# Patient Record
Sex: FEMALE | Race: WHITE | NOT HISPANIC OR LATINO
[De-identification: names, ages, dates, MRNs, and addresses within clinical notes are randomized per-mention and may not be internally consistent; named-entity substitution may affect disease eponyms.]

---

## 2020-11-16 PROBLEM — Z00.00 ENCOUNTER FOR PREVENTIVE HEALTH EXAMINATION: Status: ACTIVE | Noted: 2020-11-16

## 2020-11-17 ENCOUNTER — APPOINTMENT (OUTPATIENT)
Dept: OBGYN | Facility: CLINIC | Age: 25
End: 2020-11-17
Payer: COMMERCIAL

## 2020-11-17 ENCOUNTER — NON-APPOINTMENT (OUTPATIENT)
Age: 25
End: 2020-11-17

## 2020-11-17 VITALS
WEIGHT: 182 LBS | BODY MASS INDEX: 26.96 KG/M2 | HEIGHT: 69 IN | SYSTOLIC BLOOD PRESSURE: 130 MMHG | DIASTOLIC BLOOD PRESSURE: 84 MMHG

## 2020-11-17 PROCEDURE — 76830 TRANSVAGINAL US NON-OB: CPT

## 2020-11-17 PROCEDURE — 99203 OFFICE O/P NEW LOW 30 MIN: CPT | Mod: 25

## 2020-11-17 NOTE — PROCEDURE
[Amenorrhea] : Amenorrhea [Transvaginal Ultrasound] : transvaginal ultrasound [Anteverted] : anteverted [No Fibroid(s)] : no fibroid(s) [L: ___ cm] : L: [unfilled] cm [W: ___cm] : W: [unfilled] cm [FreeTextEntry7] : 3.31 [FreeTextEntry8] : 1.98 [FreeTextEntry6] : + fhr crl 0.77  [FreeTextEntry4] : [facility] 7/8/21

## 2020-11-19 LAB
C TRACH RRNA SPEC QL NAA+PROBE: NOT DETECTED
N GONORRHOEA RRNA SPEC QL NAA+PROBE: NOT DETECTED
SOURCE AMPLIFICATION: NORMAL

## 2020-12-09 LAB
ABO + RH PNL BLD: NORMAL
BASOPHILS # BLD AUTO: 0.04 K/UL
BASOPHILS NFR BLD AUTO: 0.5 %
BLD GP AB SCN SERPL QL: NORMAL
EOSINOPHIL # BLD AUTO: 0.11 K/UL
EOSINOPHIL NFR BLD AUTO: 1.3 %
HBV SURFACE AG SERPL QL IA: NONREACTIVE
HCT VFR BLD CALC: 46 %
HGB BLD-MCNC: 14.9 G/DL
HIV1+2 AB SPEC QL IA.RAPID: NONREACTIVE
IMM GRANULOCYTES NFR BLD AUTO: 0.4 %
LYMPHOCYTES # BLD AUTO: 2.22 K/UL
LYMPHOCYTES NFR BLD AUTO: 26.7 %
MAN DIFF?: NORMAL
MCHC RBC-ENTMCNC: 27 PG
MCHC RBC-ENTMCNC: 32.4 G/DL
MCV RBC AUTO: 83.3 FL
MEV IGG FLD QL IA: >300 AU/ML
MEV IGG+IGM SER-IMP: POSITIVE
MONOCYTES # BLD AUTO: 0.74 K/UL
MONOCYTES NFR BLD AUTO: 8.9 %
NEUTROPHILS # BLD AUTO: 5.18 K/UL
NEUTROPHILS NFR BLD AUTO: 62.2 %
PLATELET # BLD AUTO: 220 K/UL
RBC # BLD: 5.52 M/UL
RBC # FLD: 13.3 %
RUBV IGG FLD-ACNC: 9.1 INDEX
RUBV IGG SER-IMP: POSITIVE
SARS-COV-2 IGG SERPL IA-ACNC: 0.08 INDEX
SARS-COV-2 IGG SERPL QL IA: NEGATIVE
T PALLIDUM AB SER QL IA: NEGATIVE
WBC # FLD AUTO: 8.32 K/UL

## 2020-12-15 ENCOUNTER — NON-APPOINTMENT (OUTPATIENT)
Age: 25
End: 2020-12-15

## 2020-12-15 ENCOUNTER — APPOINTMENT (OUTPATIENT)
Dept: OBGYN | Facility: CLINIC | Age: 25
End: 2020-12-15
Payer: COMMERCIAL

## 2020-12-15 VITALS — HEIGHT: 69 IN | WEIGHT: 183 LBS | BODY MASS INDEX: 27.11 KG/M2

## 2020-12-15 VITALS — DIASTOLIC BLOOD PRESSURE: 99 MMHG | SYSTOLIC BLOOD PRESSURE: 138 MMHG

## 2020-12-15 LAB
BILIRUB UR QL STRIP: NORMAL
CLARITY UR: CLEAR
COLLECTION METHOD: NORMAL
GLUCOSE UR-MCNC: NORMAL
HCG UR QL: 0.2 EU/DL
HGB UR QL STRIP.AUTO: NORMAL
KETONES UR-MCNC: NORMAL
LEUKOCYTE ESTERASE UR QL STRIP: NORMAL
NITRITE UR QL STRIP: NORMAL
PH UR STRIP: 6
PROT UR STRIP-MCNC: NORMAL
SP GR UR STRIP: 1.02

## 2020-12-15 PROCEDURE — 0501F PRENATAL FLOW SHEET: CPT

## 2020-12-28 ENCOUNTER — APPOINTMENT (OUTPATIENT)
Dept: MATERNAL FETAL MEDICINE | Facility: CLINIC | Age: 25
End: 2020-12-28
Payer: COMMERCIAL

## 2020-12-28 PROCEDURE — 76801 OB US < 14 WKS SINGLE FETUS: CPT

## 2020-12-28 PROCEDURE — 99215 OFFICE O/P EST HI 40 MIN: CPT | Mod: 25

## 2020-12-28 PROCEDURE — 76813 OB US NUCHAL MEAS 1 GEST: CPT

## 2020-12-28 PROCEDURE — 99072 ADDL SUPL MATRL&STAF TM PHE: CPT

## 2021-01-11 ENCOUNTER — NON-APPOINTMENT (OUTPATIENT)
Age: 26
End: 2021-01-11

## 2021-01-11 ENCOUNTER — APPOINTMENT (OUTPATIENT)
Dept: OBGYN | Facility: CLINIC | Age: 26
End: 2021-01-11
Payer: COMMERCIAL

## 2021-01-11 ENCOUNTER — TRANSCRIPTION ENCOUNTER (OUTPATIENT)
Age: 26
End: 2021-01-11

## 2021-01-11 VITALS — BODY MASS INDEX: 26.88 KG/M2 | SYSTOLIC BLOOD PRESSURE: 131 MMHG | DIASTOLIC BLOOD PRESSURE: 90 MMHG | WEIGHT: 182 LBS

## 2021-01-11 PROCEDURE — 0502F SUBSEQUENT PRENATAL CARE: CPT

## 2021-01-12 LAB
BILIRUB UR QL STRIP: NORMAL
GLUCOSE UR-MCNC: NORMAL
HCG UR QL: 1 EU/DL
HGB UR QL STRIP.AUTO: NORMAL
KETONES UR-MCNC: NORMAL
LEUKOCYTE ESTERASE UR QL STRIP: NORMAL
NITRITE UR QL STRIP: NORMAL
PH UR STRIP: 7
PROT UR STRIP-MCNC: NORMAL
SP GR UR STRIP: 1.02

## 2021-01-21 ENCOUNTER — NON-APPOINTMENT (OUTPATIENT)
Age: 26
End: 2021-01-21

## 2021-02-09 ENCOUNTER — NON-APPOINTMENT (OUTPATIENT)
Age: 26
End: 2021-02-09

## 2021-02-16 ENCOUNTER — APPOINTMENT (OUTPATIENT)
Dept: MATERNAL FETAL MEDICINE | Facility: CLINIC | Age: 26
End: 2021-02-16

## 2021-02-16 ENCOUNTER — NON-APPOINTMENT (OUTPATIENT)
Age: 26
End: 2021-02-16

## 2021-02-16 ENCOUNTER — APPOINTMENT (OUTPATIENT)
Dept: MATERNAL FETAL MEDICINE | Facility: CLINIC | Age: 26
End: 2021-02-16
Payer: COMMERCIAL

## 2021-02-16 ENCOUNTER — APPOINTMENT (OUTPATIENT)
Dept: OBGYN | Facility: CLINIC | Age: 26
End: 2021-02-16
Payer: COMMERCIAL

## 2021-02-16 VITALS — BODY MASS INDEX: 27.62 KG/M2 | DIASTOLIC BLOOD PRESSURE: 92 MMHG | WEIGHT: 187 LBS | SYSTOLIC BLOOD PRESSURE: 134 MMHG

## 2021-02-16 LAB
BILIRUB UR QL STRIP: NORMAL
GLUCOSE UR-MCNC: NORMAL
HCG UR QL: 1 EU/DL
HGB UR QL STRIP.AUTO: NORMAL
KETONES UR-MCNC: NORMAL
LEUKOCYTE ESTERASE UR QL STRIP: NORMAL
NITRITE UR QL STRIP: NORMAL
PH UR STRIP: 7.5
PROT UR STRIP-MCNC: NORMAL
SP GR UR STRIP: 1.02

## 2021-02-16 PROCEDURE — 99072 ADDL SUPL MATRL&STAF TM PHE: CPT

## 2021-02-16 PROCEDURE — 76817 TRANSVAGINAL US OBSTETRIC: CPT | Mod: 59

## 2021-02-16 PROCEDURE — 76811 OB US DETAILED SNGL FETUS: CPT

## 2021-02-16 PROCEDURE — 0502F SUBSEQUENT PRENATAL CARE: CPT

## 2021-03-15 ENCOUNTER — APPOINTMENT (OUTPATIENT)
Dept: MATERNAL FETAL MEDICINE | Facility: CLINIC | Age: 26
End: 2021-03-15
Payer: COMMERCIAL

## 2021-03-15 ENCOUNTER — APPOINTMENT (OUTPATIENT)
Dept: OBGYN | Facility: CLINIC | Age: 26
End: 2021-03-15
Payer: COMMERCIAL

## 2021-03-15 ENCOUNTER — NON-APPOINTMENT (OUTPATIENT)
Age: 26
End: 2021-03-15

## 2021-03-15 VITALS — BODY MASS INDEX: 28.35 KG/M2 | WEIGHT: 192 LBS | DIASTOLIC BLOOD PRESSURE: 82 MMHG | SYSTOLIC BLOOD PRESSURE: 129 MMHG

## 2021-03-15 DIAGNOSIS — Z3A.10 10 WEEKS GESTATION OF PREGNANCY: ICD-10-CM

## 2021-03-15 DIAGNOSIS — Z3A.19 19 WEEKS GESTATION OF PREGNANCY: ICD-10-CM

## 2021-03-15 PROCEDURE — 76816 OB US FOLLOW-UP PER FETUS: CPT

## 2021-03-15 PROCEDURE — 0502F SUBSEQUENT PRENATAL CARE: CPT

## 2021-03-15 PROCEDURE — 99072 ADDL SUPL MATRL&STAF TM PHE: CPT

## 2021-03-23 LAB
BASOPHILS # BLD AUTO: 0.05 K/UL
BASOPHILS NFR BLD AUTO: 0.5 %
EOSINOPHIL # BLD AUTO: 0.08 K/UL
EOSINOPHIL NFR BLD AUTO: 0.9 %
HCT VFR BLD CALC: 38.9 %
HGB BLD-MCNC: 12.5 G/DL
IMM GRANULOCYTES NFR BLD AUTO: 1.1 %
LYMPHOCYTES # BLD AUTO: 1.71 K/UL
LYMPHOCYTES NFR BLD AUTO: 18.6 %
MAN DIFF?: NORMAL
MCHC RBC-ENTMCNC: 27.6 PG
MCHC RBC-ENTMCNC: 32.1 G/DL
MCV RBC AUTO: 85.9 FL
MONOCYTES # BLD AUTO: 0.59 K/UL
MONOCYTES NFR BLD AUTO: 6.4 %
NEUTROPHILS # BLD AUTO: 6.67 K/UL
NEUTROPHILS NFR BLD AUTO: 72.5 %
PLATELET # BLD AUTO: 174 K/UL
RBC # BLD: 4.53 M/UL
RBC # FLD: 13.9 %
WBC # FLD AUTO: 9.2 K/UL

## 2021-03-24 LAB — GLUCOSE 1H P 50 G GLC PO SERPL-MCNC: 146 MG/DL

## 2021-03-30 ENCOUNTER — APPOINTMENT (OUTPATIENT)
Dept: MATERNAL FETAL MEDICINE | Facility: CLINIC | Age: 26
End: 2021-03-30
Payer: COMMERCIAL

## 2021-03-30 ENCOUNTER — NON-APPOINTMENT (OUTPATIENT)
Age: 26
End: 2021-03-30

## 2021-03-30 LAB
GLUCOSE 1H P 100 G GLC PO SERPL-MCNC: 192 MG/DL
GLUCOSE 2H P CHAL SERPL-MCNC: 167 MG/DL
GLUCOSE 3H P CHAL SERPL-MCNC: 95 MG/DL
GLUCOSE BS SERPL-MCNC: 80 MG/DL

## 2021-03-30 PROCEDURE — 99072 ADDL SUPL MATRL&STAF TM PHE: CPT

## 2021-03-30 PROCEDURE — 76816 OB US FOLLOW-UP PER FETUS: CPT

## 2021-04-05 ENCOUNTER — APPOINTMENT (OUTPATIENT)
Dept: MATERNAL FETAL MEDICINE | Facility: CLINIC | Age: 26
End: 2021-04-05
Payer: COMMERCIAL

## 2021-04-05 PROCEDURE — 99072 ADDL SUPL MATRL&STAF TM PHE: CPT

## 2021-04-05 PROCEDURE — 76815 OB US LIMITED FETUS(S): CPT

## 2021-04-05 PROCEDURE — 76819 FETAL BIOPHYS PROFIL W/O NST: CPT

## 2021-04-05 PROCEDURE — 99212 OFFICE O/P EST SF 10 MIN: CPT | Mod: 25

## 2021-04-07 ENCOUNTER — NON-APPOINTMENT (OUTPATIENT)
Age: 26
End: 2021-04-07

## 2021-04-13 ENCOUNTER — APPOINTMENT (OUTPATIENT)
Dept: OBGYN | Facility: CLINIC | Age: 26
End: 2021-04-13
Payer: COMMERCIAL

## 2021-04-13 ENCOUNTER — NON-APPOINTMENT (OUTPATIENT)
Age: 26
End: 2021-04-13

## 2021-04-13 VITALS — BODY MASS INDEX: 28.21 KG/M2 | DIASTOLIC BLOOD PRESSURE: 88 MMHG | WEIGHT: 191 LBS | SYSTOLIC BLOOD PRESSURE: 130 MMHG

## 2021-04-13 LAB
BILIRUB UR QL STRIP: NORMAL
GLUCOSE UR-MCNC: NORMAL
HCG UR QL: 0.2 EU/DL
HGB UR QL STRIP.AUTO: NORMAL
KETONES UR-MCNC: NORMAL
LEUKOCYTE ESTERASE UR QL STRIP: NORMAL
NITRITE UR QL STRIP: NORMAL
PH UR STRIP: 7
PROT UR STRIP-MCNC: NORMAL
SP GR UR STRIP: 1.02

## 2021-04-13 PROCEDURE — 0502F SUBSEQUENT PRENATAL CARE: CPT

## 2021-04-28 ENCOUNTER — NON-APPOINTMENT (OUTPATIENT)
Age: 26
End: 2021-04-28

## 2021-04-28 ENCOUNTER — APPOINTMENT (OUTPATIENT)
Dept: OBGYN | Facility: CLINIC | Age: 26
End: 2021-04-28
Payer: COMMERCIAL

## 2021-04-28 VITALS — WEIGHT: 189 LBS | DIASTOLIC BLOOD PRESSURE: 85 MMHG | BODY MASS INDEX: 27.91 KG/M2 | SYSTOLIC BLOOD PRESSURE: 136 MMHG

## 2021-04-28 DIAGNOSIS — Z86.32 PERSONAL HISTORY OF GESTATIONAL DIABETES: ICD-10-CM

## 2021-04-28 DIAGNOSIS — Z32.00 ENCOUNTER FOR PREGNANCY TEST, RESULT UNKNOWN: ICD-10-CM

## 2021-04-28 DIAGNOSIS — Z11.3 ENCOUNTER FOR SCREENING FOR INFECTIONS WITH A PREDOMINANTLY SEXUAL MODE OF TRANSMISSION: ICD-10-CM

## 2021-04-28 PROCEDURE — 0502F SUBSEQUENT PRENATAL CARE: CPT

## 2021-04-29 LAB
BILIRUB UR QL STRIP: NORMAL
GLUCOSE UR-MCNC: NORMAL
HCG UR QL: 0.2 EU/DL
HGB UR QL STRIP.AUTO: NORMAL
KETONES UR-MCNC: NORMAL
LEUKOCYTE ESTERASE UR QL STRIP: NORMAL
NITRITE UR QL STRIP: NORMAL
PH UR STRIP: 7
PROT UR STRIP-MCNC: NORMAL
SP GR UR STRIP: 1

## 2021-05-03 ENCOUNTER — APPOINTMENT (OUTPATIENT)
Dept: MATERNAL FETAL MEDICINE | Facility: CLINIC | Age: 26
End: 2021-05-03

## 2021-05-03 ENCOUNTER — APPOINTMENT (OUTPATIENT)
Dept: ANTEPARTUM | Facility: CLINIC | Age: 26
End: 2021-05-03

## 2021-05-10 ENCOUNTER — APPOINTMENT (OUTPATIENT)
Dept: OBGYN | Facility: CLINIC | Age: 26
End: 2021-05-10
Payer: COMMERCIAL

## 2021-05-10 ENCOUNTER — NON-APPOINTMENT (OUTPATIENT)
Age: 26
End: 2021-05-10

## 2021-05-10 VITALS — DIASTOLIC BLOOD PRESSURE: 82 MMHG | WEIGHT: 191 LBS | SYSTOLIC BLOOD PRESSURE: 120 MMHG | BODY MASS INDEX: 28.21 KG/M2

## 2021-05-10 PROCEDURE — 0502F SUBSEQUENT PRENATAL CARE: CPT

## 2021-05-19 LAB
BASOPHILS # BLD AUTO: 0.05 K/UL
BASOPHILS NFR BLD AUTO: 0.5 %
EOSINOPHIL # BLD AUTO: 0.09 K/UL
EOSINOPHIL NFR BLD AUTO: 0.9 %
HCT VFR BLD CALC: 38 %
HGB BLD-MCNC: 12.3 G/DL
IMM GRANULOCYTES NFR BLD AUTO: 0.9 %
LYMPHOCYTES # BLD AUTO: 2.35 K/UL
LYMPHOCYTES NFR BLD AUTO: 23.5 %
MAN DIFF?: NORMAL
MCHC RBC-ENTMCNC: 27.7 PG
MCHC RBC-ENTMCNC: 32.4 G/DL
MCV RBC AUTO: 85.6 FL
MONOCYTES # BLD AUTO: 1.08 K/UL
MONOCYTES NFR BLD AUTO: 10.8 %
NEUTROPHILS # BLD AUTO: 6.36 K/UL
NEUTROPHILS NFR BLD AUTO: 63.4 %
PLATELET # BLD AUTO: 157 K/UL
RBC # BLD: 4.44 M/UL
RBC # FLD: 13.5 %
WBC # FLD AUTO: 10.02 K/UL

## 2021-05-24 ENCOUNTER — APPOINTMENT (OUTPATIENT)
Dept: OBGYN | Facility: CLINIC | Age: 26
End: 2021-05-24
Payer: COMMERCIAL

## 2021-05-24 ENCOUNTER — NON-APPOINTMENT (OUTPATIENT)
Age: 26
End: 2021-05-24

## 2021-05-24 VITALS — DIASTOLIC BLOOD PRESSURE: 82 MMHG | SYSTOLIC BLOOD PRESSURE: 118 MMHG | WEIGHT: 190 LBS | BODY MASS INDEX: 28.06 KG/M2

## 2021-05-24 PROCEDURE — 0502F SUBSEQUENT PRENATAL CARE: CPT

## 2021-05-25 LAB
ALBUMIN SERPL ELPH-MCNC: 4 G/DL
ALP BLD-CCNC: 97 U/L
ALT SERPL-CCNC: 22 U/L
ANION GAP SERPL CALC-SCNC: 10 MMOL/L
AST SERPL-CCNC: 15 U/L
BILIRUB SERPL-MCNC: 0.4 MG/DL
BILIRUB UR QL STRIP: NORMAL
BUN SERPL-MCNC: 5 MG/DL
CALCIUM SERPL-MCNC: 9.2 MG/DL
CHLORIDE SERPL-SCNC: 105 MMOL/L
CO2 SERPL-SCNC: 25 MMOL/L
CREAT SERPL-MCNC: 0.5 MG/DL
CREAT SPEC-SCNC: 23 MG/DL
CREAT/PROT UR: <0.2 RATIO
GLUCOSE SERPL-MCNC: 70 MG/DL
GLUCOSE UR-MCNC: NORMAL
HCG UR QL: 0.2 EU/DL
HGB UR QL STRIP.AUTO: NORMAL
HIV1+2 AB SPEC QL IA.RAPID: NONREACTIVE
KETONES UR-MCNC: NORMAL
LEUKOCYTE ESTERASE UR QL STRIP: NORMAL
NITRITE UR QL STRIP: NORMAL
PH UR STRIP: 7
POTASSIUM SERPL-SCNC: 3.9 MMOL/L
PROT SERPL-MCNC: 6.5 G/DL
PROT UR STRIP-MCNC: NORMAL
PROT UR-MCNC: <5 MG/DLG/24H
SODIUM SERPL-SCNC: 140 MMOL/L
SP GR UR STRIP: 1.01
URATE SERPL-MCNC: 3.2 MG/DL

## 2021-05-26 ENCOUNTER — NON-APPOINTMENT (OUTPATIENT)
Age: 26
End: 2021-05-26

## 2021-06-07 ENCOUNTER — APPOINTMENT (OUTPATIENT)
Dept: OBGYN | Facility: CLINIC | Age: 26
End: 2021-06-07
Payer: COMMERCIAL

## 2021-06-07 ENCOUNTER — NON-APPOINTMENT (OUTPATIENT)
Age: 26
End: 2021-06-07

## 2021-06-07 VITALS — SYSTOLIC BLOOD PRESSURE: 116 MMHG | WEIGHT: 195 LBS | DIASTOLIC BLOOD PRESSURE: 82 MMHG | BODY MASS INDEX: 28.8 KG/M2

## 2021-06-07 PROCEDURE — 0502F SUBSEQUENT PRENATAL CARE: CPT

## 2021-06-14 ENCOUNTER — NON-APPOINTMENT (OUTPATIENT)
Age: 26
End: 2021-06-14

## 2021-06-14 ENCOUNTER — APPOINTMENT (OUTPATIENT)
Dept: OBGYN | Facility: CLINIC | Age: 26
End: 2021-06-14
Payer: COMMERCIAL

## 2021-06-14 VITALS — WEIGHT: 192 LBS | BODY MASS INDEX: 28.35 KG/M2 | SYSTOLIC BLOOD PRESSURE: 115 MMHG | DIASTOLIC BLOOD PRESSURE: 85 MMHG

## 2021-06-14 PROCEDURE — 0502F SUBSEQUENT PRENATAL CARE: CPT

## 2021-06-17 ENCOUNTER — APPOINTMENT (OUTPATIENT)
Dept: ANTEPARTUM | Facility: CLINIC | Age: 26
End: 2021-06-17
Payer: COMMERCIAL

## 2021-06-17 ENCOUNTER — ASOB RESULT (OUTPATIENT)
Age: 26
End: 2021-06-17

## 2021-06-17 VITALS
HEART RATE: 76 BPM | HEIGHT: 69 IN | SYSTOLIC BLOOD PRESSURE: 114 MMHG | BODY MASS INDEX: 28.88 KG/M2 | WEIGHT: 195 LBS | DIASTOLIC BLOOD PRESSURE: 76 MMHG | TEMPERATURE: 98 F

## 2021-06-17 PROCEDURE — 76816 OB US FOLLOW-UP PER FETUS: CPT | Mod: 26

## 2021-06-17 PROCEDURE — 76819 FETAL BIOPHYS PROFIL W/O NST: CPT | Mod: 26

## 2021-06-17 PROCEDURE — 99072 ADDL SUPL MATRL&STAF TM PHE: CPT

## 2021-06-21 ENCOUNTER — APPOINTMENT (OUTPATIENT)
Dept: OBGYN | Facility: CLINIC | Age: 26
End: 2021-06-21
Payer: COMMERCIAL

## 2021-06-21 ENCOUNTER — NON-APPOINTMENT (OUTPATIENT)
Age: 26
End: 2021-06-21

## 2021-06-21 ENCOUNTER — OUTPATIENT (OUTPATIENT)
Dept: OUTPATIENT SERVICES | Facility: HOSPITAL | Age: 26
LOS: 1 days | Discharge: HOME | End: 2021-06-21

## 2021-06-21 VITALS — SYSTOLIC BLOOD PRESSURE: 96 MMHG | DIASTOLIC BLOOD PRESSURE: 55 MMHG | HEART RATE: 83 BPM

## 2021-06-21 VITALS
DIASTOLIC BLOOD PRESSURE: 92 MMHG | TEMPERATURE: 98 F | SYSTOLIC BLOOD PRESSURE: 156 MMHG | HEART RATE: 82 BPM | RESPIRATION RATE: 20 BRPM

## 2021-06-21 VITALS — BODY MASS INDEX: 28.21 KG/M2 | DIASTOLIC BLOOD PRESSURE: 94 MMHG | WEIGHT: 191 LBS | SYSTOLIC BLOOD PRESSURE: 128 MMHG

## 2021-06-21 DIAGNOSIS — K40.20 BILATERAL INGUINAL HERNIA, WITHOUT OBSTRUCTION OR GANGRENE, NOT SPECIFIED AS RECURRENT: Chronic | ICD-10-CM

## 2021-06-21 LAB
ALBUMIN SERPL ELPH-MCNC: 4.1 G/DL — SIGNIFICANT CHANGE UP (ref 3.5–5.2)
ALP SERPL-CCNC: 165 U/L — HIGH (ref 30–115)
ALT FLD-CCNC: 21 U/L — SIGNIFICANT CHANGE UP (ref 0–41)
ANION GAP SERPL CALC-SCNC: 11 MMOL/L — SIGNIFICANT CHANGE UP (ref 7–14)
APPEARANCE UR: ABNORMAL
AST SERPL-CCNC: 15 U/L — SIGNIFICANT CHANGE UP (ref 0–41)
BACTERIA # UR AUTO: ABNORMAL
BILIRUB SERPL-MCNC: 0.5 MG/DL — SIGNIFICANT CHANGE UP (ref 0.2–1.2)
BILIRUB UR QL STRIP: NORMAL
BILIRUB UR-MCNC: NEGATIVE — SIGNIFICANT CHANGE UP
BUN SERPL-MCNC: 6 MG/DL — LOW (ref 10–20)
CALCIUM SERPL-MCNC: 10 MG/DL — SIGNIFICANT CHANGE UP (ref 8.5–10.1)
CHLORIDE SERPL-SCNC: 101 MMOL/L — SIGNIFICANT CHANGE UP (ref 98–110)
CO2 SERPL-SCNC: 24 MMOL/L — SIGNIFICANT CHANGE UP (ref 17–32)
COLOR SPEC: SIGNIFICANT CHANGE UP
CREAT ?TM UR-MCNC: 18 MG/DL — SIGNIFICANT CHANGE UP
CREAT SERPL-MCNC: 0.6 MG/DL — LOW (ref 0.7–1.5)
DIFF PNL FLD: NEGATIVE — SIGNIFICANT CHANGE UP
EPI CELLS # UR: 3 /HPF — SIGNIFICANT CHANGE UP (ref 0–5)
GLUCOSE BLDC GLUCOMTR-MCNC: 81 MG/DL — SIGNIFICANT CHANGE UP (ref 70–99)
GLUCOSE SERPL-MCNC: 78 MG/DL — SIGNIFICANT CHANGE UP (ref 70–99)
GLUCOSE UR QL: NEGATIVE — SIGNIFICANT CHANGE UP
GLUCOSE UR-MCNC: NORMAL
GP B STREP DNA SPEC QL NAA+PROBE: NORMAL
GP B STREP DNA SPEC QL NAA+PROBE: NOT DETECTED
HCG UR QL: 1 EU/DL
HCT VFR BLD CALC: 40 % — SIGNIFICANT CHANGE UP (ref 37–47)
HGB BLD-MCNC: 13.2 G/DL — SIGNIFICANT CHANGE UP (ref 12–16)
HGB UR QL STRIP.AUTO: NORMAL
HYALINE CASTS # UR AUTO: 1 /LPF — SIGNIFICANT CHANGE UP (ref 0–7)
KETONES UR-MCNC: NEGATIVE — SIGNIFICANT CHANGE UP
KETONES UR-MCNC: NORMAL
LDH SERPL L TO P-CCNC: 154 — SIGNIFICANT CHANGE UP (ref 50–242)
LEUKOCYTE ESTERASE UR QL STRIP: NORMAL
LEUKOCYTE ESTERASE UR-ACNC: NEGATIVE — SIGNIFICANT CHANGE UP
MCHC RBC-ENTMCNC: 27.2 PG — SIGNIFICANT CHANGE UP (ref 27–31)
MCHC RBC-ENTMCNC: 33 G/DL — SIGNIFICANT CHANGE UP (ref 32–37)
MCV RBC AUTO: 82.3 FL — SIGNIFICANT CHANGE UP (ref 81–99)
NITRITE UR QL STRIP: NORMAL
NITRITE UR-MCNC: NEGATIVE — SIGNIFICANT CHANGE UP
NRBC # BLD: 0 /100 WBCS — SIGNIFICANT CHANGE UP (ref 0–0)
PH UR STRIP: 7.5
PH UR: 7 — SIGNIFICANT CHANGE UP (ref 5–8)
PLATELET # BLD AUTO: 151 K/UL — SIGNIFICANT CHANGE UP (ref 130–400)
POTASSIUM SERPL-MCNC: 3.9 MMOL/L — SIGNIFICANT CHANGE UP (ref 3.5–5)
POTASSIUM SERPL-SCNC: 3.9 MMOL/L — SIGNIFICANT CHANGE UP (ref 3.5–5)
PROT ?TM UR-MCNC: <5 MG/DLG/24H — SIGNIFICANT CHANGE UP
PROT SERPL-MCNC: 7.2 G/DL — SIGNIFICANT CHANGE UP (ref 6–8)
PROT UR STRIP-MCNC: NORMAL
PROT UR-MCNC: NEGATIVE — SIGNIFICANT CHANGE UP
PROT/CREAT UR-RTO: <0.3 RATIO — HIGH (ref 0–0.2)
RBC # BLD: 4.86 M/UL — SIGNIFICANT CHANGE UP (ref 4.2–5.4)
RBC # FLD: 13.4 % — SIGNIFICANT CHANGE UP (ref 11.5–14.5)
RBC CASTS # UR COMP ASSIST: 2 /HPF — SIGNIFICANT CHANGE UP (ref 0–4)
SODIUM SERPL-SCNC: 136 MMOL/L — SIGNIFICANT CHANGE UP (ref 135–146)
SOURCE GBS: NORMAL
SP GR SPEC: 1 — LOW (ref 1.01–1.03)
SP GR UR STRIP: 1.02
URATE SERPL-MCNC: 4.1 MG/DL — SIGNIFICANT CHANGE UP (ref 2.5–7)
UROBILINOGEN FLD QL: SIGNIFICANT CHANGE UP
WBC # BLD: 9.54 K/UL — SIGNIFICANT CHANGE UP (ref 4.8–10.8)
WBC # FLD AUTO: 9.54 K/UL — SIGNIFICANT CHANGE UP (ref 4.8–10.8)
WBC UR QL: 13 /HPF — HIGH (ref 0–5)

## 2021-06-21 PROCEDURE — 0502F SUBSEQUENT PRENATAL CARE: CPT

## 2021-06-21 RX ORDER — SODIUM CHLORIDE 9 MG/ML
300 INJECTION, SOLUTION INTRAVENOUS
Refills: 0 | Status: DISCONTINUED | OUTPATIENT
Start: 2021-06-21 | End: 2021-07-06

## 2021-06-21 NOTE — OB PROVIDER TRIAGE NOTE - HISTORY OF PRESENT ILLNESS
27 y/o  at 37w4d, KOSTA 21, dated by LMP c/w first trimester sonogram, was sent over from PMD's office for blood pressure monitoring. Blood pressure in the office was 128/94 on repeat it was 130/108. Patient has chronic hypertension. Denies headaches, vision changes, SOB, chest pain, RUQ/epigastrin pain or abnormal swelling. Pregnancy has also been complicated with GMA1, fasting FS: 70-80's, postprandial <120's, and polyhydramnios, MVP in office today was 10.5cm. Denies other complications, LOF, vaginal bleeding, contractions or dysuria. Reports good fetal movement. GBS negative

## 2021-06-21 NOTE — OB PROVIDER TRIAGE NOTE - NSHPLABSRESULTS_GEN_ALL_CORE
Type and Screen: O neg  Antibody screen: neg   Rubella: immune ??  RPR: neg  HbSAg: neg  HIV: NR    Second Trimester:    GTT 80/192(H)/167(H)/95    rhogam: 4/21    Third Trimester:  HIV: NR  GBS: neg    10.2>12.3/38<157, 140/3.9/105/25/5/0.5<70, AST/ALT 15/22, Uprcr: <0.2, uric acid: 3.2    Sonograms:  6/17: 37w0d, 3301g (76%), MVP 7.05cm, ant placenta, vertex  12/24: 12w4d, NT 1.0mm  2/16: 19w5d, transverse, ant placenta, no previa, possible left foot clubbing otherwise normal fetal anatomical survey  3/15: 23w4d, 618g (47%), transverse, ant placenta, no previa, left foot   3/30: 25w5d, 896g (55%), cephalic, ant placenta, MVP 5cm  4/5: 26w4d, MVP 4.2cm, cephalic, ant placenta, bpp 8/8 Type and Screen: O neg  Antibody screen: neg   Rubella: immune ??  RPR: neg  HbSAg: neg  HIV: NR    Second Trimester:    GTT 80/192(H)/167(H)/95    rhogam: 4/21    Third Trimester:  HIV: NR  GBS: neg    10.2>12.3/38<157, 140/3.9/105/25/5/0.5<70, AST/ALT 15/22, Uprcr: <0.2, uric acid: 3.2    Sonograms:  6/17: 37w0d, 3301g (76%), MVP 7.05cm, ant placenta, vertex  12/24: 12w4d, NT 1.0mm  2/16: 19w5d, transverse, ant placenta, no previa, possible left foot clubbing otherwise normal fetal anatomical survey  3/15: 23w4d, 618g (47%), transverse, ant placenta, no previa, left foot   3/30: 25w5d, 896g (55%), cephalic, ant placenta, MVP 5cm  4/5: 26w4d, MVP 4.2cm, cephalic, ant placenta, bpp 8/8                          13.2   9.54  )-----------( 151      ( 21 Jun 2021 14:29 )             40.0   06-21    136  |  101  |  6<L>  ----------------------------<  78  3.9   |  24  |  0.6<L>    Ca    10.0      21 Jun 2021 14:29    TPro  7.2  /  Alb  4.1  /  TBili  0.5  /  DBili  x   /  AST  15  /  ALT  21  /  AlkPhos  165<H>  06-21    Uric Acid, Serum (06.21.21 @ 14:29)    Uric Acid, Serum: 4.1 mg/dL    Lactate Dehydrogenase, Serum (06.21.21 @ 14:29)    Lactate Dehydrogenase, Serum: 154    Urinalysis (06.21.21 @ 14:29)    pH Urine: 7.0    Glucose Qualitative, Urine: Negative    Blood, Urine: Negative    Color: Light Yellow    Urine Appearance: Slightly Turbid    Bilirubin: Negative    Ketone - Urine: Negative    Specific Gravity: 1.004    Protein, Urine: Negative    Urobilinogen: <2 mg/dL    Nitrite: Negative    Leukocyte Esterase Concentration: Negative

## 2021-06-21 NOTE — OB PROVIDER TRIAGE NOTE - NSHPPHYSICALEXAM_GEN_ALL_CORE
Vital Signs Last 24 Hrs  T(C): 36.7 (21 Jun 2021 13:49), Max: 36.7 (21 Jun 2021 13:49)  T(F): 98 (21 Jun 2021 13:49), Max: 98 (21 Jun 2021 13:49)  HR: 75 (21 Jun 2021 14:30) (75 - 82)  BP: 126/87 (21 Jun 2021 14:30) (126/87 - 156/92)  RR: 20 (21 Jun 2021 13:49) (20 - 20)    GA: AAOx3 in NAD  CV: normal s1s2  Pulm: CTAB   neuro: 2+ DTR bilaterally  abd: gravid, no palpable contractions, nontender, no RUQ/epigastric tenderness  EFM: 140/mod variability/accels +  toco: irregular  SVE; deferred  BSS: vertex, ant placenta, MVP 8.5cm, BPP 8/8

## 2021-06-21 NOTE — OB PROVIDER TRIAGE NOTE - NSOBPROVIDERNOTE_OBGYN_ALL_OB_FT
27 y/o  at 37w4d, GBS negative, with chronic hypertension, r/u preeclampsia, BPP 10/10, with reassuring maternal and fetal status  - for blood pressure monitoring, [id number]  - f/u PEL  - IV insert  - cont efm/toco  - reevaluate    Dr. Aquino and Dr Perez aware 25 y/o  at 37w4d, GBS negative, with chronic hypertension, r/u preeclampsia, BPP 10/10, with reassuring maternal and fetal status  - for blood pressure monitoring, [id number]  - f/u PEL  - IV insert  - cont efm/toco  - reevaluate    Dr. Aquino and Dr Perez aware    Addendum: Patient seen at bedside, most of her blood pressures have been in the normal range since being in L&D. Plan is for induction of labor on Wednesday at 1900 for multiple comorbidities. Patient is aware and agrees. Plan discussed with MFM, Dr. Ball and agrees with plan above 27 y/o  at 37w4d, GBS negative, GDMA1,  chronic hypertension, r/o preeclampsia, BPP 10/10, with reassuring maternal and fetal status  - for blood pressure monitoring, [id number]  - f/u PEL  - IV insert  - cont efm/toco  - reevaluate    Dr. Aquino and Dr Avila aware    Addendum: Patient seen at bedside, most of her blood pressures have been in the normal range since being in L&D. Plan is for induction of labor on Wednesday at 1900 for multiple comorbidities. Patient is aware and agrees. Plan discussed with M, Dr. Ball and agrees with plan above.    Dr. Aquino and Dr. Avila aware

## 2021-06-22 LAB — SARS-COV-2 RNA SPEC QL NAA+PROBE: SIGNIFICANT CHANGE UP

## 2021-06-23 ENCOUNTER — INPATIENT (INPATIENT)
Facility: HOSPITAL | Age: 26
LOS: 1 days | Discharge: HOME | End: 2021-06-25
Attending: OBSTETRICS & GYNECOLOGY | Admitting: OBSTETRICS & GYNECOLOGY
Payer: COMMERCIAL

## 2021-06-23 VITALS — TEMPERATURE: 98 F

## 2021-06-23 DIAGNOSIS — K40.20 BILATERAL INGUINAL HERNIA, WITHOUT OBSTRUCTION OR GANGRENE, NOT SPECIFIED AS RECURRENT: Chronic | ICD-10-CM

## 2021-06-23 LAB
ALBUMIN SERPL ELPH-MCNC: 4.1 G/DL — SIGNIFICANT CHANGE UP (ref 3.5–5.2)
ALP SERPL-CCNC: 157 U/L — HIGH (ref 30–115)
ALT FLD-CCNC: 17 U/L — SIGNIFICANT CHANGE UP (ref 0–41)
AMPHET UR-MCNC: NEGATIVE — SIGNIFICANT CHANGE UP
ANION GAP SERPL CALC-SCNC: 11 MMOL/L — SIGNIFICANT CHANGE UP (ref 7–14)
APPEARANCE UR: CLEAR — SIGNIFICANT CHANGE UP
AST SERPL-CCNC: 14 U/L — SIGNIFICANT CHANGE UP (ref 0–41)
BACTERIA # UR AUTO: ABNORMAL
BARBITURATES UR SCN-MCNC: NEGATIVE — SIGNIFICANT CHANGE UP
BASOPHILS # BLD AUTO: 0.05 K/UL — SIGNIFICANT CHANGE UP (ref 0–0.2)
BASOPHILS NFR BLD AUTO: 0.5 % — SIGNIFICANT CHANGE UP (ref 0–1)
BENZODIAZ UR-MCNC: NEGATIVE — SIGNIFICANT CHANGE UP
BILIRUB SERPL-MCNC: 0.3 MG/DL — SIGNIFICANT CHANGE UP (ref 0.2–1.2)
BILIRUB UR-MCNC: NEGATIVE — SIGNIFICANT CHANGE UP
BLD GP AB SCN SERPL QL: SIGNIFICANT CHANGE UP
BUN SERPL-MCNC: 8 MG/DL — LOW (ref 10–20)
BUPRENORPHINE SCREEN, URINE RESULT: NEGATIVE — SIGNIFICANT CHANGE UP
CALCIUM SERPL-MCNC: 9.7 MG/DL — SIGNIFICANT CHANGE UP (ref 8.5–10.1)
CHLORIDE SERPL-SCNC: 105 MMOL/L — SIGNIFICANT CHANGE UP (ref 98–110)
CO2 SERPL-SCNC: 21 MMOL/L — SIGNIFICANT CHANGE UP (ref 17–32)
COCAINE METAB.OTHER UR-MCNC: NEGATIVE — SIGNIFICANT CHANGE UP
COLOR SPEC: SIGNIFICANT CHANGE UP
CREAT ?TM UR-MCNC: 25 MG/DL — SIGNIFICANT CHANGE UP
CREAT SERPL-MCNC: 0.6 MG/DL — LOW (ref 0.7–1.5)
DIFF PNL FLD: NEGATIVE — SIGNIFICANT CHANGE UP
EOSINOPHIL # BLD AUTO: 0.05 K/UL — SIGNIFICANT CHANGE UP (ref 0–0.7)
EOSINOPHIL NFR BLD AUTO: 0.5 % — SIGNIFICANT CHANGE UP (ref 0–8)
EPI CELLS # UR: 3 /HPF — SIGNIFICANT CHANGE UP (ref 0–5)
FENTANYL UR QL: NEGATIVE — SIGNIFICANT CHANGE UP
GLUCOSE BLDC GLUCOMTR-MCNC: 77 MG/DL — SIGNIFICANT CHANGE UP (ref 70–99)
GLUCOSE SERPL-MCNC: 78 MG/DL — SIGNIFICANT CHANGE UP (ref 70–99)
GLUCOSE UR QL: NEGATIVE — SIGNIFICANT CHANGE UP
HCT VFR BLD CALC: 38.4 % — SIGNIFICANT CHANGE UP (ref 37–47)
HGB BLD-MCNC: 12.8 G/DL — SIGNIFICANT CHANGE UP (ref 12–16)
HYALINE CASTS # UR AUTO: 3 /LPF — SIGNIFICANT CHANGE UP (ref 0–7)
IMM GRANULOCYTES NFR BLD AUTO: 0.7 % — HIGH (ref 0.1–0.3)
KETONES UR-MCNC: NEGATIVE — SIGNIFICANT CHANGE UP
L&D DRUG SCREEN, URINE: SIGNIFICANT CHANGE UP
LDH SERPL L TO P-CCNC: 168 — SIGNIFICANT CHANGE UP (ref 50–242)
LEUKOCYTE ESTERASE UR-ACNC: ABNORMAL
LYMPHOCYTES # BLD AUTO: 2.15 K/UL — SIGNIFICANT CHANGE UP (ref 1.2–3.4)
LYMPHOCYTES # BLD AUTO: 22.5 % — SIGNIFICANT CHANGE UP (ref 20.5–51.1)
MCHC RBC-ENTMCNC: 27.5 PG — SIGNIFICANT CHANGE UP (ref 27–31)
MCHC RBC-ENTMCNC: 33.3 G/DL — SIGNIFICANT CHANGE UP (ref 32–37)
MCV RBC AUTO: 82.6 FL — SIGNIFICANT CHANGE UP (ref 81–99)
METHADONE UR-MCNC: NEGATIVE — SIGNIFICANT CHANGE UP
MONOCYTES # BLD AUTO: 0.99 K/UL — HIGH (ref 0.1–0.6)
MONOCYTES NFR BLD AUTO: 10.3 % — HIGH (ref 1.7–9.3)
NEUTROPHILS # BLD AUTO: 6.26 K/UL — SIGNIFICANT CHANGE UP (ref 1.4–6.5)
NEUTROPHILS NFR BLD AUTO: 65.5 % — SIGNIFICANT CHANGE UP (ref 42.2–75.2)
NITRITE UR-MCNC: NEGATIVE — SIGNIFICANT CHANGE UP
NRBC # BLD: 0 /100 WBCS — SIGNIFICANT CHANGE UP (ref 0–0)
OPIATES UR-MCNC: NEGATIVE — SIGNIFICANT CHANGE UP
OXYCODONE UR-MCNC: NEGATIVE — SIGNIFICANT CHANGE UP
PCP UR-MCNC: NEGATIVE — SIGNIFICANT CHANGE UP
PH UR: 6.5 — SIGNIFICANT CHANGE UP (ref 5–8)
PLATELET # BLD AUTO: 156 K/UL — SIGNIFICANT CHANGE UP (ref 130–400)
POTASSIUM SERPL-MCNC: 3.9 MMOL/L — SIGNIFICANT CHANGE UP (ref 3.5–5)
POTASSIUM SERPL-SCNC: 3.9 MMOL/L — SIGNIFICANT CHANGE UP (ref 3.5–5)
PRENATAL SYPHILIS TEST: SIGNIFICANT CHANGE UP
PROPOXYPHENE QUALITATIVE URINE RESULT: NEGATIVE — SIGNIFICANT CHANGE UP
PROT ?TM UR-MCNC: 5 MG/DLG/24H — SIGNIFICANT CHANGE UP
PROT SERPL-MCNC: 6.8 G/DL — SIGNIFICANT CHANGE UP (ref 6–8)
PROT UR-MCNC: NEGATIVE — SIGNIFICANT CHANGE UP
PROT/CREAT UR-RTO: 0.2 RATIO — SIGNIFICANT CHANGE UP (ref 0–0.2)
RBC # BLD: 4.65 M/UL — SIGNIFICANT CHANGE UP (ref 4.2–5.4)
RBC # FLD: 13.6 % — SIGNIFICANT CHANGE UP (ref 11.5–14.5)
RBC CASTS # UR COMP ASSIST: 1 /HPF — SIGNIFICANT CHANGE UP (ref 0–4)
SODIUM SERPL-SCNC: 137 MMOL/L — SIGNIFICANT CHANGE UP (ref 135–146)
SP GR SPEC: 1.01 — LOW (ref 1.01–1.03)
URATE SERPL-MCNC: 3.7 MG/DL — SIGNIFICANT CHANGE UP (ref 2.5–7)
UROBILINOGEN FLD QL: SIGNIFICANT CHANGE UP
WBC # BLD: 9.57 K/UL — SIGNIFICANT CHANGE UP (ref 4.8–10.8)
WBC # FLD AUTO: 9.57 K/UL — SIGNIFICANT CHANGE UP (ref 4.8–10.8)
WBC UR QL: 6 /HPF — HIGH (ref 0–5)

## 2021-06-23 RX ORDER — OXYTOCIN 10 UNIT/ML
333.33 VIAL (ML) INJECTION
Qty: 20 | Refills: 0 | Status: DISCONTINUED | OUTPATIENT
Start: 2021-06-23 | End: 2021-06-24

## 2021-06-23 RX ORDER — SODIUM CHLORIDE 9 MG/ML
1000 INJECTION, SOLUTION INTRAVENOUS
Refills: 0 | Status: DISCONTINUED | OUTPATIENT
Start: 2021-06-23 | End: 2021-06-24

## 2021-06-23 RX ORDER — OXYTOCIN 10 UNIT/ML
2 VIAL (ML) INJECTION
Qty: 30 | Refills: 0 | Status: DISCONTINUED | OUTPATIENT
Start: 2021-06-23 | End: 2021-06-24

## 2021-06-23 RX ADMIN — Medication 2 MILLIUNIT(S)/MIN: at 23:51

## 2021-06-23 NOTE — OB PROVIDER H&P - NSHPSOCIALHISTORY_GEN_ALL_CORE
Partially impaired: cannot see medication labels or newsprint, but can see obstacles in path, and the surrounding layout; can count fingers at arm's length/Wears RX Eyeglasses for Distance and Reading Denies tobacco use, alcohol consumption, or recreational/illicit drug use.

## 2021-06-23 NOTE — OB PROVIDER H&P - NSHPPHYSICALEXAM_GEN_ALL_CORE
Vital Signs Last 24 Hrs  T(C): 36.7 (23 Jun 2021 18:59), Max: 36.7 (23 Jun 2021 18:59)  T(F): 98 (23 Jun 2021 18:59), Max: 98 (23 Jun 2021 18:59)  HR: 83 (23 Jun 2021 19:01) (83 - 83)  BP: 130/88 (23 Jun 2021 19:01) (130/88 - 130/88)    Gen: AOx3, NAD  CV:  normal s1, s2, regular rate, rhythm  Pulm: CTAB  Abd: soft, gravid, nontender, no palpable contractions  Ext: no swelling  Neuro: 2+ DTR bilaterally (patellar)  Bedside sono: cephalic     EFM: 135/mod bridger/+accels   [place]: irregular  SVE: 1-2/50/-2/vtx/intact

## 2021-06-23 NOTE — OB PROVIDER H&P - NSHPLABSRESULTS_GEN_ALL_CORE
GTT 80/192/167/95    Sonograms:  11/17/20: + FH, EDC 7/8/21, SIUP  12/28/21: EGA 12w4d, CL 33mm, SIUP, NT 1.0mm  2/16/21: EGA 19w5d, transverse, 3vc, ant placenta, CL 36mm, EFW 336g, left club foot   3/15/21: EGA 23w4d, transverse, 3vc, anterior placenta, EFW 618g (47%), left club foot,   3/30/21: EGA 25w5d, cephalic, 3vc, ant placenta, MVP 5.0cm, EFW 896g (55%), left club foot, BPP 8/8   4/5/21: EGA 26w4d, cephalic, 3vc, ant placenta, MVP 4.2cm, BPP 8/8  6/17/21: EGA 37w0d, vertex, anterior placenta, MVP 7.05cm, BPP 8/8, EFW 3301g (76%)

## 2021-06-23 NOTE — OB RN PATIENT PROFILE - AS SC BRADEN ACTIVITY
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 Melton and Dr. Haynes (4) walks frequently

## 2021-06-23 NOTE — OB PROVIDER H&P - NS_OBGYNHISTORY_OBGYN_ALL_OB_FT
OB Hx:      Gyn Hx:  H/o PCOS, previously treated with OCPs.  Denies h/o fibroids, STIs, or abnormal pap smears.

## 2021-06-23 NOTE — OB PROVIDER H&P - HISTORY OF PRESENT ILLNESS
Rhogam 4/21/21  Fetal echo wnl  Possible club foot of fetus  25yo  @37w6d, dated by LMP, KOSTA 21, here for scheduled induction of labor for h/o chronic HTN (not on meds) and GDMA1. Denies contractions, leakage of fluid, vaginal bleeding. Reports good fetal movement. FS have been well controlled, FS <90, 2hr PP <120. Not on medications for HTN. S/p Rhogam 21. Fetal echo wnl. Fetal left club foot. Denies headache, vision change, SOB, nausea, vomiting, RUQ/epigastric pain, or new onset/worsening swelling. Preeclamptic baseline labs wnl. Denies other complications during pregnancy. GBS negative.

## 2021-06-23 NOTE — OB PROVIDER H&P - ASSESSMENT
27yo  @37w6d, h/o GDMA1, cHTN, for IOL   - admit to L&D  - clear liquid diet  - IVF hydration  - continuous ECM/toco  - monitor vitals  - pain management prn  - f/u admission labs   - f/u PELs, Uprcr  - FS q4 in latent labor, q2 in active labor      and and senior resident aware

## 2021-06-24 PROBLEM — E28.2 POLYCYSTIC OVARIAN SYNDROME: Chronic | Status: ACTIVE | Noted: 2021-06-21

## 2021-06-24 LAB
COVID-19 SPIKE DOMAIN AB INTERP: NEGATIVE — SIGNIFICANT CHANGE UP
COVID-19 SPIKE DOMAIN ANTIBODY RESULT: 0.4 U/ML — SIGNIFICANT CHANGE UP
GLUCOSE BLDC GLUCOMTR-MCNC: 83 MG/DL — SIGNIFICANT CHANGE UP (ref 70–99)
GLUCOSE BLDC GLUCOMTR-MCNC: 87 MG/DL — SIGNIFICANT CHANGE UP (ref 70–99)
SARS-COV-2 IGG+IGM SERPL QL IA: 0.4 U/ML — SIGNIFICANT CHANGE UP
SARS-COV-2 IGG+IGM SERPL QL IA: NEGATIVE — SIGNIFICANT CHANGE UP

## 2021-06-24 PROCEDURE — 59400 OBSTETRICAL CARE: CPT

## 2021-06-24 RX ORDER — HYDROCORTISONE 1 %
1 OINTMENT (GRAM) TOPICAL EVERY 6 HOURS
Refills: 0 | Status: DISCONTINUED | OUTPATIENT
Start: 2021-06-24 | End: 2021-06-25

## 2021-06-24 RX ORDER — DIBUCAINE 1 %
1 OINTMENT (GRAM) RECTAL EVERY 6 HOURS
Refills: 0 | Status: DISCONTINUED | OUTPATIENT
Start: 2021-06-24 | End: 2021-06-25

## 2021-06-24 RX ORDER — PRAMOXINE HYDROCHLORIDE 150 MG/15G
1 AEROSOL, FOAM RECTAL EVERY 4 HOURS
Refills: 0 | Status: DISCONTINUED | OUTPATIENT
Start: 2021-06-24 | End: 2021-06-25

## 2021-06-24 RX ORDER — DIPHENHYDRAMINE HCL 50 MG
25 CAPSULE ORAL ONCE
Refills: 0 | Status: COMPLETED | OUTPATIENT
Start: 2021-06-24 | End: 2021-06-24

## 2021-06-24 RX ORDER — DIPHENHYDRAMINE HCL 50 MG
25 CAPSULE ORAL EVERY 6 HOURS
Refills: 0 | Status: DISCONTINUED | OUTPATIENT
Start: 2021-06-24 | End: 2021-06-25

## 2021-06-24 RX ORDER — IBUPROFEN 200 MG
600 TABLET ORAL EVERY 6 HOURS
Refills: 0 | Status: COMPLETED | OUTPATIENT
Start: 2021-06-24 | End: 2022-05-23

## 2021-06-24 RX ORDER — OXYTOCIN 10 UNIT/ML
333.33 VIAL (ML) INJECTION
Qty: 20 | Refills: 0 | Status: DISCONTINUED | OUTPATIENT
Start: 2021-06-24 | End: 2021-06-25

## 2021-06-24 RX ORDER — ACETAMINOPHEN 500 MG
975 TABLET ORAL
Refills: 0 | Status: DISCONTINUED | OUTPATIENT
Start: 2021-06-24 | End: 2021-06-25

## 2021-06-24 RX ORDER — OXYCODONE HYDROCHLORIDE 5 MG/1
5 TABLET ORAL ONCE
Refills: 0 | Status: DISCONTINUED | OUTPATIENT
Start: 2021-06-24 | End: 2021-06-25

## 2021-06-24 RX ORDER — TETANUS TOXOID, REDUCED DIPHTHERIA TOXOID AND ACELLULAR PERTUSSIS VACCINE, ADSORBED 5; 2.5; 8; 8; 2.5 [IU]/.5ML; [IU]/.5ML; UG/.5ML; UG/.5ML; UG/.5ML
0.5 SUSPENSION INTRAMUSCULAR ONCE
Refills: 0 | Status: DISCONTINUED | OUTPATIENT
Start: 2021-06-24 | End: 2021-06-25

## 2021-06-24 RX ORDER — MAGNESIUM HYDROXIDE 400 MG/1
30 TABLET, CHEWABLE ORAL
Refills: 0 | Status: DISCONTINUED | OUTPATIENT
Start: 2021-06-24 | End: 2021-06-25

## 2021-06-24 RX ORDER — SODIUM CHLORIDE 9 MG/ML
3 INJECTION INTRAMUSCULAR; INTRAVENOUS; SUBCUTANEOUS EVERY 8 HOURS
Refills: 0 | Status: DISCONTINUED | OUTPATIENT
Start: 2021-06-24 | End: 2021-06-25

## 2021-06-24 RX ORDER — KETOROLAC TROMETHAMINE 30 MG/ML
30 SYRINGE (ML) INJECTION ONCE
Refills: 0 | Status: DISCONTINUED | OUTPATIENT
Start: 2021-06-24 | End: 2021-06-24

## 2021-06-24 RX ORDER — LANOLIN
1 OINTMENT (GRAM) TOPICAL EVERY 6 HOURS
Refills: 0 | Status: DISCONTINUED | OUTPATIENT
Start: 2021-06-24 | End: 2021-06-25

## 2021-06-24 RX ORDER — IBUPROFEN 200 MG
600 TABLET ORAL EVERY 6 HOURS
Refills: 0 | Status: DISCONTINUED | OUTPATIENT
Start: 2021-06-24 | End: 2021-06-25

## 2021-06-24 RX ORDER — BENZOCAINE 10 %
1 GEL (GRAM) MUCOUS MEMBRANE EVERY 6 HOURS
Refills: 0 | Status: DISCONTINUED | OUTPATIENT
Start: 2021-06-24 | End: 2021-06-25

## 2021-06-24 RX ORDER — AER TRAVELER 0.5 G/1
1 SOLUTION RECTAL; TOPICAL EVERY 4 HOURS
Refills: 0 | Status: DISCONTINUED | OUTPATIENT
Start: 2021-06-24 | End: 2021-06-25

## 2021-06-24 RX ORDER — OXYCODONE HYDROCHLORIDE 5 MG/1
5 TABLET ORAL
Refills: 0 | Status: DISCONTINUED | OUTPATIENT
Start: 2021-06-24 | End: 2021-06-25

## 2021-06-24 RX ORDER — SIMETHICONE 80 MG/1
80 TABLET, CHEWABLE ORAL EVERY 4 HOURS
Refills: 0 | Status: DISCONTINUED | OUTPATIENT
Start: 2021-06-24 | End: 2021-06-25

## 2021-06-24 RX ORDER — CITRIC ACID/SODIUM CITRATE 300-500 MG
30 SOLUTION, ORAL ORAL ONCE
Refills: 0 | Status: COMPLETED | OUTPATIENT
Start: 2021-06-24 | End: 2021-06-24

## 2021-06-24 RX ADMIN — Medication 975 MILLIGRAM(S): at 16:30

## 2021-06-24 RX ADMIN — Medication 975 MILLIGRAM(S): at 22:00

## 2021-06-24 RX ADMIN — SODIUM CHLORIDE 3 MILLILITER(S): 9 INJECTION INTRAMUSCULAR; INTRAVENOUS; SUBCUTANEOUS at 23:48

## 2021-06-24 RX ADMIN — Medication 975 MILLIGRAM(S): at 16:36

## 2021-06-24 RX ADMIN — SODIUM CHLORIDE 3 MILLILITER(S): 9 INJECTION INTRAMUSCULAR; INTRAVENOUS; SUBCUTANEOUS at 15:57

## 2021-06-24 RX ADMIN — Medication 15 MILLILITER(S): at 01:39

## 2021-06-24 RX ADMIN — Medication 30 MILLIGRAM(S): at 13:59

## 2021-06-24 RX ADMIN — Medication 1 TABLET(S): at 16:32

## 2021-06-24 RX ADMIN — Medication 975 MILLIGRAM(S): at 21:32

## 2021-06-24 RX ADMIN — Medication 1000 MILLIUNIT(S)/MIN: at 13:27

## 2021-06-24 NOTE — PROGRESS NOTE ADULT - SUBJECTIVE AND OBJECTIVE BOX
PGY1 Note    Patient seen at bedside. No complaints at the moment.    T(F): 97.8 ( @ 20:05), Max: 98 ( @ 18:59)  HR: 70 ( @ 02:56)  BP: 114/72 ( @ 02:56) (108/71 - 132/87)  RR: 18 ( @ 20:05)  EFM: 130BPM/mod bridger/accels pos  TOCO: q2-3m  SVE: 1.5/50/-2 vtx by sono, intact    Medications:  citric acid/sodium citrate Solution: 15 ( @ 01:39)  oxytocin Infusion.: 2 ( @ 23:35)      Labs:                        12.8   9.57  )-----------( 156      ( 2021 20:05 )             38.4         137  |  105  |  8<L>  ----------------------------<  78  3.9   |  21  |  0.6<L>    Ca    9.7      2021 20:05    TPro  6.8  /  Alb  4.1  /  TBili  0.3  /  DBili  x   /  AST  14  /  ALT  17  /  AlkPhos  157<H>      Prenatal Syphilis Test: Nonreact ( @ 20:05)  Uric Acid, Serum: 3.7 mg/dL ( @ 20:05)  Antibody Screen: NEG (21 @ 20:05)    Urinalysis Basic - ( 2021 20:00 )    Color: Light Yellow / Appearance: Clear / S.007 / pH: x  Gluc: x / Ketone: Negative  / Bili: Negative / Urobili: <2 mg/dL   Blood: x / Protein: Negative / Nitrite: Negative   Leuk Esterase: Small / RBC: 1 /HPF / WBC 6 /HPF   Sq Epi: x / Non Sq Epi: 3 /HPF / Bacteria: Few

## 2021-06-24 NOTE — PROGRESS NOTE ADULT - ASSESSMENT
A/P:   26y  at 69l0pGW, GBS neg, IOL for CHTN, GDMA1, s/p epidural, on pitocin, in labor progressing well.  -Continue current management   -Pain management prn  -Continuous EFM/toco  -F/u pending labs  -Reevaluate     Dr. Woodward aware, Dr. Avila to be made aware  
26y  at 38w0d, GBS neg, IOL for cHTN, GDMA1, in labor.    - Cont EFM/[place]  - IV Hydration  - Pain Management prn  - Monitor vitals  - Clear Liquids    Dr. Gallagher and ASHLEY Thakkar aware

## 2021-06-24 NOTE — PROGRESS NOTE ADULT - SUBJECTIVE AND OBJECTIVE BOX
PGY2 Note    S: Patient seen and examined at bedside. Doing well, complaining of painful contractions.    Vital Signs Last 24 Hrs  T(C): 36.8 (2021 00:03), Max: 36.8 (2021 00:03)  T(F): 98.24 (2021 00:03), Max: 98.24 (2021 00:03)  HR: 78 (2021 09:55) (57 - 95)  BP: 124/84 (2021 09:55) (103/56 - 146/83)  RR: 18 (2021 02:23) (18 - 18)  SpO2: 98% (2021 09:54) (95% - 100%)    EFM: 150/mod/+accels  TOCO: q4m  SVE: 9.5/100/0 per Dr. Gallagher @0922    Labs:                        12.8   9.57  )-----------( 156      ( 2021 20:05 )             38.4           137  |  105  |  8<L>  ----------------------------<  78  3.9   |  21  |  0.6<L>    Ca    9.7      2021 20:05    TPro  6.8  /  Alb  4.1  /  TBili  0.3  /  DBili  x   /  AST  14  /  ALT  17  /  AlkPhos  157<H>  -23    ABO RH Interpretation: O NEG (21 @ 20:05)    Urinalysis Basic - ( 2021 20:00 )    Color: Light Yellow / Appearance: Clear / S.007 / pH: x  Gluc: x / Ketone: Negative  / Bili: Negative / Urobili: <2 mg/dL   Blood: x / Protein: Negative / Nitrite: Negative   Leuk Esterase: Small / RBC: 1 /HPF / WBC 6 /HPF   Sq Epi: x / Non Sq Epi: 3 /HPF / Bacteria: Few    Prenatal Syphilis Test: Nonreact (21 @ 20:05)    UDS: neg  Covid: neg    Meds:  Epi: @0230  Pitocin: startd @1577, d/c @9189

## 2021-06-24 NOTE — OB PROVIDER DELIVERY SUMMARY - NSPROVIDERDELIVERYNOTE_OBGYN_ALL_OB_FT
Pt fully dilated and pushing. Head delivered OA and restituted to NISHANT. Anterior shoulder delivered followed by posterior shoulder and rest of the body without difficulty. Baby suctioned, cord clamped and cut x 2 and handed to mother. Cord segment and gases obtained, placenta delivered intact with membranes without difficulty. Vaginal vault, perineum and cervix inspected and 2nd degree laceration noted and repaired in usual fashion. Live male infant delivered, apgars 9/9

## 2021-06-24 NOTE — PROCEDURE NOTE - ADDITIONAL PROCEDURE DETAILS
03.15am Patient stable and comfortable.  VSS 03.15am Patient stable and comfortable.  VSS    REDOSE @ 0730  Pain: 8/10  V/E 5cm  Bupi 0.25% 8cc + fentanyl 50mcg  Given in increments after  -ve aspiration  VSS analgesia at T10    REDOSE @ 0815  Pain: 10/10 R side L1-2  V/E not done  Medication: Lidocaine 2% 5cc + fentanyl 50mcg  Given in increments after  -ve aspiration  VSS analgesia at T10  V/E after redose 9cm   wnl 03.15am Patient stable and comfortable.  VSS    REDOSE @ 0730  Pain: 8/10  V/E 5cm  Bupi 0.25% 8cc + fentanyl 50mcg  Given in increments after  -ve aspiration  VSS analgesia at T10    REDOSE @ 0815  Pain: 10/10 R side L1-2  V/E not done  Medication: Lidocaine 2% 5cc + fentanyl 50mcg  Given in increments after  -ve aspiration  VSS analgesia at T10   V/E after redose 9cm  FH wnl    TOP OFF @ 10:30 am   C/O pain and pressure, more to right side  Lidocaine 2% 7cc via epidural, VSS stable 03.15am Patient stable and comfortable.  VSS    REDOSE @ 0730  Pain: 8/10  V/E 5cm  Bupi 0.25% 8cc + fentanyl 50mcg  Given in increments after  -ve aspiration  VSS analgesia at T10    REDOSE @ 0815  Pain: 10/10 R side L1-2  V/E not done  Medication: Lidocaine 2% 5cc + fentanyl 50mcg  Given in increments after  -ve aspiration  VSS analgesia at T10   V/E after redose 9cm  FH wnl    TOP OFF @ 10:30 am   C/O pain and pressure, more to right side  Lidocaine 2% 7cc via epidural, VSS stable      Vital signs stable  No anesthesia complications from labor epidural  Patient discharged to OB post partum care as per policy

## 2021-06-24 NOTE — PROCEDURAL SAFETY CHECKLIST WITH OR WITHOUT SEDATION - NSPREPROCEDSEDAT_GEN_ALL_CORE
47-year-old male presented to the emergency department with a right upper apical abscess for the last few days. The area was thoroughly examined and a dental block was performed. An 18-gauge needle was used to incise and drain the area. Pus was expressed. Patient will be sent home with Augmentin, Peridex, Magic mouthwash and ibuprofen. Patient will be given small dose of pain medication as well. Patient was told he cannot drive on this medication. Patient will be given the name of 7 different dental clinics for which he needs to follow-up in the morning for further management. Patient currently displaying septic signs of infection such as fever, chills, hypotension at this time. Patient currently denying headaches, blurry vision, chest pain, shortness of breath, fever, chills, nausea, vomiting, severe abdominal pain, change in bowel or bladder movements or any numbness or weakness bilaterally in his extremities. The patient is stable for discharge . Patient was explicitly instructed on specific signs and symptoms on which to return to the emergency room for. Patient was instructed to return to the ER for any new or worsening symptoms. Additional discharge instructions were given verbally. All questions were answered. Patient is comfortable and agreeable with discharge plan. Patient in no acute distress and non-toxic in appearance. the emergency provider has spoken with the patient and discussed todays results, in addition to providing specific details for the plan of care and counseling regarding the diagnosis and prognosis. Questions are answered at this time and they are agreeable with the plan. Assessment      1. Dental abscess    2. Dentalgia    3.  Dental infection      Plan   Discharge to home  Patient condition is stable    New Medications     New Prescriptions    AMOXICILLIN-CLAVULANATE (AUGMENTIN) 875-125 MG PER TABLET    Take 1 tablet by mouth 2 times daily (with meals) for 10 days
without sedation

## 2021-06-24 NOTE — OB RN DELIVERY SUMMARY - NSSELHIDDEN_OBGYN_ALL_OB_FT
[NS_DeliveryAttending1_OBGYN_ALL_OB_FT:[id number]],[NS_DeliveryRN_OBGYN_ALL_OB_FT:[id number]]

## 2021-06-24 NOTE — OB PROVIDER DELIVERY SUMMARY - NSSELHIDDEN_OBGYN_ALL_OB_FT
[NS_DeliveryAttending1_OBGYN_ALL_OB_FT:[id number]],[NS_DeliveryRN_OBGYN_ALL_OB_FT:[id number]=]

## 2021-06-25 ENCOUNTER — TRANSCRIPTION ENCOUNTER (OUTPATIENT)
Age: 26
End: 2021-06-25

## 2021-06-25 VITALS
RESPIRATION RATE: 19 BRPM | DIASTOLIC BLOOD PRESSURE: 76 MMHG | HEART RATE: 77 BPM | SYSTOLIC BLOOD PRESSURE: 142 MMHG | TEMPERATURE: 97 F

## 2021-06-25 LAB
BASOPHILS # BLD AUTO: 0.05 K/UL — SIGNIFICANT CHANGE UP (ref 0–0.2)
BASOPHILS NFR BLD AUTO: 0.3 % — SIGNIFICANT CHANGE UP (ref 0–1)
COVID-19 SPIKE DOMAIN AB INTERP: NEGATIVE — SIGNIFICANT CHANGE UP
COVID-19 SPIKE DOMAIN ANTIBODY RESULT: 0.4 U/ML — SIGNIFICANT CHANGE UP
EOSINOPHIL # BLD AUTO: 0.08 K/UL — SIGNIFICANT CHANGE UP (ref 0–0.7)
EOSINOPHIL NFR BLD AUTO: 0.6 % — SIGNIFICANT CHANGE UP (ref 0–8)
FETAL SCREEN: SIGNIFICANT CHANGE UP
GLUCOSE BLDC GLUCOMTR-MCNC: 78 MG/DL — SIGNIFICANT CHANGE UP (ref 70–99)
HCT VFR BLD CALC: 33.9 % — LOW (ref 37–47)
HGB BLD-MCNC: 10.8 G/DL — LOW (ref 12–16)
IMM GRANULOCYTES NFR BLD AUTO: 0.7 % — HIGH (ref 0.1–0.3)
LYMPHOCYTES # BLD AUTO: 21.4 % — SIGNIFICANT CHANGE UP (ref 20.5–51.1)
LYMPHOCYTES # BLD AUTO: 3.07 K/UL — SIGNIFICANT CHANGE UP (ref 1.2–3.4)
MCHC RBC-ENTMCNC: 26.4 PG — LOW (ref 27–31)
MCHC RBC-ENTMCNC: 31.9 G/DL — LOW (ref 32–37)
MCV RBC AUTO: 82.9 FL — SIGNIFICANT CHANGE UP (ref 81–99)
MONOCYTES # BLD AUTO: 1.66 K/UL — HIGH (ref 0.1–0.6)
MONOCYTES NFR BLD AUTO: 11.6 % — HIGH (ref 1.7–9.3)
NEUTROPHILS # BLD AUTO: 9.36 K/UL — HIGH (ref 1.4–6.5)
NEUTROPHILS NFR BLD AUTO: 65.4 % — SIGNIFICANT CHANGE UP (ref 42.2–75.2)
NRBC # BLD: 0 /100 WBCS — SIGNIFICANT CHANGE UP (ref 0–0)
PLATELET # BLD AUTO: 166 K/UL — SIGNIFICANT CHANGE UP (ref 130–400)
RBC # BLD: 4.09 M/UL — LOW (ref 4.2–5.4)
RBC # FLD: 13.5 % — SIGNIFICANT CHANGE UP (ref 11.5–14.5)
SARS-COV-2 IGG+IGM SERPL QL IA: 0.4 U/ML — SIGNIFICANT CHANGE UP
SARS-COV-2 IGG+IGM SERPL QL IA: NEGATIVE — SIGNIFICANT CHANGE UP
WBC # BLD: 14.32 K/UL — HIGH (ref 4.8–10.8)
WBC # FLD AUTO: 14.32 K/UL — HIGH (ref 4.8–10.8)

## 2021-06-25 RX ORDER — IBUPROFEN 200 MG
1 TABLET ORAL
Qty: 0 | Refills: 0 | DISCHARGE
Start: 2021-06-25

## 2021-06-25 RX ORDER — ACETAMINOPHEN 500 MG
3 TABLET ORAL
Qty: 0 | Refills: 0 | DISCHARGE
Start: 2021-06-25

## 2021-06-25 RX ADMIN — Medication 600 MILLIGRAM(S): at 01:00

## 2021-06-25 RX ADMIN — Medication 600 MILLIGRAM(S): at 00:02

## 2021-06-25 RX ADMIN — Medication 600 MILLIGRAM(S): at 06:39

## 2021-06-25 RX ADMIN — SODIUM CHLORIDE 3 MILLILITER(S): 9 INJECTION INTRAMUSCULAR; INTRAVENOUS; SUBCUTANEOUS at 13:10

## 2021-06-25 RX ADMIN — SODIUM CHLORIDE 3 MILLILITER(S): 9 INJECTION INTRAMUSCULAR; INTRAVENOUS; SUBCUTANEOUS at 06:38

## 2021-06-25 RX ADMIN — Medication 600 MILLIGRAM(S): at 07:39

## 2021-06-25 RX ADMIN — Medication 1 TABLET(S): at 12:07

## 2021-06-25 RX ADMIN — MAGNESIUM HYDROXIDE 30 MILLILITER(S): 400 TABLET, CHEWABLE ORAL at 10:56

## 2021-06-25 NOTE — DISCHARGE NOTE OB - CARE PROVIDER_API CALL
Gerald Hitchcock)  Obstetrics and Gynecology  [facility] [place] [address]  Phone: [phone]  Fax: [phone]  Follow Up Time:

## 2021-06-25 NOTE — DISCHARGE NOTE OB - MEDICATION SUMMARY - MEDICATIONS TO TAKE
I will START or STAY ON the medications listed below when I get home from the hospital:    acetaminophen 325 mg oral tablet  -- 3 tab(s) by mouth   -- Indication: For postpartum    ibuprofen 600 mg oral tablet  -- 1 tab(s) by mouth every 6 hours  -- Indication: For postpartum

## 2021-06-25 NOTE — PROGRESS NOTE ADULT - SUBJECTIVE AND OBJECTIVE BOX
Subjective:   Patient doing well. No complaints. Minimal lochia. Pain controlled.    Objective:   Vital Signs Last 24 Hrs  T(C): 35.8 (2021 07:42), Max: 36.9 (2021 13:39)  T(F): 96.4 (2021 07:42), Max: 98.5 (2021 13:39)  HR: 73 (2021 07:42) (59 - 98)  BP: 111/63 (2021 07:42) (98/63 - 146/83)  BP(mean): --  RR: 18 (2021 07:42) (16 - 18)  SpO2: 98% (2021 04:47) (83% - 100%)  Gen: NAD  Abd: Soft, Nontender, Nondistended, Fundus firm below the umbilicus  Ext: no tendern, mild edema    Labs:                        10.8   14.32 )-----------( 166      ( 2021 06:38 )             33.9       Medications:  acetaminophen   Tablet .. 975 milliGRAM(s) Oral <User Schedule>  benzocaine 20%/menthol 0.5% Spray 1 Spray(s) Topical every 6 hours PRN  dibucaine 1% Ointment 1 Application(s) Topical every 6 hours PRN  diphenhydrAMINE 25 milliGRAM(s) Oral every 6 hours PRN  diphtheria/tetanus/pertussis (acellular) Vaccine (ADAcel) 0.5 milliLiter(s) IntraMuscular once  hydrocortisone 1% Cream 1 Application(s) Topical every 6 hours PRN  ibuprofen  Tablet. 600 milliGRAM(s) Oral every 6 hours  lanolin Ointment 1 Application(s) Topical every 6 hours PRN  magnesium hydroxide Suspension 30 milliLiter(s) Oral two times a day PRN  oxyCODONE    IR 5 milliGRAM(s) Oral every 3 hours PRN  oxyCODONE    IR 5 milliGRAM(s) Oral once PRN  oxytocin Infusion 333.333 milliUNIT(s)/Min IV Continuous <Continuous>  pramoxine 1%/zinc 5% Cream 1 Application(s) Topical every 4 hours PRN  prenatal multivitamin 1 Tablet(s) Oral daily  simethicone 80 milliGRAM(s) Chew every 4 hours PRN  sodium chloride 0.9% lock flush 3 milliLiter(s) IV Push every 8 hours  witch hazel Pads 1 Application(s) Topical every 4 hours PRN      Diet: regular  Passed flatus, passed bowel movement  Breast and Bottle feeding    Assessment:   26y s/p , d/c home on PPD 1  Plan:  Routine postpartum care  Encouraged ambulation and PO hydration  Tolerating regular diet  d/c home

## 2021-06-25 NOTE — DISCHARGE NOTE OB - PLAN OF CARE
healthy mom and baby No heavy lifting. Nothing inside the vagina for 6 weeks: no tampons, douching, sexual intercourse, tub baths or pools. If you have a fever over 100.4F, severe abdominal pain or heavy vaginal bleeding please call your doctor or go to the emergency room.    Follow up in 6 weeks for postpartum check.

## 2021-06-25 NOTE — DISCHARGE NOTE OB - CARE PLAN
Goal:	healthy mom and baby  Assessment and plan of treatment:	No heavy lifting. Nothing inside the vagina for 6 weeks: no tampons, douching, sexual intercourse, tub baths or pools. If you have a fever over 100.4F, severe abdominal pain or heavy vaginal bleeding please call your doctor or go to the emergency room.    Follow up in 6 weeks for postpartum check.   Principal Discharge DX:	Vaginal delivery  Goal:	healthy mom and baby  Assessment and plan of treatment:	No heavy lifting. Nothing inside the vagina for 6 weeks: no tampons, douching, sexual intercourse, tub baths or pools. If you have a fever over 100.4F, severe abdominal pain or heavy vaginal bleeding please call your doctor or go to the emergency room.    Follow up in 6 weeks for postpartum check.

## 2021-06-25 NOTE — DISCHARGE NOTE OB - PATIENT PORTAL LINK FT
You can access the FollowMyHealth Patient Portal offered by [facility] by registering at the following website: [email or web address]. By joining [employer] [facility]’s FollowMyHealth portal, you will also be able to view your health information using other applications (apps) compatible with our system.

## 2021-07-01 DIAGNOSIS — O35.9XX0 MATERNAL CARE FOR (SUSPECTED) FETAL ABNORMALITY AND DAMAGE, UNSPECIFIED, NOT APPLICABLE OR UNSPECIFIED: ICD-10-CM

## 2021-07-01 DIAGNOSIS — O26.893 OTHER SPECIFIED PREGNANCY RELATED CONDITIONS, THIRD TRIMESTER: ICD-10-CM

## 2021-07-01 DIAGNOSIS — Z34.83 ENCOUNTER FOR SUPERVISION OF OTHER NORMAL PREGNANCY, THIRD TRIMESTER: ICD-10-CM

## 2021-07-01 DIAGNOSIS — Z3A.37 37 WEEKS GESTATION OF PREGNANCY: ICD-10-CM

## 2021-07-01 DIAGNOSIS — Z67.41 TYPE O BLOOD, RH NEGATIVE: ICD-10-CM

## 2021-07-01 DIAGNOSIS — E28.2 POLYCYSTIC OVARIAN SYNDROME: ICD-10-CM

## 2021-07-31 NOTE — OB PROVIDER TRIAGE NOTE - NS_CHIEFCOMPLAINTOTHER_OBGYN_ALL_OB_FT
elevated blood pressures
No pertinent past medical history
<<----- Click to add NO pertinent Past Medical History
Alert and oriented to person, place and time

## 2021-08-10 PROBLEM — I10 ESSENTIAL (PRIMARY) HYPERTENSION: Chronic | Status: ACTIVE | Noted: 2021-06-23

## 2021-08-18 ENCOUNTER — APPOINTMENT (OUTPATIENT)
Dept: OBGYN | Facility: CLINIC | Age: 26
End: 2021-08-18
Payer: COMMERCIAL

## 2021-08-18 VITALS
SYSTOLIC BLOOD PRESSURE: 145 MMHG | WEIGHT: 176 LBS | HEIGHT: 69 IN | RESPIRATION RATE: 20 BRPM | BODY MASS INDEX: 26.07 KG/M2 | HEART RATE: 66 BPM | DIASTOLIC BLOOD PRESSURE: 89 MMHG

## 2021-08-18 PROCEDURE — 0503F POSTPARTUM CARE VISIT: CPT

## 2021-08-18 NOTE — HISTORY OF PRESENT ILLNESS
[Postpartum Follow Up] : postpartum follow up [Complications:___] : no complications [Last Pap Date: ___] : Last Pap Date: [unfilled] [Delivery Date: ___] : on [unfilled] [] : delivered by vaginal delivery [Male] : Delivery History: baby boy [Breastfeeding] : not currently nursing [Resumed Menses] : has not resumed her menses [Resumed [place]] : has not resumed intercourse [Intended Contraception] : Intended Contraception: [Oral Contraceptives] : oral contraceptives [Back to Normal] : is back to normal in size [Normal] : the vagina was normal [Healing Well] : is healing well [Not Done] : Examination of breasts not done [Doing Well] : is doing well [No Sign of Infection] : is showing no signs of infection [None] : None [de-identified] : Rx given OCP"s

## 2022-01-07 NOTE — OB PROVIDER DELIVERY SUMMARY - NS_ROMTYPE_OBGYN_ALL_OB
Mom called because they were in for a covid swab and were told results would come i today and mom hasn't recieved a phone call yet if you can please give mom a call back when they do come in, thank you.     AROM

## 2022-09-19 NOTE — OB PROVIDER DELIVERY SUMMARY - NSPPHNORISK_OBGYN_ALL_OB
After evaluating the patient it has been determined they are at risk for postpartum hemorrhage.
4 = No assist / stand by assistance

## 2022-11-21 ENCOUNTER — APPOINTMENT (OUTPATIENT)
Dept: OBGYN | Facility: CLINIC | Age: 27
End: 2022-11-21

## 2022-11-21 VITALS
SYSTOLIC BLOOD PRESSURE: 140 MMHG | BODY MASS INDEX: 28.29 KG/M2 | DIASTOLIC BLOOD PRESSURE: 100 MMHG | HEIGHT: 69 IN | WEIGHT: 191 LBS

## 2022-11-21 DIAGNOSIS — Z3A.27 27 WEEKS GESTATION OF PREGNANCY: ICD-10-CM

## 2022-11-21 DIAGNOSIS — Z34.02 ENCOUNTER FOR SUPERVISION OF NORMAL FIRST PREGNANCY, SECOND TRIMESTER: ICD-10-CM

## 2022-11-21 DIAGNOSIS — R11.10 VOMITING, UNSPECIFIED: ICD-10-CM

## 2022-11-21 DIAGNOSIS — Z87.42 PERSONAL HISTORY OF OTHER DISEASES OF THE FEMALE GENITAL TRACT: ICD-10-CM

## 2022-11-21 DIAGNOSIS — Z34.00 ENCOUNTER FOR SUPERVISION OF NORMAL FIRST PREGNANCY, UNSPECIFIED TRIMESTER: ICD-10-CM

## 2022-11-21 DIAGNOSIS — Z86.32 PERSONAL HISTORY OF GESTATIONAL DIABETES: ICD-10-CM

## 2022-11-21 DIAGNOSIS — O09.90 SUPERVISION OF HIGH RISK PREGNANCY, UNSPECIFIED, UNSPECIFIED TRIMESTER: ICD-10-CM

## 2022-11-21 DIAGNOSIS — O35.9XX0 MATERNAL CARE FOR (SUSPECTED) FETAL ABNORMALITY AND DAMAGE, UNSPECIFIED, NOT APPLICABLE OR UNSPECIFIED: ICD-10-CM

## 2022-11-21 PROCEDURE — 76830 TRANSVAGINAL US NON-OB: CPT

## 2022-11-21 PROCEDURE — 99395 PREV VISIT EST AGE 18-39: CPT | Mod: 25

## 2022-11-21 NOTE — HISTORY OF PRESENT ILLNESS
[FreeTextEntry1] : 26 yo , LMP 10/8/2022 presents for new pregnancy and annual exam. Reports bleeding started yesterday, brown to red, now heavy.\par  x 1, GDM, chronic HTN Yes

## 2022-11-21 NOTE — DISCUSSION/SUMMARY
[FreeTextEntry1] : 26 yo for annual exam, likely chemical pregnancy\par -f/u labs\par - unable to perform pap today due to heavy bleeding, advised to return for pap smear\par -rh negative, will need rhogam is hcg is positive.

## 2022-11-21 NOTE — PROCEDURE
[Threatened Miscarriage] : threatened miscarriage [Transvaginal Ultrasound] : transvaginal ultrasound [Retroverted] : retroverted [No Fibroid(s)] : no fibroid(s) [L: ___ cm] : L: [unfilled] cm [W: ___cm] : W: [unfilled] cm [H: ___ cm] : H: [unfilled] cm [FreeTextEntry5] : ES: 0.86, no GS [FreeTextEntry7] : 4.58 x 1.71 [FreeTextEntry8] : 2.58 x 1.86 [FreeTextEntry6] : cervix: 2.11 [FreeTextEntry4] : normal TVUS, no evidence of intrauterine or adnexal preganncy.

## 2022-11-21 NOTE — PHYSICAL EXAM
[Appropriately responsive] : appropriately responsive [Soft] : soft [Non-tender] : non-tender [Non-distended] : non-distended [No Lesions] : no lesions [No Mass] : no mass [Labia Majora] : normal [Labia Minora] : normal [Moderate] : There was moderate vaginal bleeding [Normal] : normal [Uterine Adnexae] : normal

## 2022-11-23 DIAGNOSIS — O20.9 HEMORRHAGE IN EARLY PREGNANCY, UNSPECIFIED: ICD-10-CM

## 2022-11-23 LAB
ABO + RH PNL BLD: NORMAL
BASOPHILS # BLD AUTO: 0.06 K/UL
BASOPHILS NFR BLD AUTO: 0.9 %
BLD GP AB SCN SERPL QL: NORMAL
C TRACH RRNA SPEC QL NAA+PROBE: NOT DETECTED
EOSINOPHIL # BLD AUTO: 0.14 K/UL
EOSINOPHIL NFR BLD AUTO: 2.1 %
HCG SERPL-MCNC: 16.8 MIU/ML
HCT VFR BLD CALC: 48.1 %
HGB BLD-MCNC: 15.6 G/DL
IMM GRANULOCYTES NFR BLD AUTO: 0.4 %
LYMPHOCYTES # BLD AUTO: 2.18 K/UL
LYMPHOCYTES NFR BLD AUTO: 32.6 %
MAN DIFF?: NORMAL
MCHC RBC-ENTMCNC: 27.1 PG
MCHC RBC-ENTMCNC: 32.4 G/DL
MCV RBC AUTO: 83.5 FL
MONOCYTES # BLD AUTO: 0.65 K/UL
MONOCYTES NFR BLD AUTO: 9.7 %
N GONORRHOEA RRNA SPEC QL NAA+PROBE: NOT DETECTED
NEUTROPHILS # BLD AUTO: 3.63 K/UL
NEUTROPHILS NFR BLD AUTO: 54.3 %
PLATELET # BLD AUTO: 258 K/UL
PROGEST SERPL-MCNC: 0.3 NG/ML
RBC # BLD: 5.76 M/UL
RBC # FLD: 13.2 %
SOURCE AMPLIFICATION: NORMAL
WBC # FLD AUTO: 6.69 K/UL

## 2022-11-26 LAB — HCG SERPL-MCNC: 4.4 MIU/ML

## 2023-02-27 ENCOUNTER — APPOINTMENT (OUTPATIENT)
Dept: OBGYN | Facility: CLINIC | Age: 28
End: 2023-02-27
Payer: COMMERCIAL

## 2023-02-27 VITALS
SYSTOLIC BLOOD PRESSURE: 122 MMHG | WEIGHT: 190 LBS | HEIGHT: 69 IN | BODY MASS INDEX: 28.14 KG/M2 | DIASTOLIC BLOOD PRESSURE: 80 MMHG

## 2023-02-27 DIAGNOSIS — O20.9 HEMORRHAGE IN EARLY PREGNANCY, UNSPECIFIED: ICD-10-CM

## 2023-02-27 DIAGNOSIS — Z87.42 PERSONAL HISTORY OF OTHER DISEASES OF THE FEMALE GENITAL TRACT: ICD-10-CM

## 2023-02-27 LAB
ALBUMIN SERPL ELPH-MCNC: 4.5 G/DL
ALP BLD-CCNC: 67 U/L
ALT SERPL-CCNC: 18 U/L
ANION GAP SERPL CALC-SCNC: 11 MMOL/L
AST SERPL-CCNC: 18 U/L
BASOPHILS # BLD AUTO: 0.05 K/UL
BASOPHILS NFR BLD AUTO: 0.9 %
BILIRUB SERPL-MCNC: 0.5 MG/DL
BUN SERPL-MCNC: 10 MG/DL
CALCIUM SERPL-MCNC: 9.8 MG/DL
CHLORIDE SERPL-SCNC: 103 MMOL/L
CO2 SERPL-SCNC: 28 MMOL/L
CREAT SERPL-MCNC: 0.8 MG/DL
EGFR: 102 ML/MIN/1.73M2
EOSINOPHIL # BLD AUTO: 0.15 K/UL
EOSINOPHIL NFR BLD AUTO: 2.6 %
ESTIMATED AVERAGE GLUCOSE: 100 MG/DL
GLUCOSE SERPL-MCNC: 90 MG/DL
HBA1C MFR BLD HPLC: 5.1 %
HCG SERPL-MCNC: <1 MIU/ML
HCT VFR BLD CALC: 44.9 %
HGB BLD-MCNC: 14.6 G/DL
IMM GRANULOCYTES NFR BLD AUTO: 0.5 %
LYMPHOCYTES # BLD AUTO: 1.92 K/UL
LYMPHOCYTES NFR BLD AUTO: 32.7 %
MAN DIFF?: NORMAL
MCHC RBC-ENTMCNC: 27.3 PG
MCHC RBC-ENTMCNC: 32.5 G/DL
MCV RBC AUTO: 83.9 FL
MONOCYTES # BLD AUTO: 0.53 K/UL
MONOCYTES NFR BLD AUTO: 9 %
NEUTROPHILS # BLD AUTO: 3.2 K/UL
NEUTROPHILS NFR BLD AUTO: 54.3 %
PLATELET # BLD AUTO: 233 K/UL
POTASSIUM SERPL-SCNC: 4.1 MMOL/L
PROT SERPL-MCNC: 7.5 G/DL
RBC # BLD: 5.35 M/UL
RBC # FLD: 13.6 %
SODIUM SERPL-SCNC: 142 MMOL/L
TSH SERPL-ACNC: 1.7 UIU/ML
WBC # FLD AUTO: 5.88 K/UL

## 2023-02-27 PROCEDURE — 36415 COLL VENOUS BLD VENIPUNCTURE: CPT

## 2023-02-27 PROCEDURE — 99395 PREV VISIT EST AGE 18-39: CPT | Mod: 25

## 2023-02-27 PROCEDURE — 76830 TRANSVAGINAL US NON-OB: CPT

## 2023-02-27 NOTE — HISTORY OF PRESENT ILLNESS
[FreeTextEntry1] : 28 yo  presents for WWE and secondary infertility. LMP 2023, upreg negative in office today. She has been trying for pregnancy x 7 months. Reports taking OPK's but not getting positive results. H/o PCOS. \par  obhx:  x 1, GDM, chronic hypertension, SAB x1 2022. \par gynhx: h/o PCOS

## 2023-02-27 NOTE — DISCUSSION/SUMMARY
[FreeTextEntry1] : 28 yo for clomid\par - rx given provera and clomid, risks of multifetal gestation discussed w/ patient. Advised how to use and side effects\par -f/u labs and pap smear\par - f/u day 21 progesterone

## 2023-02-27 NOTE — PROCEDURE
[Amenorrhea] : Amenorrhea [Transvaginal Ultrasound] : transvaginal ultrasound [Retroverted] : retroverted [No Fibroid(s)] : no fibroid(s) [L: ___ cm] : L: [unfilled] cm [W: ___cm] : W: [unfilled] cm [H: ___ cm] : H: [unfilled] cm [FreeTextEntry5] : ES: 0.86cm [FreeTextEntry7] : 3.75 x 1.97 w/ multiple follicles, and CL cyst [FreeTextEntry8] : 2.44 x 2.04, mulytiple follicles [FreeTextEntry6] : cervix: 3.27 cm [FreeTextEntry4] : normal TVUS, PCOS appearing ovaries

## 2023-02-28 ENCOUNTER — NON-APPOINTMENT (OUTPATIENT)
Age: 28
End: 2023-02-28

## 2023-03-14 LAB
CYTOLOGY CVX/VAG DOC THIN PREP: NORMAL
ESTRADIOL SERPL-MCNC: 68 PG/ML
FSH SERPL-MCNC: 3.1 IU/L
PROLACTIN SERPL-MCNC: 15 NG/ML
TESTOST FREE SERPL-MCNC: 1.5 PG/ML
TESTOST SERPL-MCNC: 20.9 NG/DL

## 2023-03-21 ENCOUNTER — NON-APPOINTMENT (OUTPATIENT)
Age: 28
End: 2023-03-21

## 2023-03-25 LAB — PROGEST SERPL-MCNC: 9.5 NG/ML

## 2023-04-17 ENCOUNTER — APPOINTMENT (OUTPATIENT)
Dept: OBGYN | Facility: CLINIC | Age: 28
End: 2023-04-17
Payer: COMMERCIAL

## 2023-04-17 VITALS
HEIGHT: 69 IN | DIASTOLIC BLOOD PRESSURE: 110 MMHG | SYSTOLIC BLOOD PRESSURE: 149 MMHG | BODY MASS INDEX: 29.62 KG/M2 | WEIGHT: 200 LBS

## 2023-04-17 LAB
BILIRUB UR QL STRIP: NORMAL
CLARITY UR: CLEAR
COLLECTION METHOD: NORMAL
GLUCOSE UR-MCNC: NORMAL
HCG UR QL: 0.2 EU/DL
HGB UR QL STRIP.AUTO: NORMAL
KETONES UR-MCNC: NORMAL
LEUKOCYTE ESTERASE UR QL STRIP: NORMAL
NITRITE UR QL STRIP: NORMAL
PH UR STRIP: 7
PROT UR STRIP-MCNC: NORMAL
SP GR UR STRIP: 1

## 2023-04-17 PROCEDURE — 81002 URINALYSIS NONAUTO W/O SCOPE: CPT

## 2023-04-17 PROCEDURE — 99215 OFFICE O/P EST HI 40 MIN: CPT | Mod: 25

## 2023-04-17 PROCEDURE — 76817 TRANSVAGINAL US OBSTETRIC: CPT

## 2023-04-17 NOTE — PROCEDURE
[R/O Multiple Pregnancy] : rule out multiple pregnancy [Transvaginal Ultrasound] : transvaginal ultrasound [Anteverted] : anteverted [No Fibroid(s)] : no fibroid(s) [L: ___ cm] : L: [unfilled] cm [W: ___cm] : W: [unfilled] cm [H: ___ cm] : H: [unfilled] cm [FreeTextEntry5] : ES: di-di twin IUP, Baby A 6wk5d GA,. + FH, Baby B: 6wk4d GA, + FH [FreeTextEntry7] : 2.53 x 2.17 cm [FreeTextEntry8] : 2.28 x 1.79 cm [FreeTextEntry6] : cervix: 3.91 cm [FreeTextEntry4] : rosibel-di twin IUP, KOSTA 12/6/2023

## 2023-04-17 NOTE — COUNSELING
[Nutrition/ Exercise/ Weight Management] : nutrition, exercise, weight management [Body Image] : body image [Contraception/ Emergency Contraception/ Safe Sexual Practices] : contraception, emergency contraception, safe sexual practices [Preconception Care/ Fertility options] : preconception care, fertility options [Pregnancy Options] : pregnancy options [Lab Results] : lab results [Medication Management] : medication management

## 2023-04-17 NOTE — HISTORY OF PRESENT ILLNESS
[FreeTextEntry1] : 29 yo  for new pregnancy, LMP 3/1/2023, conceived on clomid. \par + nausea. Denies any VB.

## 2023-04-17 NOTE — DISCUSSION/SUMMARY
[FreeTextEntry1] : 27 yo , h/o GDMA2, chronic HTN, di-di twin IUP, KOSTA 2023\par -f/u 2 weeks\par - will drawn PNL next visit\par - referral given for NT and NIPT

## 2023-05-01 ENCOUNTER — APPOINTMENT (OUTPATIENT)
Dept: OBGYN | Facility: CLINIC | Age: 28
End: 2023-05-01
Payer: COMMERCIAL

## 2023-05-01 VITALS
BODY MASS INDEX: 29.92 KG/M2 | SYSTOLIC BLOOD PRESSURE: 132 MMHG | WEIGHT: 202 LBS | DIASTOLIC BLOOD PRESSURE: 100 MMHG | HEIGHT: 69 IN

## 2023-05-01 DIAGNOSIS — O30.049 TWIN PREGNANCY, DICHORIONIC/DIAMNIOTIC, UNSPECIFIED TRIMESTER: ICD-10-CM

## 2023-05-01 PROCEDURE — 0502F SUBSEQUENT PRENATAL CARE: CPT

## 2023-05-01 PROCEDURE — 76817 TRANSVAGINAL US OBSTETRIC: CPT

## 2023-05-03 LAB
ABO + RH PNL BLD: NORMAL
ALBUMIN SERPL ELPH-MCNC: 4.5 G/DL
ALP BLD-CCNC: 61 U/L
ALT SERPL-CCNC: 14 U/L
ANION GAP SERPL CALC-SCNC: 11 MMOL/L
AST SERPL-CCNC: 13 U/L
BASOPHILS # BLD AUTO: 0.04 K/UL
BASOPHILS NFR BLD AUTO: 0.5 %
BILIRUB SERPL-MCNC: 0.4 MG/DL
BLD GP AB SCN SERPL QL: NORMAL
BUN SERPL-MCNC: 6 MG/DL
CALCIUM SERPL-MCNC: 9.9 MG/DL
CHLORIDE SERPL-SCNC: 98 MMOL/L
CO2 SERPL-SCNC: 25 MMOL/L
CREAT SERPL-MCNC: 0.6 MG/DL
EGFR: 124 ML/MIN/1.73M2
EOSINOPHIL # BLD AUTO: 0.04 K/UL
EOSINOPHIL NFR BLD AUTO: 0.5 %
GLUCOSE 1H P 50 G GLC PO SERPL-MCNC: 149 MG/DL
GLUCOSE SERPL-MCNC: 142 MG/DL
HBV SURFACE AG SERPL QL IA: NONREACTIVE
HCT VFR BLD CALC: 44.9 %
HGB BLD-MCNC: 14.4 G/DL
HIV1+2 AB SPEC QL IA.RAPID: NONREACTIVE
IMM GRANULOCYTES NFR BLD AUTO: 0.4 %
LEAD BLD-MCNC: <1 UG/DL
LYMPHOCYTES # BLD AUTO: 1.61 K/UL
LYMPHOCYTES NFR BLD AUTO: 20.3 %
MAN DIFF?: NORMAL
MCHC RBC-ENTMCNC: 27.5 PG
MCHC RBC-ENTMCNC: 32.1 G/DL
MCV RBC AUTO: 85.7 FL
MEV IGG FLD QL IA: >300 AU/ML
MEV IGG+IGM SER-IMP: POSITIVE
MONOCYTES # BLD AUTO: 0.51 K/UL
MONOCYTES NFR BLD AUTO: 6.4 %
NEUTROPHILS # BLD AUTO: 5.69 K/UL
NEUTROPHILS NFR BLD AUTO: 71.9 %
PLATELET # BLD AUTO: 234 K/UL
POTASSIUM SERPL-SCNC: 3.8 MMOL/L
PROT SERPL-MCNC: 7.4 G/DL
RBC # BLD: 5.24 M/UL
RBC # FLD: 13.8 %
RUBV IGG FLD-ACNC: 9.4 INDEX
RUBV IGG SER-IMP: POSITIVE
SODIUM SERPL-SCNC: 134 MMOL/L
T PALLIDUM AB SER QL IA: NEGATIVE
VZV AB TITR SER: POSITIVE
VZV IGG SER IF-ACNC: 1688 INDEX
WBC # FLD AUTO: 7.92 K/UL

## 2023-05-03 RX ORDER — DESOGESTREL AND ETHINYL ESTRADIOL 0.15-0.03
0.15-3 KIT ORAL DAILY
Qty: 1 | Refills: 12 | Status: COMPLETED | COMMUNITY
Start: 2021-08-18 | End: 2023-05-03

## 2023-05-08 ENCOUNTER — NON-APPOINTMENT (OUTPATIENT)
Age: 28
End: 2023-05-08

## 2023-05-08 ENCOUNTER — APPOINTMENT (OUTPATIENT)
Dept: OBGYN | Facility: CLINIC | Age: 28
End: 2023-05-08
Payer: COMMERCIAL

## 2023-05-08 VITALS
SYSTOLIC BLOOD PRESSURE: 143 MMHG | WEIGHT: 198 LBS | HEIGHT: 69 IN | DIASTOLIC BLOOD PRESSURE: 106 MMHG | BODY MASS INDEX: 29.33 KG/M2

## 2023-05-08 DIAGNOSIS — Z3A.08 8 WEEKS GESTATION OF PREGNANCY: ICD-10-CM

## 2023-05-08 LAB
BILIRUB UR QL STRIP: NORMAL
GLUCOSE UR-MCNC: NORMAL
HCG UR QL: 0.2 EU/DL
HGB UR QL STRIP.AUTO: NORMAL
KETONES UR-MCNC: NORMAL
LEUKOCYTE ESTERASE UR QL STRIP: NORMAL
NITRITE UR QL STRIP: NORMAL
PH UR STRIP: 7.5
PROT UR STRIP-MCNC: NORMAL
SP GR UR STRIP: 1.01

## 2023-05-08 PROCEDURE — 0502F SUBSEQUENT PRENATAL CARE: CPT

## 2023-05-15 ENCOUNTER — APPOINTMENT (OUTPATIENT)
Dept: OBGYN | Facility: CLINIC | Age: 28
End: 2023-05-15
Payer: COMMERCIAL

## 2023-05-15 VITALS
HEIGHT: 69 IN | DIASTOLIC BLOOD PRESSURE: 103 MMHG | BODY MASS INDEX: 29.33 KG/M2 | SYSTOLIC BLOOD PRESSURE: 143 MMHG | WEIGHT: 198 LBS

## 2023-05-15 PROCEDURE — 36415 COLL VENOUS BLD VENIPUNCTURE: CPT

## 2023-05-15 PROCEDURE — 0502F SUBSEQUENT PRENATAL CARE: CPT

## 2023-05-17 LAB
BACTERIA UR CULT: NORMAL
BILIRUB UR QL STRIP: NORMAL
GLUCOSE UR-MCNC: NORMAL
HCG UR QL: 0.2 EU/DL
HGB UR QL STRIP.AUTO: NORMAL
KETONES UR-MCNC: NORMAL
LEUKOCYTE ESTERASE UR QL STRIP: NORMAL
NITRITE UR QL STRIP: NORMAL
PH UR STRIP: 7
PROT UR STRIP-MCNC: NORMAL
SP GR UR STRIP: 1.01

## 2023-05-22 ENCOUNTER — APPOINTMENT (OUTPATIENT)
Dept: OBGYN | Facility: CLINIC | Age: 28
End: 2023-05-22
Payer: COMMERCIAL

## 2023-05-22 VITALS
DIASTOLIC BLOOD PRESSURE: 100 MMHG | HEIGHT: 69 IN | SYSTOLIC BLOOD PRESSURE: 143 MMHG | BODY MASS INDEX: 28.73 KG/M2 | WEIGHT: 194 LBS

## 2023-05-22 LAB
BILIRUB UR QL STRIP: NORMAL
CHROMOSOME13 INTERPRETATION: NORMAL
CHROMOSOME13 TEST RESULT: NORMAL
CHROMOSOME18 INTERPRETATION: NORMAL
CHROMOSOME18 TEST RESULT: NORMAL
CHROMOSOME21 INTERPRETATION: NORMAL
CHROMOSOME21 TEST RESULT: NORMAL
FETAL FRACTION: NORMAL
GLUCOSE UR-MCNC: NORMAL
HCG UR QL: 0.2 EU/DL
HGB UR QL STRIP.AUTO: NORMAL
KETONES UR-MCNC: NORMAL
LEUKOCYTE ESTERASE UR QL STRIP: NORMAL
NITRITE UR QL STRIP: NORMAL
PERFORMANCE AND LIMITATIONS: NORMAL
PH UR STRIP: 6.5
PROT UR STRIP-MCNC: NORMAL
SEX CHROMOSOME INTERPRETATION: NORMAL
SEX CHROMOSOME TEST RESULT: NORMAL
SP GR UR STRIP: 1.01
VERIFI PRENATAL TEST: NOT DETECTED

## 2023-05-22 PROCEDURE — 0502F SUBSEQUENT PRENATAL CARE: CPT

## 2023-05-30 ENCOUNTER — APPOINTMENT (OUTPATIENT)
Dept: ANTEPARTUM | Facility: CLINIC | Age: 28
End: 2023-05-30
Payer: COMMERCIAL

## 2023-05-30 ENCOUNTER — ASOB RESULT (OUTPATIENT)
Age: 28
End: 2023-05-30

## 2023-05-30 VITALS
WEIGHT: 194 LBS | SYSTOLIC BLOOD PRESSURE: 140 MMHG | HEART RATE: 71 BPM | BODY MASS INDEX: 28.73 KG/M2 | HEIGHT: 69 IN | DIASTOLIC BLOOD PRESSURE: 85 MMHG

## 2023-05-30 PROCEDURE — 76813 OB US NUCHAL MEAS 1 GEST: CPT

## 2023-06-05 ENCOUNTER — APPOINTMENT (OUTPATIENT)
Dept: OBGYN | Facility: CLINIC | Age: 28
End: 2023-06-05
Payer: COMMERCIAL

## 2023-06-05 VITALS
SYSTOLIC BLOOD PRESSURE: 145 MMHG | BODY MASS INDEX: 28.44 KG/M2 | DIASTOLIC BLOOD PRESSURE: 106 MMHG | HEIGHT: 69 IN | WEIGHT: 192 LBS

## 2023-06-05 PROCEDURE — 0502F SUBSEQUENT PRENATAL CARE: CPT

## 2023-06-06 LAB
BILIRUB UR QL STRIP: NORMAL
GLUCOSE UR-MCNC: NORMAL
HCG UR QL: 0.2 EU/DL
HGB UR QL STRIP.AUTO: NORMAL
KETONES UR-MCNC: NORMAL
LEUKOCYTE ESTERASE UR QL STRIP: NORMAL
NITRITE UR QL STRIP: NORMAL
PH UR STRIP: 6.5
PROT UR STRIP-MCNC: NORMAL
SP GR UR STRIP: 1

## 2023-06-15 ENCOUNTER — NON-APPOINTMENT (OUTPATIENT)
Age: 28
End: 2023-06-15

## 2023-06-19 ENCOUNTER — APPOINTMENT (OUTPATIENT)
Dept: OBGYN | Facility: CLINIC | Age: 28
End: 2023-06-19
Payer: COMMERCIAL

## 2023-06-19 VITALS
WEIGHT: 193 LBS | DIASTOLIC BLOOD PRESSURE: 102 MMHG | SYSTOLIC BLOOD PRESSURE: 136 MMHG | HEIGHT: 69 IN | BODY MASS INDEX: 28.58 KG/M2

## 2023-06-19 PROCEDURE — 0502F SUBSEQUENT PRENATAL CARE: CPT

## 2023-06-20 LAB
BILIRUB UR QL STRIP: NORMAL
GLUCOSE UR-MCNC: NORMAL
HCG UR QL: 0.2 EU/DL
HGB UR QL STRIP.AUTO: NORMAL
KETONES UR-MCNC: NORMAL
LEUKOCYTE ESTERASE UR QL STRIP: NORMAL
NITRITE UR QL STRIP: NORMAL
PH UR STRIP: 7
PROT UR STRIP-MCNC: NORMAL
SP GR UR STRIP: 1.01

## 2023-06-23 LAB
AFP MOM: 1.8
AFP VALUE: 51 NG/ML
ALPHA FETOPROTEIN SERUM COMMENT: NORMAL
ALPHA FETOPROTEIN SERUM INTERPRETATION: NORMAL
ALPHA FETOPROTEIN SERUM RESULTS: NORMAL
ALPHA FETOPROTEIN SERUM TEST RESULTS: NORMAL
GESTATIONAL AGE BASED ON: NORMAL
GESTATIONAL AGE ON COLLECTION DATE: 15.7 WEEKS
INSULIN DEP DIABETES: NO
MATERNAL AGE AT EDD AFP: 30.7 YR
MULTIPLE GESTATION: NO
OSBR RISK 1 IN: 1278
RACE: NORMAL
WEIGHT AFP: 193 LBS

## 2023-07-03 ENCOUNTER — APPOINTMENT (OUTPATIENT)
Dept: OBGYN | Facility: CLINIC | Age: 28
End: 2023-07-03
Payer: COMMERCIAL

## 2023-07-03 ENCOUNTER — NON-APPOINTMENT (OUTPATIENT)
Age: 28
End: 2023-07-03

## 2023-07-03 VITALS
BODY MASS INDEX: 28.29 KG/M2 | SYSTOLIC BLOOD PRESSURE: 127 MMHG | HEIGHT: 69 IN | WEIGHT: 191 LBS | DIASTOLIC BLOOD PRESSURE: 90 MMHG

## 2023-07-03 PROCEDURE — 0502F SUBSEQUENT PRENATAL CARE: CPT

## 2023-07-04 LAB
BILIRUB UR QL STRIP: NORMAL
CLARITY UR: CLEAR
COLLECTION METHOD: NORMAL
GLUCOSE UR-MCNC: NORMAL
HCG UR QL: 0.2 EU/DL
HGB UR QL STRIP.AUTO: NORMAL
KETONES UR-MCNC: NORMAL
LEUKOCYTE ESTERASE UR QL STRIP: NORMAL
NITRITE UR QL STRIP: NORMAL
PH UR STRIP: 7
PROT UR STRIP-MCNC: NORMAL
SP GR UR STRIP: 1.01

## 2023-07-18 ENCOUNTER — APPOINTMENT (OUTPATIENT)
Dept: ANTEPARTUM | Facility: CLINIC | Age: 28
End: 2023-07-18
Payer: COMMERCIAL

## 2023-07-18 ENCOUNTER — ASOB RESULT (OUTPATIENT)
Age: 28
End: 2023-07-18

## 2023-07-18 VITALS
SYSTOLIC BLOOD PRESSURE: 124 MMHG | BODY MASS INDEX: 28.73 KG/M2 | HEIGHT: 69 IN | HEART RATE: 71 BPM | WEIGHT: 194 LBS | DIASTOLIC BLOOD PRESSURE: 81 MMHG

## 2023-07-18 PROCEDURE — 76805 OB US >/= 14 WKS SNGL FETUS: CPT

## 2023-07-31 ENCOUNTER — APPOINTMENT (OUTPATIENT)
Dept: OBGYN | Facility: CLINIC | Age: 28
End: 2023-07-31
Payer: COMMERCIAL

## 2023-07-31 VITALS
HEIGHT: 69 IN | SYSTOLIC BLOOD PRESSURE: 142 MMHG | WEIGHT: 192 LBS | DIASTOLIC BLOOD PRESSURE: 98 MMHG | BODY MASS INDEX: 28.44 KG/M2

## 2023-07-31 PROCEDURE — 0502F SUBSEQUENT PRENATAL CARE: CPT

## 2023-08-29 ENCOUNTER — APPOINTMENT (OUTPATIENT)
Dept: OBGYN | Facility: CLINIC | Age: 28
End: 2023-08-29
Payer: COMMERCIAL

## 2023-08-29 VITALS
HEIGHT: 69 IN | WEIGHT: 195 LBS | BODY MASS INDEX: 28.88 KG/M2 | DIASTOLIC BLOOD PRESSURE: 106 MMHG | SYSTOLIC BLOOD PRESSURE: 147 MMHG

## 2023-08-29 LAB
BILIRUB UR QL STRIP: NORMAL
GLUCOSE UR-MCNC: NORMAL
HCG UR QL: 0.2 EU/DL
HGB UR QL STRIP.AUTO: NORMAL
KETONES UR-MCNC: NORMAL
LEUKOCYTE ESTERASE UR QL STRIP: NORMAL
NITRITE UR QL STRIP: NORMAL
PH UR STRIP: 6.5
PROT UR STRIP-MCNC: NORMAL
SP GR UR STRIP: 1.01

## 2023-08-29 PROCEDURE — 81002 URINALYSIS NONAUTO W/O SCOPE: CPT

## 2023-08-29 PROCEDURE — 0502F SUBSEQUENT PRENATAL CARE: CPT

## 2023-08-30 LAB
BASOPHILS # BLD AUTO: 0.06 K/UL
BASOPHILS NFR BLD AUTO: 0.6 %
EOSINOPHIL # BLD AUTO: 0.08 K/UL
EOSINOPHIL NFR BLD AUTO: 0.9 %
GLUCOSE 1H P 50 G GLC PO SERPL-MCNC: 95 MG/DL
HCT VFR BLD CALC: 42.8 %
HGB BLD-MCNC: 13.3 G/DL
IMM GRANULOCYTES NFR BLD AUTO: 0.5 %
LYMPHOCYTES # BLD AUTO: 2.02 K/UL
LYMPHOCYTES NFR BLD AUTO: 21.9 %
MAN DIFF?: NORMAL
MCHC RBC-ENTMCNC: 27.5 PG
MCHC RBC-ENTMCNC: 31.1 G/DL
MCV RBC AUTO: 88.4 FL
MONOCYTES # BLD AUTO: 0.8 K/UL
MONOCYTES NFR BLD AUTO: 8.7 %
NEUTROPHILS # BLD AUTO: 6.23 K/UL
NEUTROPHILS NFR BLD AUTO: 67.4 %
PLATELET # BLD AUTO: 172 K/UL
RBC # BLD: 4.84 M/UL
RBC # FLD: 14.7 %
WBC # FLD AUTO: 9.24 K/UL

## 2023-09-18 ENCOUNTER — APPOINTMENT (OUTPATIENT)
Dept: OBGYN | Facility: CLINIC | Age: 28
End: 2023-09-18
Payer: COMMERCIAL

## 2023-09-18 VITALS
BODY MASS INDEX: 29.47 KG/M2 | DIASTOLIC BLOOD PRESSURE: 94 MMHG | HEIGHT: 69 IN | SYSTOLIC BLOOD PRESSURE: 156 MMHG | WEIGHT: 199 LBS

## 2023-09-18 DIAGNOSIS — K64.4 RESIDUAL HEMORRHOIDAL SKIN TAGS: ICD-10-CM

## 2023-09-18 PROCEDURE — 0502F SUBSEQUENT PRENATAL CARE: CPT

## 2023-09-28 ENCOUNTER — NON-APPOINTMENT (OUTPATIENT)
Age: 28
End: 2023-09-28

## 2023-10-02 ENCOUNTER — APPOINTMENT (OUTPATIENT)
Dept: OBGYN | Facility: CLINIC | Age: 28
End: 2023-10-02
Payer: COMMERCIAL

## 2023-10-02 VITALS
WEIGHT: 202 LBS | SYSTOLIC BLOOD PRESSURE: 138 MMHG | HEIGHT: 69 IN | DIASTOLIC BLOOD PRESSURE: 98 MMHG | BODY MASS INDEX: 29.92 KG/M2

## 2023-10-02 PROCEDURE — 0502F SUBSEQUENT PRENATAL CARE: CPT

## 2023-10-02 PROCEDURE — 96372 THER/PROPH/DIAG INJ SC/IM: CPT

## 2023-10-12 ENCOUNTER — NON-APPOINTMENT (OUTPATIENT)
Age: 28
End: 2023-10-12

## 2023-10-16 ENCOUNTER — APPOINTMENT (OUTPATIENT)
Dept: OBGYN | Facility: CLINIC | Age: 28
End: 2023-10-16
Payer: COMMERCIAL

## 2023-10-16 ENCOUNTER — NON-APPOINTMENT (OUTPATIENT)
Age: 28
End: 2023-10-16

## 2023-10-16 ENCOUNTER — LABORATORY RESULT (OUTPATIENT)
Age: 28
End: 2023-10-16

## 2023-10-16 ENCOUNTER — ASOB RESULT (OUTPATIENT)
Age: 28
End: 2023-10-16

## 2023-10-16 ENCOUNTER — APPOINTMENT (OUTPATIENT)
Dept: ANTEPARTUM | Facility: CLINIC | Age: 28
End: 2023-10-16
Payer: COMMERCIAL

## 2023-10-16 VITALS
HEIGHT: 69 IN | BODY MASS INDEX: 30.21 KG/M2 | DIASTOLIC BLOOD PRESSURE: 106 MMHG | SYSTOLIC BLOOD PRESSURE: 131 MMHG | WEIGHT: 204 LBS

## 2023-10-16 VITALS
DIASTOLIC BLOOD PRESSURE: 100 MMHG | WEIGHT: 204 LBS | SYSTOLIC BLOOD PRESSURE: 148 MMHG | HEART RATE: 84 BPM | HEIGHT: 69 IN | BODY MASS INDEX: 30.21 KG/M2

## 2023-10-16 PROCEDURE — 0502F SUBSEQUENT PRENATAL CARE: CPT

## 2023-10-16 PROCEDURE — 76818 FETAL BIOPHYS PROFILE W/NST: CPT | Mod: 59

## 2023-10-16 PROCEDURE — 76816 OB US FOLLOW-UP PER FETUS: CPT

## 2023-10-16 PROCEDURE — 76820 UMBILICAL ARTERY ECHO: CPT | Mod: 59

## 2023-10-17 LAB — HIV1+2 AB SPEC QL IA.RAPID: NONREACTIVE

## 2023-10-24 ENCOUNTER — APPOINTMENT (OUTPATIENT)
Dept: ANTEPARTUM | Facility: CLINIC | Age: 28
End: 2023-10-24
Payer: COMMERCIAL

## 2023-10-24 ENCOUNTER — ASOB RESULT (OUTPATIENT)
Age: 28
End: 2023-10-24

## 2023-10-24 ENCOUNTER — APPOINTMENT (OUTPATIENT)
Dept: MATERNAL FETAL MEDICINE | Facility: CLINIC | Age: 28
End: 2023-10-24
Payer: COMMERCIAL

## 2023-10-24 VITALS — SYSTOLIC BLOOD PRESSURE: 120 MMHG | DIASTOLIC BLOOD PRESSURE: 80 MMHG

## 2023-10-24 VITALS — HEIGHT: 69 IN | DIASTOLIC BLOOD PRESSURE: 100 MMHG | SYSTOLIC BLOOD PRESSURE: 140 MMHG | HEART RATE: 84 BPM

## 2023-10-24 DIAGNOSIS — I10 ESSENTIAL (PRIMARY) HYPERTENSION: ICD-10-CM

## 2023-10-24 DIAGNOSIS — Z87.59 PERSONAL HISTORY OF OTHER COMPLICATIONS OF PREGNANCY, CHILDBIRTH AND THE PUERPERIUM: ICD-10-CM

## 2023-10-24 DIAGNOSIS — Z3A.25 25 WEEKS GESTATION OF PREGNANCY: ICD-10-CM

## 2023-10-24 LAB
ALBUMIN SERPL ELPH-MCNC: 3.9 G/DL
ALP BLD-CCNC: 73 U/L
ALT SERPL-CCNC: 14 U/L
ANION GAP SERPL CALC-SCNC: 13 MMOL/L
AST SERPL-CCNC: 15 U/L
BASOPHILS # BLD AUTO: 0.05 K/UL
BASOPHILS NFR BLD AUTO: 0.5 %
BILIRUB SERPL-MCNC: 0.4 MG/DL
BUN SERPL-MCNC: 8 MG/DL
CALCIUM SERPL-MCNC: 9.6 MG/DL
CHLORIDE SERPL-SCNC: 103 MMOL/L
CO2 SERPL-SCNC: 20 MMOL/L
CREAT SERPL-MCNC: 0.6 MG/DL
EGFR: 124 ML/MIN/1.73M2
EOSINOPHIL # BLD AUTO: 0.06 K/UL
EOSINOPHIL NFR BLD AUTO: 0.6 %
GLUCOSE SERPL-MCNC: 80 MG/DL
HCT VFR BLD CALC: 42.1 %
HGB BLD-MCNC: 13.6 G/DL
IMM GRANULOCYTES NFR BLD AUTO: 0.5 %
LYMPHOCYTES # BLD AUTO: 2.2 K/UL
LYMPHOCYTES NFR BLD AUTO: 23.5 %
MAN DIFF?: NORMAL
MCHC RBC-ENTMCNC: 28.3 PG
MCHC RBC-ENTMCNC: 32.3 G/DL
MCV RBC AUTO: 87.5 FL
MONOCYTES # BLD AUTO: 0.96 K/UL
MONOCYTES NFR BLD AUTO: 10.3 %
NEUTROPHILS # BLD AUTO: 6.04 K/UL
NEUTROPHILS NFR BLD AUTO: 64.6 %
PLATELET # BLD AUTO: 185 K/UL
POTASSIUM SERPL-SCNC: 4.3 MMOL/L
PROT SERPL-MCNC: 6.6 G/DL
RBC # BLD: 4.81 M/UL
RBC # FLD: 14.5 %
SODIUM SERPL-SCNC: 136 MMOL/L
WBC # FLD AUTO: 9.36 K/UL

## 2023-10-24 PROCEDURE — 76820 UMBILICAL ARTERY ECHO: CPT

## 2023-10-24 PROCEDURE — 99213 OFFICE O/P EST LOW 20 MIN: CPT | Mod: 25

## 2023-10-24 PROCEDURE — ZZZZZ: CPT

## 2023-10-24 PROCEDURE — 76818 FETAL BIOPHYS PROFILE W/NST: CPT

## 2023-10-30 ENCOUNTER — APPOINTMENT (OUTPATIENT)
Dept: ANTEPARTUM | Facility: CLINIC | Age: 28
End: 2023-10-30
Payer: COMMERCIAL

## 2023-10-30 ENCOUNTER — ASOB RESULT (OUTPATIENT)
Age: 28
End: 2023-10-30

## 2023-10-30 ENCOUNTER — APPOINTMENT (OUTPATIENT)
Dept: MATERNAL FETAL MEDICINE | Facility: CLINIC | Age: 28
End: 2023-10-30
Payer: COMMERCIAL

## 2023-10-30 ENCOUNTER — APPOINTMENT (OUTPATIENT)
Dept: OBGYN | Facility: CLINIC | Age: 28
End: 2023-10-30
Payer: COMMERCIAL

## 2023-10-30 VITALS
WEIGHT: 211 LBS | HEIGHT: 69 IN | BODY MASS INDEX: 31.25 KG/M2 | SYSTOLIC BLOOD PRESSURE: 151 MMHG | DIASTOLIC BLOOD PRESSURE: 100 MMHG

## 2023-10-30 VITALS
BODY MASS INDEX: 31.7 KG/M2 | DIASTOLIC BLOOD PRESSURE: 90 MMHG | HEART RATE: 80 BPM | SYSTOLIC BLOOD PRESSURE: 130 MMHG | WEIGHT: 214 LBS | HEIGHT: 69 IN

## 2023-10-30 LAB
BILIRUB UR QL STRIP: NORMAL
BILIRUBIN URINE: NEGATIVE
BLOOD URINE: NORMAL
GLUCOSE QUALITATIVE U: NEGATIVE
GLUCOSE UR-MCNC: NORMAL
HCG UR QL: 0.2 EU/DL
HGB UR QL STRIP.AUTO: NORMAL
KETONES UR-MCNC: NORMAL
KETONES URINE: NEGATIVE
LEUKOCYTE ESTERASE URINE: NORMAL
NITRITE UR QL STRIP: NORMAL
NITRITE URINE: NEGATIVE
PH UR STRIP: 6.5
PH URINE: 6.5
PROT UR STRIP-MCNC: NORMAL
PROTEIN URINE: NEGATIVE
SP GR UR STRIP: 1.01
SPECIFIC GRAVITY URINE: 1.01
UROBILINOGEN URINE: 0.2

## 2023-10-30 PROCEDURE — ZZZZZ: CPT

## 2023-10-30 PROCEDURE — 0502F SUBSEQUENT PRENATAL CARE: CPT

## 2023-10-30 PROCEDURE — 76818 FETAL BIOPHYS PROFILE W/NST: CPT

## 2023-10-30 PROCEDURE — 99443: CPT | Mod: 25

## 2023-10-30 PROCEDURE — 76820 UMBILICAL ARTERY ECHO: CPT

## 2023-10-31 LAB
ALBUMIN SERPL ELPH-MCNC: 3.8 G/DL
ALP BLD-CCNC: 78 U/L
ALT SERPL-CCNC: 15 U/L
ANION GAP SERPL CALC-SCNC: 12 MMOL/L
AST SERPL-CCNC: 24 U/L
BASOPHILS # BLD AUTO: 0.06 K/UL
BASOPHILS NFR BLD AUTO: 0.6 %
BILIRUB SERPL-MCNC: 0.3 MG/DL
BUN SERPL-MCNC: 10 MG/DL
CALCIUM SERPL-MCNC: 9.9 MG/DL
CHLORIDE SERPL-SCNC: 100 MMOL/L
CO2 SERPL-SCNC: 21 MMOL/L
CREAT SERPL-MCNC: 0.7 MG/DL
EGFR: 119 ML/MIN/1.73M2
EOSINOPHIL # BLD AUTO: 0.1 K/UL
EOSINOPHIL NFR BLD AUTO: 0.9 %
GLUCOSE SERPL-MCNC: 61 MG/DL
HCT VFR BLD CALC: 41.5 %
HGB BLD-MCNC: 13.6 G/DL
IMM GRANULOCYTES NFR BLD AUTO: 0.7 %
LYMPHOCYTES # BLD AUTO: 2.52 K/UL
LYMPHOCYTES NFR BLD AUTO: 23.9 %
MAN DIFF?: NORMAL
MCHC RBC-ENTMCNC: 28.9 PG
MCHC RBC-ENTMCNC: 32.8 G/DL
MCV RBC AUTO: 88.1 FL
MONOCYTES # BLD AUTO: 1.21 K/UL
MONOCYTES NFR BLD AUTO: 11.5 %
NEUTROPHILS # BLD AUTO: 6.57 K/UL
NEUTROPHILS NFR BLD AUTO: 62.4 %
PLATELET # BLD AUTO: 169 K/UL
POTASSIUM SERPL-SCNC: 4.7 MMOL/L
PROT SERPL-MCNC: 6.5 G/DL
RBC # BLD: 4.71 M/UL
RBC # FLD: 14.5 %
SODIUM SERPL-SCNC: 133 MMOL/L
WBC # FLD AUTO: 10.53 K/UL

## 2023-11-06 ENCOUNTER — APPOINTMENT (OUTPATIENT)
Dept: ANTEPARTUM | Facility: CLINIC | Age: 28
End: 2023-11-06
Payer: COMMERCIAL

## 2023-11-06 ENCOUNTER — ASOB RESULT (OUTPATIENT)
Age: 28
End: 2023-11-06

## 2023-11-06 ENCOUNTER — APPOINTMENT (OUTPATIENT)
Dept: MATERNAL FETAL MEDICINE | Facility: CLINIC | Age: 28
End: 2023-11-06
Payer: COMMERCIAL

## 2023-11-06 VITALS — DIASTOLIC BLOOD PRESSURE: 90 MMHG | SYSTOLIC BLOOD PRESSURE: 148 MMHG

## 2023-11-06 VITALS — HEART RATE: 84 BPM | HEIGHT: 69 IN | BODY MASS INDEX: 31.55 KG/M2 | WEIGHT: 213 LBS

## 2023-11-06 VITALS — DIASTOLIC BLOOD PRESSURE: 102 MMHG | SYSTOLIC BLOOD PRESSURE: 152 MMHG

## 2023-11-06 LAB
BILIRUBIN URINE: NORMAL
BLOOD URINE: NORMAL
GLUCOSE QUALITATIVE U: NORMAL
KETONES URINE: NORMAL
LEUKOCYTE ESTERASE URINE: NORMAL
NITRITE URINE: NORMAL
PH URINE: 6.5
PROTEIN URINE: NORMAL
SPECIFIC GRAVITY URINE: 1.01
UROBILINOGEN URINE: 0.2

## 2023-11-06 PROCEDURE — 76816 OB US FOLLOW-UP PER FETUS: CPT

## 2023-11-06 PROCEDURE — 76818 FETAL BIOPHYS PROFILE W/NST: CPT | Mod: 59

## 2023-11-06 PROCEDURE — ZZZZZ: CPT

## 2023-11-06 PROCEDURE — 76820 UMBILICAL ARTERY ECHO: CPT | Mod: 59

## 2023-11-06 PROCEDURE — 99214 OFFICE O/P EST MOD 30 MIN: CPT | Mod: 25

## 2023-11-13 ENCOUNTER — INPATIENT (INPATIENT)
Facility: HOSPITAL | Age: 28
LOS: 2 days | Discharge: HOME | End: 2023-11-16
Attending: OBSTETRICS & GYNECOLOGY | Admitting: OBSTETRICS & GYNECOLOGY
Payer: COMMERCIAL

## 2023-11-13 ENCOUNTER — NON-APPOINTMENT (OUTPATIENT)
Age: 28
End: 2023-11-13

## 2023-11-13 ENCOUNTER — ASOB RESULT (OUTPATIENT)
Age: 28
End: 2023-11-13

## 2023-11-13 ENCOUNTER — APPOINTMENT (OUTPATIENT)
Dept: MATERNAL FETAL MEDICINE | Facility: CLINIC | Age: 28
End: 2023-11-13
Payer: COMMERCIAL

## 2023-11-13 ENCOUNTER — APPOINTMENT (OUTPATIENT)
Dept: ANTEPARTUM | Facility: CLINIC | Age: 28
End: 2023-11-13
Payer: COMMERCIAL

## 2023-11-13 ENCOUNTER — APPOINTMENT (OUTPATIENT)
Dept: OBGYN | Facility: CLINIC | Age: 28
End: 2023-11-13
Payer: COMMERCIAL

## 2023-11-13 VITALS
SYSTOLIC BLOOD PRESSURE: 160 MMHG | WEIGHT: 214 LBS | HEIGHT: 69 IN | BODY MASS INDEX: 31.7 KG/M2 | DIASTOLIC BLOOD PRESSURE: 100 MMHG

## 2023-11-13 VITALS
WEIGHT: 214 LBS | HEIGHT: 69 IN | SYSTOLIC BLOOD PRESSURE: 182 MMHG | BODY MASS INDEX: 31.7 KG/M2 | DIASTOLIC BLOOD PRESSURE: 112 MMHG

## 2023-11-13 VITALS — SYSTOLIC BLOOD PRESSURE: 180 MMHG | HEART RATE: 85 BPM | DIASTOLIC BLOOD PRESSURE: 104 MMHG

## 2023-11-13 DIAGNOSIS — O61.0 FAILED MEDICAL INDUCTION OF LABOR: ICD-10-CM

## 2023-11-13 DIAGNOSIS — Z3A.00 WEEKS OF GESTATION OF PREGNANCY NOT SPECIFIED: ICD-10-CM

## 2023-11-13 DIAGNOSIS — O26.899 OTHER SPECIFIED PREGNANCY RELATED CONDITIONS, UNSPECIFIED TRIMESTER: ICD-10-CM

## 2023-11-13 DIAGNOSIS — K40.20 BILATERAL INGUINAL HERNIA, WITHOUT OBSTRUCTION OR GANGRENE, NOT SPECIFIED AS RECURRENT: Chronic | ICD-10-CM

## 2023-11-13 LAB
ALBUMIN SERPL ELPH-MCNC: 4 G/DL — SIGNIFICANT CHANGE UP (ref 3.5–5.2)
ALBUMIN SERPL ELPH-MCNC: 4 G/DL — SIGNIFICANT CHANGE UP (ref 3.5–5.2)
ALP SERPL-CCNC: 103 U/L — SIGNIFICANT CHANGE UP (ref 30–115)
ALP SERPL-CCNC: 103 U/L — SIGNIFICANT CHANGE UP (ref 30–115)
ALT FLD-CCNC: 18 U/L — SIGNIFICANT CHANGE UP (ref 0–41)
ALT FLD-CCNC: 18 U/L — SIGNIFICANT CHANGE UP (ref 0–41)
ANION GAP SERPL CALC-SCNC: 15 MMOL/L — HIGH (ref 7–14)
ANION GAP SERPL CALC-SCNC: 15 MMOL/L — HIGH (ref 7–14)
AST SERPL-CCNC: 18 U/L — SIGNIFICANT CHANGE UP (ref 0–41)
AST SERPL-CCNC: 18 U/L — SIGNIFICANT CHANGE UP (ref 0–41)
BASOPHILS # BLD AUTO: 0.04 K/UL — SIGNIFICANT CHANGE UP (ref 0–0.2)
BASOPHILS # BLD AUTO: 0.04 K/UL — SIGNIFICANT CHANGE UP (ref 0–0.2)
BASOPHILS NFR BLD AUTO: 0.4 % — SIGNIFICANT CHANGE UP (ref 0–1)
BASOPHILS NFR BLD AUTO: 0.4 % — SIGNIFICANT CHANGE UP (ref 0–1)
BILIRUB SERPL-MCNC: 0.3 MG/DL — SIGNIFICANT CHANGE UP (ref 0.2–1.2)
BILIRUB SERPL-MCNC: 0.3 MG/DL — SIGNIFICANT CHANGE UP (ref 0.2–1.2)
BILIRUB UR QL STRIP: NORMAL
BUN SERPL-MCNC: 9 MG/DL — LOW (ref 10–20)
BUN SERPL-MCNC: 9 MG/DL — LOW (ref 10–20)
CALCIUM SERPL-MCNC: 9.8 MG/DL — SIGNIFICANT CHANGE UP (ref 8.4–10.5)
CALCIUM SERPL-MCNC: 9.8 MG/DL — SIGNIFICANT CHANGE UP (ref 8.4–10.5)
CHLORIDE SERPL-SCNC: 100 MMOL/L — SIGNIFICANT CHANGE UP (ref 98–110)
CHLORIDE SERPL-SCNC: 100 MMOL/L — SIGNIFICANT CHANGE UP (ref 98–110)
CO2 SERPL-SCNC: 22 MMOL/L — SIGNIFICANT CHANGE UP (ref 17–32)
CO2 SERPL-SCNC: 22 MMOL/L — SIGNIFICANT CHANGE UP (ref 17–32)
CREAT SERPL-MCNC: 0.7 MG/DL — SIGNIFICANT CHANGE UP (ref 0.7–1.5)
CREAT SERPL-MCNC: 0.7 MG/DL — SIGNIFICANT CHANGE UP (ref 0.7–1.5)
EGFR: 119 ML/MIN/1.73M2 — SIGNIFICANT CHANGE UP
EGFR: 119 ML/MIN/1.73M2 — SIGNIFICANT CHANGE UP
EOSINOPHIL # BLD AUTO: 0.03 K/UL — SIGNIFICANT CHANGE UP (ref 0–0.7)
EOSINOPHIL # BLD AUTO: 0.03 K/UL — SIGNIFICANT CHANGE UP (ref 0–0.7)
EOSINOPHIL NFR BLD AUTO: 0.3 % — SIGNIFICANT CHANGE UP (ref 0–8)
EOSINOPHIL NFR BLD AUTO: 0.3 % — SIGNIFICANT CHANGE UP (ref 0–8)
GLUCOSE SERPL-MCNC: 104 MG/DL — HIGH (ref 70–99)
GLUCOSE SERPL-MCNC: 104 MG/DL — HIGH (ref 70–99)
GLUCOSE UR-MCNC: NORMAL
HCG UR QL: 0.2 EU/DL
HCT VFR BLD CALC: 44.1 % — SIGNIFICANT CHANGE UP (ref 37–47)
HCT VFR BLD CALC: 44.1 % — SIGNIFICANT CHANGE UP (ref 37–47)
HGB BLD-MCNC: 14.5 G/DL — SIGNIFICANT CHANGE UP (ref 12–16)
HGB BLD-MCNC: 14.5 G/DL — SIGNIFICANT CHANGE UP (ref 12–16)
HGB UR QL STRIP.AUTO: NORMAL
IMM GRANULOCYTES NFR BLD AUTO: 0.6 % — HIGH (ref 0.1–0.3)
IMM GRANULOCYTES NFR BLD AUTO: 0.6 % — HIGH (ref 0.1–0.3)
KETONES UR-MCNC: NORMAL
LDH SERPL L TO P-CCNC: 214 — SIGNIFICANT CHANGE UP (ref 50–242)
LDH SERPL L TO P-CCNC: 214 — SIGNIFICANT CHANGE UP (ref 50–242)
LEUKOCYTE ESTERASE UR QL STRIP: NORMAL
LYMPHOCYTES # BLD AUTO: 1.89 K/UL — SIGNIFICANT CHANGE UP (ref 1.2–3.4)
LYMPHOCYTES # BLD AUTO: 1.89 K/UL — SIGNIFICANT CHANGE UP (ref 1.2–3.4)
LYMPHOCYTES # BLD AUTO: 21.2 % — SIGNIFICANT CHANGE UP (ref 20.5–51.1)
LYMPHOCYTES # BLD AUTO: 21.2 % — SIGNIFICANT CHANGE UP (ref 20.5–51.1)
MCHC RBC-ENTMCNC: 28.2 PG — SIGNIFICANT CHANGE UP (ref 27–31)
MCHC RBC-ENTMCNC: 28.2 PG — SIGNIFICANT CHANGE UP (ref 27–31)
MCHC RBC-ENTMCNC: 32.9 G/DL — SIGNIFICANT CHANGE UP (ref 32–37)
MCHC RBC-ENTMCNC: 32.9 G/DL — SIGNIFICANT CHANGE UP (ref 32–37)
MCV RBC AUTO: 85.6 FL — SIGNIFICANT CHANGE UP (ref 81–99)
MCV RBC AUTO: 85.6 FL — SIGNIFICANT CHANGE UP (ref 81–99)
MONOCYTES # BLD AUTO: 0.67 K/UL — HIGH (ref 0.1–0.6)
MONOCYTES # BLD AUTO: 0.67 K/UL — HIGH (ref 0.1–0.6)
MONOCYTES NFR BLD AUTO: 7.5 % — SIGNIFICANT CHANGE UP (ref 1.7–9.3)
MONOCYTES NFR BLD AUTO: 7.5 % — SIGNIFICANT CHANGE UP (ref 1.7–9.3)
NEUTROPHILS # BLD AUTO: 6.22 K/UL — SIGNIFICANT CHANGE UP (ref 1.4–6.5)
NEUTROPHILS # BLD AUTO: 6.22 K/UL — SIGNIFICANT CHANGE UP (ref 1.4–6.5)
NEUTROPHILS NFR BLD AUTO: 70 % — SIGNIFICANT CHANGE UP (ref 42.2–75.2)
NEUTROPHILS NFR BLD AUTO: 70 % — SIGNIFICANT CHANGE UP (ref 42.2–75.2)
NITRITE UR QL STRIP: NORMAL
NRBC # BLD: 0 /100 WBCS — SIGNIFICANT CHANGE UP (ref 0–0)
NRBC # BLD: 0 /100 WBCS — SIGNIFICANT CHANGE UP (ref 0–0)
PH UR STRIP: 7
PLATELET # BLD AUTO: 160 K/UL — SIGNIFICANT CHANGE UP (ref 130–400)
PLATELET # BLD AUTO: 160 K/UL — SIGNIFICANT CHANGE UP (ref 130–400)
PMV BLD: 11.4 FL — HIGH (ref 7.4–10.4)
PMV BLD: 11.4 FL — HIGH (ref 7.4–10.4)
POTASSIUM SERPL-MCNC: 3.7 MMOL/L — SIGNIFICANT CHANGE UP (ref 3.5–5)
POTASSIUM SERPL-MCNC: 3.7 MMOL/L — SIGNIFICANT CHANGE UP (ref 3.5–5)
POTASSIUM SERPL-SCNC: 3.7 MMOL/L — SIGNIFICANT CHANGE UP (ref 3.5–5)
POTASSIUM SERPL-SCNC: 3.7 MMOL/L — SIGNIFICANT CHANGE UP (ref 3.5–5)
PROT SERPL-MCNC: 7.2 G/DL — SIGNIFICANT CHANGE UP (ref 6–8)
PROT SERPL-MCNC: 7.2 G/DL — SIGNIFICANT CHANGE UP (ref 6–8)
PROT UR STRIP-MCNC: NORMAL
RBC # BLD: 5.15 M/UL — SIGNIFICANT CHANGE UP (ref 4.2–5.4)
RBC # BLD: 5.15 M/UL — SIGNIFICANT CHANGE UP (ref 4.2–5.4)
RBC # FLD: 14.6 % — HIGH (ref 11.5–14.5)
RBC # FLD: 14.6 % — HIGH (ref 11.5–14.5)
SODIUM SERPL-SCNC: 137 MMOL/L — SIGNIFICANT CHANGE UP (ref 135–146)
SODIUM SERPL-SCNC: 137 MMOL/L — SIGNIFICANT CHANGE UP (ref 135–146)
SP GR UR STRIP: 1.01
URATE SERPL-MCNC: 5.2 MG/DL — SIGNIFICANT CHANGE UP (ref 2.5–7)
URATE SERPL-MCNC: 5.2 MG/DL — SIGNIFICANT CHANGE UP (ref 2.5–7)
WBC # BLD: 8.9 K/UL — SIGNIFICANT CHANGE UP (ref 4.8–10.8)
WBC # BLD: 8.9 K/UL — SIGNIFICANT CHANGE UP (ref 4.8–10.8)
WBC # FLD AUTO: 8.9 K/UL — SIGNIFICANT CHANGE UP (ref 4.8–10.8)
WBC # FLD AUTO: 8.9 K/UL — SIGNIFICANT CHANGE UP (ref 4.8–10.8)

## 2023-11-13 PROCEDURE — 76820 UMBILICAL ARTERY ECHO: CPT

## 2023-11-13 PROCEDURE — 88307 TISSUE EXAM BY PATHOLOGIST: CPT

## 2023-11-13 PROCEDURE — 83735 ASSAY OF MAGNESIUM: CPT

## 2023-11-13 PROCEDURE — ZZZZZ: CPT

## 2023-11-13 PROCEDURE — 85461 HEMOGLOBIN FETAL: CPT

## 2023-11-13 PROCEDURE — 90384 RH IG FULL-DOSE IM: CPT

## 2023-11-13 PROCEDURE — 80053 COMPREHEN METABOLIC PANEL: CPT

## 2023-11-13 PROCEDURE — 85025 COMPLETE CBC W/AUTO DIFF WBC: CPT

## 2023-11-13 PROCEDURE — 86077 PHYS BLOOD BANK SERV XMATCH: CPT

## 2023-11-13 PROCEDURE — 83615 LACTATE (LD) (LDH) ENZYME: CPT

## 2023-11-13 PROCEDURE — 0502F SUBSEQUENT PRENATAL CARE: CPT

## 2023-11-13 PROCEDURE — 59050 FETAL MONITOR W/REPORT: CPT

## 2023-11-13 PROCEDURE — 36415 COLL VENOUS BLD VENIPUNCTURE: CPT

## 2023-11-13 PROCEDURE — 99214 OFFICE O/P EST MOD 30 MIN: CPT | Mod: 25

## 2023-11-13 PROCEDURE — 76818 FETAL BIOPHYS PROFILE W/NST: CPT

## 2023-11-13 PROCEDURE — 86592 SYPHILIS TEST NON-TREP QUAL: CPT

## 2023-11-13 PROCEDURE — 84550 ASSAY OF BLOOD/URIC ACID: CPT

## 2023-11-13 RX ORDER — LABETALOL HCL 100 MG
20 TABLET ORAL ONCE
Refills: 0 | Status: COMPLETED | OUTPATIENT
Start: 2023-11-13 | End: 2023-11-13

## 2023-11-13 RX ORDER — OXYTOCIN 10 UNIT/ML
333.33 VIAL (ML) INJECTION
Qty: 20 | Refills: 0 | Status: COMPLETED | OUTPATIENT
Start: 2023-11-13 | End: 2023-11-13

## 2023-11-13 RX ORDER — MAGNESIUM SULFATE 500 MG/ML
2 VIAL (ML) INJECTION
Qty: 40 | Refills: 0 | Status: DISCONTINUED | OUTPATIENT
Start: 2023-11-13 | End: 2023-11-15

## 2023-11-13 RX ORDER — LABETALOL HCL 100 MG
40 TABLET ORAL ONCE
Refills: 0 | Status: DISCONTINUED | OUTPATIENT
Start: 2023-11-13 | End: 2023-11-16

## 2023-11-13 RX ORDER — NIFEDIPINE 30 MG
10 TABLET, EXTENDED RELEASE 24 HR ORAL ONCE
Refills: 0 | Status: COMPLETED | OUTPATIENT
Start: 2023-11-13 | End: 2023-11-13

## 2023-11-13 RX ORDER — AMPICILLIN TRIHYDRATE 250 MG
2 CAPSULE ORAL ONCE
Refills: 0 | Status: COMPLETED | OUTPATIENT
Start: 2023-11-13 | End: 2023-11-13

## 2023-11-13 RX ORDER — AMPICILLIN TRIHYDRATE 250 MG
1 CAPSULE ORAL EVERY 4 HOURS
Refills: 0 | Status: DISCONTINUED | OUTPATIENT
Start: 2023-11-13 | End: 2023-11-14

## 2023-11-13 RX ORDER — SODIUM CHLORIDE 9 MG/ML
1000 INJECTION, SOLUTION INTRAVENOUS
Refills: 0 | Status: DISCONTINUED | OUTPATIENT
Start: 2023-11-13 | End: 2023-11-14

## 2023-11-13 RX ORDER — MAGNESIUM SULFATE 500 MG/ML
4 VIAL (ML) INJECTION ONCE
Refills: 0 | Status: DISCONTINUED | OUTPATIENT
Start: 2023-11-13 | End: 2023-11-15

## 2023-11-13 RX ORDER — ACETAMINOPHEN 500 MG
650 TABLET ORAL ONCE
Refills: 0 | Status: COMPLETED | OUTPATIENT
Start: 2023-11-13 | End: 2023-11-13

## 2023-11-13 RX ORDER — MAGNESIUM SULFATE 500 MG/ML
4 VIAL (ML) INJECTION ONCE
Refills: 0 | Status: COMPLETED | OUTPATIENT
Start: 2023-11-13 | End: 2023-11-13

## 2023-11-13 RX ADMIN — Medication 10 MILLIGRAM(S): at 18:15

## 2023-11-13 RX ADMIN — Medication 50 GM/HR: at 20:21

## 2023-11-13 RX ADMIN — Medication 200 GRAM(S): at 21:10

## 2023-11-13 RX ADMIN — Medication 20 MILLIGRAM(S): at 19:17

## 2023-11-13 RX ADMIN — Medication 300 GRAM(S): at 19:53

## 2023-11-13 NOTE — OB PROVIDER H&P - NSHPPHYSICALEXAM_GEN_ALL_CORE
Vital Signs Last 24 Hrs  T(C): 36.6 (13 Nov 2023 18:09), Max: 36.6 (13 Nov 2023 18:09)  T(F): 97.9 (13 Nov 2023 18:09), Max: 97.9 (13 Nov 2023 18:09)  HR: 83 (13 Nov 2023 18:42) (83 - 85)  BP: 158/94 (13 Nov 2023 18:42) (158/94 - 180/104)  RR: 18 (13 Nov 2023 18:09) (18 - 18)    General: AAOx3, NAD  Abdomen: Soft, nontender, gravid  EFM: 135/mod/+accels  toco: none  BSS:

## 2023-11-13 NOTE — OB PROVIDER TRIAGE NOTE - NSHPPHYSICALEXAM_GEN_ALL_CORE
Vital Signs Last 24 Hrs  T(C): 36.6 (13 Nov 2023 18:09), Max: 36.6 (13 Nov 2023 18:09)  T(F): 97.9 (13 Nov 2023 18:09), Max: 97.9 (13 Nov 2023 18:09)  HR: 85 (13 Nov 2023 18:12) (85 - 85)  BP: 173/90 (13 Nov 2023 18:12) (173/90 - 180/104)  RR: 18 (13 Nov 2023 18:09) (18 - 18)    General: AAOx3, NAD  Abdomen: Soft, nontender, gravid  EFM: 135/mod/+accels  toco: none  BSS:

## 2023-11-13 NOTE — OB PROVIDER TRIAGE NOTE - NS_OBGYNHISTORY_OBGYN_ALL_OB_FT
OB Hx:  1 sAB, no d&c    Gyn Hx:  H/o PCOS, previously treated with OCPs.  Denies h/o fibroids, STIs, or abnormal pap smears.

## 2023-11-13 NOTE — OB PROVIDER H&P - HISTORY OF PRESENT ILLNESS
29 y/o  at 36w5d KOSTA 23 by LMP c/w first trimester sonogram presents for blood pressure monitoring. H/o cHTN, not on any medication. Sent over from the office for bp monitoring after bp in office was 182/112. Seen by MFM later this afternoon and bp was 160/100. Has a h/o white coat htn, but notes bp has never been this elevated. Denies HA, vision changes, chest pain, SOB, RUQ pain or LE edema. Taking ASA 81mg daily. Outpatient CMP WNL. O neg (rhogam 10/2), GBS unknown.     Pregnancy c/b:   #IUGR - sono @35w5d EFW 2236 7%ile, HC 4%ile, AC 20%ile. Umbilical artery doppler flow is > upper limits of normal, >97th%ile by all parameters.     P1 - IOL at 37w for GDMA2 and cHTN.  27 y/o  at 36w5d KOSTA 23 by LMP c/w first trimester sonogram presents for blood pressure monitoring. H/o cHTN, not on any medication. Sent over from the office for bp monitoring after bp in office was 182/112. Seen by MFM later this afternoon and bp was 160/100. Has a h/o white coat htn, but notes bp has never been this elevated. Denies HA, vision changes, chest pain, SOB, RUQ pain or LE edema. Taking ASA 81mg daily. Outpatient CMP WNL. O neg (rhogam 10/2), GBS unknown.     Pregnancy c/b:   #IUGR - sono @35w5d EFW 2236 7%ile, HC 4%ile, AC 20%ile. Umbilical artery doppler flow is > upper limits of normal, >97th%ile by all parameters.     P1 - IOL at 37w for GDMA2 and cHTN.

## 2023-11-13 NOTE — OB PROVIDER H&P - NSWIC_OBGYN_ALL_OB
[facility] 1 Month Well Child Check    ASSESSMENT & PLAN  Pardeep Miranda is a 5 wk.o. male who has normal growth and normal development.    Diagnoses and all orders for this visit:    Encounter for routine child health examination w/o abnormal findings  -     Maternal Health Risk Assessment (28135) - EPDS    Gastroesophageal reflux disease with esophagitis without hemorrhage - will initiate treatment for GERD with PPI Omeprazole 1 mg/kg/day. Asked mom to call back if no significant improvement or if projectile vomiting is worsening since stopping antibiotic and starting PPI over the next 1-2 weeks. She agrees with this plan.   -     omeprazole (PRILOSEC) 2 mg/mL SusR suspension; Take 2 mL (4 mg total) by mouth daily before breakfast for 30 doses.  Dispense: 60 mL; Refill: 0    Ear infection resolved. Diaper rash significantly improved with antibiotic, just has mild erythema on buttock. Complete full course of amoxicillin.     Return to clinic at 2 months or sooner as needed    IMMUNIZATIONS  No immunizations due today.    Immunization History   Administered Date(s) Administered     Hep B, Peds or Adolescent 2020       ANTICIPATORY GUIDANCE  I have reviewed age appropriate anticipatory guidance.  Social:  Mom's Postpartum Checkup, Postpartum Fatigue/Depression, Return to Work and Role Changes  Parenting:  Sleep Habits and Respond to Cry/Colic  Nutrition:  Needs No Solid Food, Non-nutrient Sucking Needs and Mixing/Storing Formula  Play and Communication: Reading with Baby, Talk or Sing to Baby, Music and Mobiles  Health:  After Hours and Emergency Care, Upper Respiratory Infections, Taking Temperature, Fevers, Rashes, Diaper Care and Hygiene  Safety:  Safe Sleep, Car Seat  and Falls    HEALTH HISTORY  Do you have any concerns that you'd like to discuss today?:  (1) Mom reports that Pardeep has been spitting up a lot when he is feeding. He is really fussy in between feedings. Spits up when on his back. Arching  back. Seems very uncomfortable. Older brother had bad reflux and mom thinks he does too. Is taking similac advanced formula. Growth is on track. Spit is NBNB. Is projectile vomiting with about 5 feedings daily, this increased since starting amoxicillin 1 week ago.     (2) Pardeep went to [facility] on 10/31 for evaluation of fever, diaper rash, and an ear infection. Sepsis work up completed. Discharged home from ED diagnosed with rectal strep, left ear infection. Rx for Amoxicillin 30 mg/kg/day x10 days. Has had increase in reflux symptoms with starting antibiotic, otherwise tolerating well. Diaper rash improved significantly within 48 hours of starting antibiotic.       Roomed by: CAMERON TOA    Accompanied by Mother        Do you have any significant health concerns in your family history?: No  Family History   Problem Relation Age of Onset     Depression Maternal Grandmother               Obstructive Sleep Apnea Maternal Grandmother               Restless legs syndrome Maternal Grandmother               Depression Maternal Grandfather               ADD / ADHD Half Brother               Depression Half Brother               Autism spectrum disorder Half Brother               Child Abuse Half Brother      Asthma Mother               Mental illness Mother               Allergies Mother      Allergies Father      Asthma Father      Child Abuse Father      Allergies Paternal Grandmother      Asthma Paternal Grandmother      Mental illness Paternal Grandmother      Cancer Paternal Grandmother      Hypertension Paternal Grandfather      Hyperlipidemia Paternal Grandfather      Diabetes Paternal Grandfather      Allergies Maternal Uncle      Asthma Maternal Uncle      Mental illness Maternal Uncle      Allergies Paternal Aunt      Mental illness Paternal Aunt      Has a lack of transportation kept you from medical appointments?: No    Who lives in your home?:  Same as below  Social History     Social History  Narrative    Lives with mother, father, and older half-brother (Hernandez). Dad is a . Mom is a . Parents are .      Do you have any concerns about losing your housing?: No  Is your housing safe and comfortable?: Yes    Kennan  Depression Scale (EPDS) Risk Assessment: Completed      Feeding/Nutrition:  What does your child eat?: Formula: Similac advanced  2 oz every 2 hours  Do you give your child vitamins?: yes  Have you been worried that you don't have enough food?: No    Sleep:  How many times does your child wake in the night?: every 2 hours    In what position does your baby sleep:  back  Where does your baby sleep?:  bassinet    Elimination:  Do you have any concerns about your child's bowels or bladder (peeing, pooping,  constipation?):  No    TB Risk Assessment:  Has your child had any of the following?:  no known risk of TB    VISION/HEARING  Do you have any concerns about your child's hearing?  No  Do you have any concerns about your child's vision?  No    DEVELOPMENT  Do you have any concerns about your child's development?  No  Screening tool used, reviewed with parent or guardian: No screening tool used  Milestones (by observation/ exam/ report) 75-90% ile  PERSONAL/ SOCIAL/COGNITIVE:    Regards face    Calms when picked up or spoken to  LANGUAGE:    Vocalizes    Responds to sound  GROSS MOTOR:    Holds chin up when prone    Kicks / equal movements  FINE MOTOR/ ADAPTIVE:    Eyes follow caregiver    Opens fingers slightly when at rest     SCREENING RESULTS:   Hearing Screen:   Hearing Screening Results - Right Ear: Pass   Hearing Screening Results - Left Ear: Pass     CCHD Screen:   Right upper extremity -  Oxygen Saturation in Blood Preductal by Pulse Oximetry: 79 %   Lower extremity -  Oxygen Saturation in Blood Postductal by Pulse Oximetry: 79 %   CCHD Interpretation - No data recorded     Transcutaneous Bilirubin:   Transcutaneous Bili: 7  "(2020  7:00 AM)     Metabolic Screen:   No data recorded     Screening Results      metabolic       Hearing         Patient Active Problem List   Diagnosis     Term , current hospitalization     Hypoglycemia,      Need for observation and evaluation of  for sepsis       MEASUREMENTS    Length: 20.25\" (51.4 cm) (4 %, Z= -1.72, Source: WHO (Boys, 0-2 years))  Weight: 8 lb 14 oz (4.026 kg) (21 %, Z= -0.81, Source: WHO (Boys, 0-2 years))  Birth Weight Change: 28%  OFC: 36 cm (14.17\") (13 %, Z= -1.12, Source: WHO (Boys, 0-2 years))    Birth History     Birth     Length: 19.09\" (48.5 cm)     Weight: 6 lb 15.1 oz (3.15 kg)     HC 34 cm (13.39\")     Apgar     One: 2.0     Five: 6.0     Ten: 8.0     Delivery Method: Primary C/S, Low Transverse     Gestation Age: 37 2/7 wks     Briefly to NICU for exam and labs for hypoglycemia       PHYSICAL EXAM  Nursing note and vitals reviewed.  Constitutional: He appears well-developed and well-nourished.   HEENT: Head: Normocephalic. Anterior fontanelle is flat.    Right Ear: Tympanic membrane, external ear and canal normal.    Left Ear: Tympanic membrane, external ear and canal normal.    Nose: Nose normal.    Mouth/Throat: Mucous membranes are moist. Oropharynx is clear.    Eyes: Conjunctivae and lids are normal. Pupils are equal, round, and reactive to light. Red reflex is present bilaterally.  Neck: Neck supple. No tenderness is present.   Cardiovascular: Normal rate and regular rhythm. No murmur heard.  Pulses: Femoral pulses are 2+ bilaterally.   Pulmonary/Chest: Effort normal and breath sounds normal. There is normal air entry.   Abdominal: Soft. Bowel sounds are normal. There is no hepatosplenomegaly. No umbilical or inguinal hernia.    Genitourinary: Testes normal and penis normal.   Musculoskeletal: Normal range of motion. Normal tone and strength. No abnormalities are seen. Spine without abnormality. Hips are stable.   Neurological: He is " alert. He has normal reflexes.   Skin: mild erythema on buttock.       JAYANT Washington  Certified Pediatric Nurse Practitioner  [facility]  [phone]                 No

## 2023-11-13 NOTE — OB PROVIDER H&P - NSLOWPPHRISK_OBGYN_A_OB
No previous uterine incision/Joshi Pregnancy/Less than or equal to 4 previous vaginal births/No known bleeding disorder/No history of postpartum hemorrhage/No other PPH risks indicated

## 2023-11-13 NOTE — OB PROVIDER TRIAGE NOTE - HISTORY OF PRESENT ILLNESS
27 y/o  at 36w5d KOSTA 23 by LMP c/w first trimester sonogram presents for blood pressure monitoring. H/o cHTN, not on any medication. Sent over from the office for bp monitoring after bp in office was 182/112. Seen by MFM later this afternoon and bp was 160/100. Has a h/o white coat htn, but notes bp has never been this elevated. Denies HA, vision changes, chest pain, SOB, RUQ pain or LE edema. Taking ASA 81mg daily. Outpatient CMP WNL. O neg (rhogam 10/2), GBS unknown.     Pregnancy c/b:   #IUGR - sono @35w5d EFW 2236 7%ile, HC 4%ile, AC 20%ile. Umbilical artery doppler flow is > upper limits of normal, >97th%ile by all parameters.     P1 - IOL at 37w for GDMA2 and cHTN.  29 y/o  at 36w5d KOSTA 23 by LMP c/w first trimester sonogram presents for blood pressure monitoring. H/o cHTN, not on any medication. Sent over from the office for bp monitoring after bp in office was 182/112. Seen by MFM later this afternoon and bp was 160/100. Has a h/o white coat htn, but notes bp has never been this elevated. Denies HA, vision changes, chest pain, SOB, RUQ pain or LE edema. Taking ASA 81mg daily. Outpatient CMP WNL. O neg (rhogam 10/2), GBS unknown.     Pregnancy c/b:   #IUGR - sono @35w5d EFW 2236 7%ile, HC 4%ile, AC 20%ile. Umbilical artery doppler flow is > upper limits of normal, >97th%ile by all parameters.     P1 - IOL at 37w for GDMA2 and cHTN.

## 2023-11-13 NOTE — OB PROVIDER H&P - ASSESSMENT
27 y/o  at 36w5d, GBS unknown, h/o cHTN, s/p procardia 10mg IR, s/p labetalol 20mg IVP, IOL for preeclampsia.     -admit to labor and delivery  -pain management prn   -continous efm & toco  -admission labs  -IV access   -IV hydration   -diet: clear liquid diet   -start magnesium    Discussed with Dr. Gallagher and Dr. Griffiths.    29 y/o  at 36w5d, GBS unknown, h/o cHTN, s/p procardia 10mg IR, s/p labetalol 20mg IVP, IOL for preeclampsia.     -admit to labor and delivery  -pain management prn   -continous efm & toco  -admission labs  -IV access   -IV hydration   -diet: clear liquid diet   -start magnesium    Discussed with Dr. Gallagher and Dr. Griffiths.    29 y/o  at 36w5d, GBS unknown, h/o cHTN, s/p procardia 10mg IR, s/p labetalol 20mg IVP, IOL for superimposed preeclampsia.     -admit to labor and delivery  -pain management prn   -continous efm & toco  -admission labs  -IV access   -IV hydration   -diet: clear liquid diet   -start magnesium    Discussed with Dr. Gallagher and Dr. Griffiths.    27 y/o  at 36w5d, GBS unknown, h/o cHTN, s/p procardia 10mg IR, s/p labetalol 20mg IVP, IOL for superimposed preeclampsia.     -admit to labor and delivery  -pain management prn   -continous efm & toco  -admission labs  -IV access   -IV hydration   -diet: clear liquid diet   -start magnesium    Discussed with Dr. Gallagher and Dr. Griffiths.

## 2023-11-13 NOTE — OB PROVIDER TRIAGE NOTE - NSHPLABSRESULTS_GEN_ALL_CORE
GCT 95  verifi neg    5/1/23  HepBsAg NR  rubella, measles, varicella immune  RPR neg  HIV NR      O neg, anti neg s/p rhogam 10/2    10/16/23  HIV NR    Sonos  36w5d - umbilical artery dopplers 95%ile, 97%ile  35w5d - EFW 2236 7%ile, HC 4%ile, AC 20%ile, vtx, posterior placenta mvp 4.63cm  19w6d - anatomy scan WNL

## 2023-11-13 NOTE — OB PROVIDER H&P - ATTENDING COMMENTS
Pt with cHTN with superimposed pre-eclampsia for IOL. Fetus is FGR with intermittantly abnormal dopplers and normal NST/BPP today. Patient for admission for repeated severely elevated BP's for IOL and magnesium for infection prophylaxis. Patient aware and agreeable with plan.

## 2023-11-14 ENCOUNTER — RESULT REVIEW (OUTPATIENT)
Age: 28
End: 2023-11-14

## 2023-11-14 LAB
ALBUMIN SERPL ELPH-MCNC: 2.8 G/DL — LOW (ref 3.5–5.2)
ALBUMIN SERPL ELPH-MCNC: 2.8 G/DL — LOW (ref 3.5–5.2)
ALBUMIN SERPL ELPH-MCNC: 3.3 G/DL — LOW (ref 3.5–5.2)
ALBUMIN SERPL ELPH-MCNC: 3.3 G/DL — LOW (ref 3.5–5.2)
ALBUMIN SERPL ELPH-MCNC: 3.7 G/DL — SIGNIFICANT CHANGE UP (ref 3.5–5.2)
ALP SERPL-CCNC: 76 U/L — SIGNIFICANT CHANGE UP (ref 30–115)
ALP SERPL-CCNC: 76 U/L — SIGNIFICANT CHANGE UP (ref 30–115)
ALP SERPL-CCNC: 80 U/L — SIGNIFICANT CHANGE UP (ref 30–115)
ALP SERPL-CCNC: 80 U/L — SIGNIFICANT CHANGE UP (ref 30–115)
ALP SERPL-CCNC: 91 U/L — SIGNIFICANT CHANGE UP (ref 30–115)
ALT FLD-CCNC: 15 U/L — SIGNIFICANT CHANGE UP (ref 0–41)
ALT FLD-CCNC: 16 U/L — SIGNIFICANT CHANGE UP (ref 0–41)
ALT FLD-CCNC: 16 U/L — SIGNIFICANT CHANGE UP (ref 0–41)
ALT FLD-CCNC: 17 U/L — SIGNIFICANT CHANGE UP (ref 0–41)
ALT FLD-CCNC: 17 U/L — SIGNIFICANT CHANGE UP (ref 0–41)
ANION GAP SERPL CALC-SCNC: 10 MMOL/L — SIGNIFICANT CHANGE UP (ref 7–14)
ANION GAP SERPL CALC-SCNC: 10 MMOL/L — SIGNIFICANT CHANGE UP (ref 7–14)
ANION GAP SERPL CALC-SCNC: 12 MMOL/L — SIGNIFICANT CHANGE UP (ref 7–14)
ANION GAP SERPL CALC-SCNC: 12 MMOL/L — SIGNIFICANT CHANGE UP (ref 7–14)
ANION GAP SERPL CALC-SCNC: 14 MMOL/L — SIGNIFICANT CHANGE UP (ref 7–14)
ANION GAP SERPL CALC-SCNC: 14 MMOL/L — SIGNIFICANT CHANGE UP (ref 7–14)
ANION GAP SERPL CALC-SCNC: 6 MMOL/L — LOW (ref 7–14)
ANION GAP SERPL CALC-SCNC: 6 MMOL/L — LOW (ref 7–14)
AST SERPL-CCNC: 17 U/L — SIGNIFICANT CHANGE UP (ref 0–41)
AST SERPL-CCNC: 18 U/L — SIGNIFICANT CHANGE UP (ref 0–41)
BASOPHILS # BLD AUTO: 0.02 K/UL — SIGNIFICANT CHANGE UP (ref 0–0.2)
BASOPHILS # BLD AUTO: 0.02 K/UL — SIGNIFICANT CHANGE UP (ref 0–0.2)
BASOPHILS # BLD AUTO: 0.03 K/UL — SIGNIFICANT CHANGE UP (ref 0–0.2)
BASOPHILS # BLD AUTO: 0.04 K/UL — SIGNIFICANT CHANGE UP (ref 0–0.2)
BASOPHILS # BLD AUTO: 0.04 K/UL — SIGNIFICANT CHANGE UP (ref 0–0.2)
BASOPHILS NFR BLD AUTO: 0.2 % — SIGNIFICANT CHANGE UP (ref 0–1)
BASOPHILS NFR BLD AUTO: 0.3 % — SIGNIFICANT CHANGE UP (ref 0–1)
BASOPHILS NFR BLD AUTO: 0.3 % — SIGNIFICANT CHANGE UP (ref 0–1)
BASOPHILS NFR BLD AUTO: 0.4 % — SIGNIFICANT CHANGE UP (ref 0–1)
BASOPHILS NFR BLD AUTO: 0.4 % — SIGNIFICANT CHANGE UP (ref 0–1)
BILIRUB SERPL-MCNC: 0.2 MG/DL — SIGNIFICANT CHANGE UP (ref 0.2–1.2)
BILIRUB SERPL-MCNC: 0.2 MG/DL — SIGNIFICANT CHANGE UP (ref 0.2–1.2)
BILIRUB SERPL-MCNC: 0.3 MG/DL — SIGNIFICANT CHANGE UP (ref 0.2–1.2)
BILIRUB SERPL-MCNC: 0.3 MG/DL — SIGNIFICANT CHANGE UP (ref 0.2–1.2)
BILIRUB SERPL-MCNC: 0.4 MG/DL — SIGNIFICANT CHANGE UP (ref 0.2–1.2)
BILIRUB SERPL-MCNC: 0.4 MG/DL — SIGNIFICANT CHANGE UP (ref 0.2–1.2)
BILIRUB SERPL-MCNC: 0.5 MG/DL — SIGNIFICANT CHANGE UP (ref 0.2–1.2)
BILIRUB SERPL-MCNC: 0.5 MG/DL — SIGNIFICANT CHANGE UP (ref 0.2–1.2)
BUN SERPL-MCNC: 7 MG/DL — LOW (ref 10–20)
BUN SERPL-MCNC: 9 MG/DL — LOW (ref 10–20)
BUN SERPL-MCNC: 9 MG/DL — LOW (ref 10–20)
CALCIUM SERPL-MCNC: 6.5 MG/DL — LOW (ref 8.4–10.5)
CALCIUM SERPL-MCNC: 6.5 MG/DL — LOW (ref 8.4–10.5)
CALCIUM SERPL-MCNC: 6.9 MG/DL — LOW (ref 8.4–10.4)
CALCIUM SERPL-MCNC: 6.9 MG/DL — LOW (ref 8.4–10.4)
CALCIUM SERPL-MCNC: 7.6 MG/DL — LOW (ref 8.4–10.5)
CALCIUM SERPL-MCNC: 7.6 MG/DL — LOW (ref 8.4–10.5)
CALCIUM SERPL-MCNC: 8.2 MG/DL — LOW (ref 8.4–10.5)
CALCIUM SERPL-MCNC: 8.2 MG/DL — LOW (ref 8.4–10.5)
CHLORIDE SERPL-SCNC: 100 MMOL/L — SIGNIFICANT CHANGE UP (ref 98–110)
CHLORIDE SERPL-SCNC: 100 MMOL/L — SIGNIFICANT CHANGE UP (ref 98–110)
CHLORIDE SERPL-SCNC: 102 MMOL/L — SIGNIFICANT CHANGE UP (ref 98–110)
CHLORIDE SERPL-SCNC: 102 MMOL/L — SIGNIFICANT CHANGE UP (ref 98–110)
CHLORIDE SERPL-SCNC: 98 MMOL/L — SIGNIFICANT CHANGE UP (ref 98–110)
CHLORIDE SERPL-SCNC: 98 MMOL/L — SIGNIFICANT CHANGE UP (ref 98–110)
CHLORIDE SERPL-SCNC: 99 MMOL/L — SIGNIFICANT CHANGE UP (ref 98–110)
CHLORIDE SERPL-SCNC: 99 MMOL/L — SIGNIFICANT CHANGE UP (ref 98–110)
CO2 SERPL-SCNC: 20 MMOL/L — SIGNIFICANT CHANGE UP (ref 17–32)
CO2 SERPL-SCNC: 20 MMOL/L — SIGNIFICANT CHANGE UP (ref 17–32)
CO2 SERPL-SCNC: 21 MMOL/L — SIGNIFICANT CHANGE UP (ref 17–32)
CO2 SERPL-SCNC: 21 MMOL/L — SIGNIFICANT CHANGE UP (ref 17–32)
CO2 SERPL-SCNC: 22 MMOL/L — SIGNIFICANT CHANGE UP (ref 17–32)
CO2 SERPL-SCNC: 22 MMOL/L — SIGNIFICANT CHANGE UP (ref 17–32)
CO2 SERPL-SCNC: 23 MMOL/L — SIGNIFICANT CHANGE UP (ref 17–32)
CO2 SERPL-SCNC: 23 MMOL/L — SIGNIFICANT CHANGE UP (ref 17–32)
CREAT ?TM UR-MCNC: 33 MG/DL — SIGNIFICANT CHANGE UP
CREAT ?TM UR-MCNC: 33 MG/DL — SIGNIFICANT CHANGE UP
CREAT SERPL-MCNC: 0.5 MG/DL — LOW (ref 0.7–1.5)
CREAT SERPL-MCNC: 0.5 MG/DL — LOW (ref 0.7–1.5)
CREAT SERPL-MCNC: 0.6 MG/DL — LOW (ref 0.7–1.5)
EGFR: 124 ML/MIN/1.73M2 — SIGNIFICANT CHANGE UP
EGFR: 129 ML/MIN/1.73M2 — SIGNIFICANT CHANGE UP
EGFR: 129 ML/MIN/1.73M2 — SIGNIFICANT CHANGE UP
EOSINOPHIL # BLD AUTO: 0.03 K/UL — SIGNIFICANT CHANGE UP (ref 0–0.7)
EOSINOPHIL # BLD AUTO: 0.03 K/UL — SIGNIFICANT CHANGE UP (ref 0–0.7)
EOSINOPHIL # BLD AUTO: 0.06 K/UL — SIGNIFICANT CHANGE UP (ref 0–0.7)
EOSINOPHIL # BLD AUTO: 0.07 K/UL — SIGNIFICANT CHANGE UP (ref 0–0.7)
EOSINOPHIL # BLD AUTO: 0.07 K/UL — SIGNIFICANT CHANGE UP (ref 0–0.7)
EOSINOPHIL NFR BLD AUTO: 0.2 % — SIGNIFICANT CHANGE UP (ref 0–8)
EOSINOPHIL NFR BLD AUTO: 0.2 % — SIGNIFICANT CHANGE UP (ref 0–8)
EOSINOPHIL NFR BLD AUTO: 0.6 % — SIGNIFICANT CHANGE UP (ref 0–8)
EOSINOPHIL NFR BLD AUTO: 0.7 % — SIGNIFICANT CHANGE UP (ref 0–8)
EOSINOPHIL NFR BLD AUTO: 0.7 % — SIGNIFICANT CHANGE UP (ref 0–8)
GLUCOSE SERPL-MCNC: 126 MG/DL — HIGH (ref 70–99)
GLUCOSE SERPL-MCNC: 126 MG/DL — HIGH (ref 70–99)
GLUCOSE SERPL-MCNC: 135 MG/DL — HIGH (ref 70–99)
GLUCOSE SERPL-MCNC: 135 MG/DL — HIGH (ref 70–99)
GLUCOSE SERPL-MCNC: 84 MG/DL — SIGNIFICANT CHANGE UP (ref 70–99)
GLUCOSE SERPL-MCNC: 84 MG/DL — SIGNIFICANT CHANGE UP (ref 70–99)
GLUCOSE SERPL-MCNC: 85 MG/DL — SIGNIFICANT CHANGE UP (ref 70–99)
GLUCOSE SERPL-MCNC: 85 MG/DL — SIGNIFICANT CHANGE UP (ref 70–99)
HCT VFR BLD CALC: 33.3 % — LOW (ref 37–47)
HCT VFR BLD CALC: 33.3 % — LOW (ref 37–47)
HCT VFR BLD CALC: 35.8 % — LOW (ref 37–47)
HCT VFR BLD CALC: 35.8 % — LOW (ref 37–47)
HCT VFR BLD CALC: 39.8 % — SIGNIFICANT CHANGE UP (ref 37–47)
HCT VFR BLD CALC: 39.8 % — SIGNIFICANT CHANGE UP (ref 37–47)
HCT VFR BLD CALC: 41.8 % — SIGNIFICANT CHANGE UP (ref 37–47)
HCT VFR BLD CALC: 41.8 % — SIGNIFICANT CHANGE UP (ref 37–47)
HGB BLD-MCNC: 11.2 G/DL — LOW (ref 12–16)
HGB BLD-MCNC: 11.2 G/DL — LOW (ref 12–16)
HGB BLD-MCNC: 12 G/DL — SIGNIFICANT CHANGE UP (ref 12–16)
HGB BLD-MCNC: 12 G/DL — SIGNIFICANT CHANGE UP (ref 12–16)
HGB BLD-MCNC: 13.5 G/DL — SIGNIFICANT CHANGE UP (ref 12–16)
HGB BLD-MCNC: 13.5 G/DL — SIGNIFICANT CHANGE UP (ref 12–16)
HGB BLD-MCNC: 13.9 G/DL — SIGNIFICANT CHANGE UP (ref 12–16)
HGB BLD-MCNC: 13.9 G/DL — SIGNIFICANT CHANGE UP (ref 12–16)
IMM GRANULOCYTES NFR BLD AUTO: 0.4 % — HIGH (ref 0.1–0.3)
IMM GRANULOCYTES NFR BLD AUTO: 0.5 % — HIGH (ref 0.1–0.3)
LDH SERPL L TO P-CCNC: 200 — SIGNIFICANT CHANGE UP (ref 50–242)
LDH SERPL L TO P-CCNC: 200 — SIGNIFICANT CHANGE UP (ref 50–242)
LDH SERPL L TO P-CCNC: 205 — SIGNIFICANT CHANGE UP (ref 50–242)
LDH SERPL L TO P-CCNC: 205 — SIGNIFICANT CHANGE UP (ref 50–242)
LDH SERPL L TO P-CCNC: 213 — SIGNIFICANT CHANGE UP (ref 50–242)
LDH SERPL L TO P-CCNC: 213 — SIGNIFICANT CHANGE UP (ref 50–242)
LDH SERPL L TO P-CCNC: 224 — SIGNIFICANT CHANGE UP (ref 50–242)
LDH SERPL L TO P-CCNC: 224 — SIGNIFICANT CHANGE UP (ref 50–242)
LYMPHOCYTES # BLD AUTO: 1.25 K/UL — SIGNIFICANT CHANGE UP (ref 1.2–3.4)
LYMPHOCYTES # BLD AUTO: 1.25 K/UL — SIGNIFICANT CHANGE UP (ref 1.2–3.4)
LYMPHOCYTES # BLD AUTO: 1.78 K/UL — SIGNIFICANT CHANGE UP (ref 1.2–3.4)
LYMPHOCYTES # BLD AUTO: 1.78 K/UL — SIGNIFICANT CHANGE UP (ref 1.2–3.4)
LYMPHOCYTES # BLD AUTO: 1.85 K/UL — SIGNIFICANT CHANGE UP (ref 1.2–3.4)
LYMPHOCYTES # BLD AUTO: 1.85 K/UL — SIGNIFICANT CHANGE UP (ref 1.2–3.4)
LYMPHOCYTES # BLD AUTO: 18.3 % — LOW (ref 20.5–51.1)
LYMPHOCYTES # BLD AUTO: 18.3 % — LOW (ref 20.5–51.1)
LYMPHOCYTES # BLD AUTO: 19.3 % — LOW (ref 20.5–51.1)
LYMPHOCYTES # BLD AUTO: 19.3 % — LOW (ref 20.5–51.1)
LYMPHOCYTES # BLD AUTO: 2.45 K/UL — SIGNIFICANT CHANGE UP (ref 1.2–3.4)
LYMPHOCYTES # BLD AUTO: 2.45 K/UL — SIGNIFICANT CHANGE UP (ref 1.2–3.4)
LYMPHOCYTES # BLD AUTO: 24 % — SIGNIFICANT CHANGE UP (ref 20.5–51.1)
LYMPHOCYTES # BLD AUTO: 24 % — SIGNIFICANT CHANGE UP (ref 20.5–51.1)
LYMPHOCYTES # BLD AUTO: 9.6 % — LOW (ref 20.5–51.1)
LYMPHOCYTES # BLD AUTO: 9.6 % — LOW (ref 20.5–51.1)
MAGNESIUM SERPL-MCNC: 4.6 MG/DL — CRITICAL HIGH (ref 1.8–2.4)
MAGNESIUM SERPL-MCNC: 4.6 MG/DL — CRITICAL HIGH (ref 1.8–2.4)
MAGNESIUM SERPL-MCNC: 5.1 MG/DL — CRITICAL HIGH (ref 1.8–2.4)
MAGNESIUM SERPL-MCNC: 5.1 MG/DL — CRITICAL HIGH (ref 1.8–2.4)
MAGNESIUM SERPL-MCNC: 5.5 MG/DL — CRITICAL HIGH (ref 1.8–2.4)
MAGNESIUM SERPL-MCNC: 5.5 MG/DL — CRITICAL HIGH (ref 1.8–2.4)
MAGNESIUM SERPL-MCNC: 6 MG/DL — CRITICAL HIGH (ref 1.8–2.4)
MAGNESIUM SERPL-MCNC: 6 MG/DL — CRITICAL HIGH (ref 1.8–2.4)
MCHC RBC-ENTMCNC: 28.5 PG — SIGNIFICANT CHANGE UP (ref 27–31)
MCHC RBC-ENTMCNC: 28.5 PG — SIGNIFICANT CHANGE UP (ref 27–31)
MCHC RBC-ENTMCNC: 28.6 PG — SIGNIFICANT CHANGE UP (ref 27–31)
MCHC RBC-ENTMCNC: 28.6 PG — SIGNIFICANT CHANGE UP (ref 27–31)
MCHC RBC-ENTMCNC: 28.8 PG — SIGNIFICANT CHANGE UP (ref 27–31)
MCHC RBC-ENTMCNC: 28.8 PG — SIGNIFICANT CHANGE UP (ref 27–31)
MCHC RBC-ENTMCNC: 29.2 PG — SIGNIFICANT CHANGE UP (ref 27–31)
MCHC RBC-ENTMCNC: 29.2 PG — SIGNIFICANT CHANGE UP (ref 27–31)
MCHC RBC-ENTMCNC: 33.3 G/DL — SIGNIFICANT CHANGE UP (ref 32–37)
MCHC RBC-ENTMCNC: 33.3 G/DL — SIGNIFICANT CHANGE UP (ref 32–37)
MCHC RBC-ENTMCNC: 33.5 G/DL — SIGNIFICANT CHANGE UP (ref 32–37)
MCHC RBC-ENTMCNC: 33.5 G/DL — SIGNIFICANT CHANGE UP (ref 32–37)
MCHC RBC-ENTMCNC: 33.6 G/DL — SIGNIFICANT CHANGE UP (ref 32–37)
MCHC RBC-ENTMCNC: 33.6 G/DL — SIGNIFICANT CHANGE UP (ref 32–37)
MCHC RBC-ENTMCNC: 33.9 G/DL — SIGNIFICANT CHANGE UP (ref 32–37)
MCHC RBC-ENTMCNC: 33.9 G/DL — SIGNIFICANT CHANGE UP (ref 32–37)
MCV RBC AUTO: 84.1 FL — SIGNIFICANT CHANGE UP (ref 81–99)
MCV RBC AUTO: 84.1 FL — SIGNIFICANT CHANGE UP (ref 81–99)
MCV RBC AUTO: 86 FL — SIGNIFICANT CHANGE UP (ref 81–99)
MCV RBC AUTO: 86 FL — SIGNIFICANT CHANGE UP (ref 81–99)
MCV RBC AUTO: 86.1 FL — SIGNIFICANT CHANGE UP (ref 81–99)
MCV RBC AUTO: 86.1 FL — SIGNIFICANT CHANGE UP (ref 81–99)
MCV RBC AUTO: 86.7 FL — SIGNIFICANT CHANGE UP (ref 81–99)
MCV RBC AUTO: 86.7 FL — SIGNIFICANT CHANGE UP (ref 81–99)
MONOCYTES # BLD AUTO: 0.86 K/UL — HIGH (ref 0.1–0.6)
MONOCYTES # BLD AUTO: 0.86 K/UL — HIGH (ref 0.1–0.6)
MONOCYTES # BLD AUTO: 0.87 K/UL — HIGH (ref 0.1–0.6)
MONOCYTES # BLD AUTO: 0.93 K/UL — HIGH (ref 0.1–0.6)
MONOCYTES # BLD AUTO: 0.93 K/UL — HIGH (ref 0.1–0.6)
MONOCYTES NFR BLD AUTO: 6.7 % — SIGNIFICANT CHANGE UP (ref 1.7–9.3)
MONOCYTES NFR BLD AUTO: 6.7 % — SIGNIFICANT CHANGE UP (ref 1.7–9.3)
MONOCYTES NFR BLD AUTO: 8.8 % — SIGNIFICANT CHANGE UP (ref 1.7–9.3)
MONOCYTES NFR BLD AUTO: 8.8 % — SIGNIFICANT CHANGE UP (ref 1.7–9.3)
MONOCYTES NFR BLD AUTO: 9.1 % — SIGNIFICANT CHANGE UP (ref 1.7–9.3)
NEUTROPHILS # BLD AUTO: 10.75 K/UL — HIGH (ref 1.4–6.5)
NEUTROPHILS # BLD AUTO: 10.75 K/UL — HIGH (ref 1.4–6.5)
NEUTROPHILS # BLD AUTO: 6.68 K/UL — HIGH (ref 1.4–6.5)
NEUTROPHILS # BLD AUTO: 6.68 K/UL — HIGH (ref 1.4–6.5)
NEUTROPHILS # BLD AUTO: 6.73 K/UL — HIGH (ref 1.4–6.5)
NEUTROPHILS # BLD AUTO: 6.73 K/UL — HIGH (ref 1.4–6.5)
NEUTROPHILS # BLD AUTO: 6.96 K/UL — HIGH (ref 1.4–6.5)
NEUTROPHILS # BLD AUTO: 6.96 K/UL — HIGH (ref 1.4–6.5)
NEUTROPHILS NFR BLD AUTO: 65.6 % — SIGNIFICANT CHANGE UP (ref 42.2–75.2)
NEUTROPHILS NFR BLD AUTO: 65.6 % — SIGNIFICANT CHANGE UP (ref 42.2–75.2)
NEUTROPHILS NFR BLD AUTO: 70.3 % — SIGNIFICANT CHANGE UP (ref 42.2–75.2)
NEUTROPHILS NFR BLD AUTO: 70.3 % — SIGNIFICANT CHANGE UP (ref 42.2–75.2)
NEUTROPHILS NFR BLD AUTO: 71.4 % — SIGNIFICANT CHANGE UP (ref 42.2–75.2)
NEUTROPHILS NFR BLD AUTO: 71.4 % — SIGNIFICANT CHANGE UP (ref 42.2–75.2)
NEUTROPHILS NFR BLD AUTO: 82.8 % — HIGH (ref 42.2–75.2)
NEUTROPHILS NFR BLD AUTO: 82.8 % — HIGH (ref 42.2–75.2)
NRBC # BLD: 0 /100 WBCS — SIGNIFICANT CHANGE UP (ref 0–0)
PLATELET # BLD AUTO: 142 K/UL — SIGNIFICANT CHANGE UP (ref 130–400)
PLATELET # BLD AUTO: 142 K/UL — SIGNIFICANT CHANGE UP (ref 130–400)
PLATELET # BLD AUTO: 146 K/UL — SIGNIFICANT CHANGE UP (ref 130–400)
PLATELET # BLD AUTO: 146 K/UL — SIGNIFICANT CHANGE UP (ref 130–400)
PLATELET # BLD AUTO: 147 K/UL — SIGNIFICANT CHANGE UP (ref 130–400)
PLATELET # BLD AUTO: 147 K/UL — SIGNIFICANT CHANGE UP (ref 130–400)
PLATELET # BLD AUTO: 148 K/UL — SIGNIFICANT CHANGE UP (ref 130–400)
PLATELET # BLD AUTO: 148 K/UL — SIGNIFICANT CHANGE UP (ref 130–400)
PMV BLD: 11.1 FL — HIGH (ref 7.4–10.4)
PMV BLD: 11.1 FL — HIGH (ref 7.4–10.4)
PMV BLD: 11.2 FL — HIGH (ref 7.4–10.4)
PMV BLD: 11.4 FL — HIGH (ref 7.4–10.4)
PMV BLD: 11.4 FL — HIGH (ref 7.4–10.4)
POTASSIUM SERPL-MCNC: 3.5 MMOL/L — SIGNIFICANT CHANGE UP (ref 3.5–5)
POTASSIUM SERPL-MCNC: 3.5 MMOL/L — SIGNIFICANT CHANGE UP (ref 3.5–5)
POTASSIUM SERPL-MCNC: 4 MMOL/L — SIGNIFICANT CHANGE UP (ref 3.5–5)
POTASSIUM SERPL-MCNC: 4 MMOL/L — SIGNIFICANT CHANGE UP (ref 3.5–5)
POTASSIUM SERPL-MCNC: 4.1 MMOL/L — SIGNIFICANT CHANGE UP (ref 3.5–5)
POTASSIUM SERPL-MCNC: 4.1 MMOL/L — SIGNIFICANT CHANGE UP (ref 3.5–5)
POTASSIUM SERPL-MCNC: 4.2 MMOL/L — SIGNIFICANT CHANGE UP (ref 3.5–5)
POTASSIUM SERPL-MCNC: 4.2 MMOL/L — SIGNIFICANT CHANGE UP (ref 3.5–5)
POTASSIUM SERPL-SCNC: 3.5 MMOL/L — SIGNIFICANT CHANGE UP (ref 3.5–5)
POTASSIUM SERPL-SCNC: 3.5 MMOL/L — SIGNIFICANT CHANGE UP (ref 3.5–5)
POTASSIUM SERPL-SCNC: 4 MMOL/L — SIGNIFICANT CHANGE UP (ref 3.5–5)
POTASSIUM SERPL-SCNC: 4 MMOL/L — SIGNIFICANT CHANGE UP (ref 3.5–5)
POTASSIUM SERPL-SCNC: 4.1 MMOL/L — SIGNIFICANT CHANGE UP (ref 3.5–5)
POTASSIUM SERPL-SCNC: 4.1 MMOL/L — SIGNIFICANT CHANGE UP (ref 3.5–5)
POTASSIUM SERPL-SCNC: 4.2 MMOL/L — SIGNIFICANT CHANGE UP (ref 3.5–5)
POTASSIUM SERPL-SCNC: 4.2 MMOL/L — SIGNIFICANT CHANGE UP (ref 3.5–5)
PRENATAL SYPHILIS TEST: SIGNIFICANT CHANGE UP
PRENATAL SYPHILIS TEST: SIGNIFICANT CHANGE UP
PROT ?TM UR-MCNC: 10 MG/DLG/24H — SIGNIFICANT CHANGE UP
PROT ?TM UR-MCNC: 10 MG/DLG/24H — SIGNIFICANT CHANGE UP
PROT SERPL-MCNC: 5.2 G/DL — LOW (ref 6–8)
PROT SERPL-MCNC: 5.2 G/DL — LOW (ref 6–8)
PROT SERPL-MCNC: 5.7 G/DL — LOW (ref 6–8)
PROT SERPL-MCNC: 5.7 G/DL — LOW (ref 6–8)
PROT SERPL-MCNC: 6.4 G/DL — SIGNIFICANT CHANGE UP (ref 6–8)
PROT SERPL-MCNC: 6.4 G/DL — SIGNIFICANT CHANGE UP (ref 6–8)
PROT SERPL-MCNC: 6.5 G/DL — SIGNIFICANT CHANGE UP (ref 6–8)
PROT SERPL-MCNC: 6.5 G/DL — SIGNIFICANT CHANGE UP (ref 6–8)
PROT/CREAT UR-RTO: 0.3 RATIO — HIGH (ref 0–0.2)
PROT/CREAT UR-RTO: 0.3 RATIO — HIGH (ref 0–0.2)
RBC # BLD: 3.84 M/UL — LOW (ref 4.2–5.4)
RBC # BLD: 3.84 M/UL — LOW (ref 4.2–5.4)
RBC # BLD: 4.16 M/UL — LOW (ref 4.2–5.4)
RBC # BLD: 4.16 M/UL — LOW (ref 4.2–5.4)
RBC # BLD: 4.73 M/UL — SIGNIFICANT CHANGE UP (ref 4.2–5.4)
RBC # BLD: 4.73 M/UL — SIGNIFICANT CHANGE UP (ref 4.2–5.4)
RBC # BLD: 4.86 M/UL — SIGNIFICANT CHANGE UP (ref 4.2–5.4)
RBC # BLD: 4.86 M/UL — SIGNIFICANT CHANGE UP (ref 4.2–5.4)
RBC # FLD: 14.4 % — SIGNIFICANT CHANGE UP (ref 11.5–14.5)
RBC # FLD: 14.4 % — SIGNIFICANT CHANGE UP (ref 11.5–14.5)
RBC # FLD: 14.5 % — SIGNIFICANT CHANGE UP (ref 11.5–14.5)
RBC # FLD: 14.5 % — SIGNIFICANT CHANGE UP (ref 11.5–14.5)
RBC # FLD: 14.6 % — HIGH (ref 11.5–14.5)
SODIUM SERPL-SCNC: 130 MMOL/L — LOW (ref 135–146)
SODIUM SERPL-SCNC: 130 MMOL/L — LOW (ref 135–146)
SODIUM SERPL-SCNC: 131 MMOL/L — LOW (ref 135–146)
SODIUM SERPL-SCNC: 131 MMOL/L — LOW (ref 135–146)
SODIUM SERPL-SCNC: 133 MMOL/L — LOW (ref 135–146)
URATE SERPL-MCNC: 4.4 MG/DL — SIGNIFICANT CHANGE UP (ref 2.5–7)
URATE SERPL-MCNC: 4.4 MG/DL — SIGNIFICANT CHANGE UP (ref 2.5–7)
URATE SERPL-MCNC: 4.6 MG/DL — SIGNIFICANT CHANGE UP (ref 2.5–7)
URATE SERPL-MCNC: 4.6 MG/DL — SIGNIFICANT CHANGE UP (ref 2.5–7)
URATE SERPL-MCNC: 4.8 MG/DL — SIGNIFICANT CHANGE UP (ref 2.5–7)
WBC # BLD: 10.19 K/UL — SIGNIFICANT CHANGE UP (ref 4.8–10.8)
WBC # BLD: 10.19 K/UL — SIGNIFICANT CHANGE UP (ref 4.8–10.8)
WBC # BLD: 13 K/UL — HIGH (ref 4.8–10.8)
WBC # BLD: 13 K/UL — HIGH (ref 4.8–10.8)
WBC # BLD: 9.58 K/UL — SIGNIFICANT CHANGE UP (ref 4.8–10.8)
WBC # BLD: 9.58 K/UL — SIGNIFICANT CHANGE UP (ref 4.8–10.8)
WBC # BLD: 9.75 K/UL — SIGNIFICANT CHANGE UP (ref 4.8–10.8)
WBC # BLD: 9.75 K/UL — SIGNIFICANT CHANGE UP (ref 4.8–10.8)
WBC # FLD AUTO: 10.19 K/UL — SIGNIFICANT CHANGE UP (ref 4.8–10.8)
WBC # FLD AUTO: 10.19 K/UL — SIGNIFICANT CHANGE UP (ref 4.8–10.8)
WBC # FLD AUTO: 13 K/UL — HIGH (ref 4.8–10.8)
WBC # FLD AUTO: 13 K/UL — HIGH (ref 4.8–10.8)
WBC # FLD AUTO: 9.58 K/UL — SIGNIFICANT CHANGE UP (ref 4.8–10.8)
WBC # FLD AUTO: 9.58 K/UL — SIGNIFICANT CHANGE UP (ref 4.8–10.8)
WBC # FLD AUTO: 9.75 K/UL — SIGNIFICANT CHANGE UP (ref 4.8–10.8)
WBC # FLD AUTO: 9.75 K/UL — SIGNIFICANT CHANGE UP (ref 4.8–10.8)

## 2023-11-14 PROCEDURE — 59400 OBSTETRICAL CARE: CPT | Mod: U7

## 2023-11-14 PROCEDURE — 88307 TISSUE EXAM BY PATHOLOGIST: CPT | Mod: 26

## 2023-11-14 RX ORDER — NALOXONE HYDROCHLORIDE 4 MG/.1ML
0.1 SPRAY NASAL
Refills: 0 | Status: DISCONTINUED | OUTPATIENT
Start: 2023-11-14 | End: 2023-11-16

## 2023-11-14 RX ORDER — OXYCODONE HYDROCHLORIDE 5 MG/1
5 TABLET ORAL ONCE
Refills: 0 | Status: DISCONTINUED | OUTPATIENT
Start: 2023-11-14 | End: 2023-11-16

## 2023-11-14 RX ORDER — SODIUM CHLORIDE 9 MG/ML
3 INJECTION INTRAMUSCULAR; INTRAVENOUS; SUBCUTANEOUS EVERY 8 HOURS
Refills: 0 | Status: DISCONTINUED | OUTPATIENT
Start: 2023-11-14 | End: 2023-11-16

## 2023-11-14 RX ORDER — DEXAMETHASONE 0.5 MG/5ML
4 ELIXIR ORAL EVERY 6 HOURS
Refills: 0 | Status: DISCONTINUED | OUTPATIENT
Start: 2023-11-14 | End: 2023-11-16

## 2023-11-14 RX ORDER — OXYTOCIN 10 UNIT/ML
2 VIAL (ML) INJECTION
Qty: 30 | Refills: 0 | Status: DISCONTINUED | OUTPATIENT
Start: 2023-11-14 | End: 2023-11-14

## 2023-11-14 RX ORDER — DIBUCAINE 1 %
1 OINTMENT (GRAM) RECTAL EVERY 6 HOURS
Refills: 0 | Status: DISCONTINUED | OUTPATIENT
Start: 2023-11-14 | End: 2023-11-16

## 2023-11-14 RX ORDER — DIPHENHYDRAMINE HCL 50 MG
25 CAPSULE ORAL EVERY 6 HOURS
Refills: 0 | Status: DISCONTINUED | OUTPATIENT
Start: 2023-11-14 | End: 2023-11-16

## 2023-11-14 RX ORDER — KETOROLAC TROMETHAMINE 30 MG/ML
30 SYRINGE (ML) INJECTION ONCE
Refills: 0 | Status: DISCONTINUED | OUTPATIENT
Start: 2023-11-14 | End: 2023-11-16

## 2023-11-14 RX ORDER — OXYCODONE HYDROCHLORIDE 5 MG/1
5 TABLET ORAL
Refills: 0 | Status: DISCONTINUED | OUTPATIENT
Start: 2023-11-14 | End: 2023-11-16

## 2023-11-14 RX ORDER — METOCLOPRAMIDE HCL 10 MG
10 TABLET ORAL ONCE
Refills: 0 | Status: COMPLETED | OUTPATIENT
Start: 2023-11-14 | End: 2023-11-14

## 2023-11-14 RX ORDER — BENZOCAINE 10 %
1 GEL (GRAM) MUCOUS MEMBRANE EVERY 6 HOURS
Refills: 0 | Status: DISCONTINUED | OUTPATIENT
Start: 2023-11-14 | End: 2023-11-16

## 2023-11-14 RX ORDER — ACETAMINOPHEN 500 MG
975 TABLET ORAL
Refills: 0 | Status: DISCONTINUED | OUTPATIENT
Start: 2023-11-14 | End: 2023-11-16

## 2023-11-14 RX ORDER — SIMETHICONE 80 MG/1
80 TABLET, CHEWABLE ORAL EVERY 4 HOURS
Refills: 0 | Status: DISCONTINUED | OUTPATIENT
Start: 2023-11-14 | End: 2023-11-16

## 2023-11-14 RX ORDER — ONDANSETRON 8 MG/1
4 TABLET, FILM COATED ORAL EVERY 6 HOURS
Refills: 0 | Status: DISCONTINUED | OUTPATIENT
Start: 2023-11-14 | End: 2023-11-16

## 2023-11-14 RX ORDER — IBUPROFEN 200 MG
600 TABLET ORAL EVERY 6 HOURS
Refills: 0 | Status: DISCONTINUED | OUTPATIENT
Start: 2023-11-14 | End: 2023-11-16

## 2023-11-14 RX ORDER — LANOLIN
1 OINTMENT (GRAM) TOPICAL EVERY 6 HOURS
Refills: 0 | Status: DISCONTINUED | OUTPATIENT
Start: 2023-11-14 | End: 2023-11-16

## 2023-11-14 RX ORDER — MAGNESIUM HYDROXIDE 400 MG/1
30 TABLET, CHEWABLE ORAL
Refills: 0 | Status: DISCONTINUED | OUTPATIENT
Start: 2023-11-14 | End: 2023-11-16

## 2023-11-14 RX ORDER — AER TRAVELER 0.5 G/1
1 SOLUTION RECTAL; TOPICAL EVERY 4 HOURS
Refills: 0 | Status: DISCONTINUED | OUTPATIENT
Start: 2023-11-14 | End: 2023-11-16

## 2023-11-14 RX ORDER — IBUPROFEN 200 MG
600 TABLET ORAL EVERY 6 HOURS
Refills: 0 | Status: COMPLETED | OUTPATIENT
Start: 2023-11-14 | End: 2024-10-12

## 2023-11-14 RX ORDER — SODIUM CHLORIDE 9 MG/ML
1000 INJECTION, SOLUTION INTRAVENOUS
Refills: 0 | Status: DISCONTINUED | OUTPATIENT
Start: 2023-11-14 | End: 2023-11-15

## 2023-11-14 RX ORDER — FENTANYL/BUPIVACAINE/NS/PF 2MCG/ML-.1
250 PLASTIC BAG, INJECTION (ML) INJECTION
Refills: 0 | Status: DISCONTINUED | OUTPATIENT
Start: 2023-11-14 | End: 2023-11-16

## 2023-11-14 RX ORDER — TETANUS TOXOID, REDUCED DIPHTHERIA TOXOID AND ACELLULAR PERTUSSIS VACCINE, ADSORBED 5; 2.5; 8; 8; 2.5 [IU]/.5ML; [IU]/.5ML; UG/.5ML; UG/.5ML; UG/.5ML
0.5 SUSPENSION INTRAMUSCULAR ONCE
Refills: 0 | Status: DISCONTINUED | OUTPATIENT
Start: 2023-11-14 | End: 2023-11-16

## 2023-11-14 RX ADMIN — Medication 975 MILLIGRAM(S): at 17:45

## 2023-11-14 RX ADMIN — Medication 108 GRAM(S): at 01:07

## 2023-11-14 RX ADMIN — Medication 50 GM/HR: at 12:32

## 2023-11-14 RX ADMIN — Medication 975 MILLIGRAM(S): at 18:19

## 2023-11-14 RX ADMIN — Medication 975 MILLIGRAM(S): at 21:50

## 2023-11-14 RX ADMIN — Medication 108 GRAM(S): at 05:11

## 2023-11-14 RX ADMIN — Medication 1000 MILLIUNIT(S)/MIN: at 12:32

## 2023-11-14 RX ADMIN — SODIUM CHLORIDE 75 MILLILITER(S): 9 INJECTION, SOLUTION INTRAVENOUS at 17:45

## 2023-11-14 RX ADMIN — Medication 10 MILLIGRAM(S): at 05:46

## 2023-11-14 RX ADMIN — Medication 600 MILLIGRAM(S): at 18:43

## 2023-11-14 RX ADMIN — Medication 975 MILLIGRAM(S): at 21:20

## 2023-11-14 RX ADMIN — Medication 2 MILLIUNIT(S)/MIN: at 06:59

## 2023-11-14 RX ADMIN — Medication 650 MILLIGRAM(S): at 00:01

## 2023-11-14 RX ADMIN — Medication 108 GRAM(S): at 09:24

## 2023-11-14 NOTE — PROGRESS NOTE ADULT - ASSESSMENT
A/P: 29 y/o now para 2, s/p , PPD0, h/o cHTN now with superimposed preeclampsia, s/p procardia 10mg IR, s/p labetalol 20mg IVP, s/p cytotec x1. On magnesium for seizure prophylaxis.     - Magnesium was turned off for 1.5 hours for bleeding s/p VD, restarted at 1230  - continue magnesium until 11/15 @1102  - next labs and magnesium level at 2100  - f/u BPs; Current BPs 121/86  - stricts I/Os  - continuous EFM and toco  - Seizure precautions  - Reevaluate in 2hours

## 2023-11-14 NOTE — PROGRESS NOTE ADULT - SUBJECTIVE AND OBJECTIVE BOX
PGY1 Magnesium Note     Subjective:   Pt evaluated at bedside for magnesium check. She denies headache, vision changes, dizziness, SOB, chest pain, RUQ/epigastric pain.    Objective:   Vital signs: T(F): 98.2 (11-14-23 @ 11:33), Max: 98.2 (11-14-23 @ 11:33)  HR: 88 (11-14-23 @ 12:19) (71 - 105)  BP: 112/77 (11-14-23 @ 12:19) (93/56 - 187/98)  RR: 16 (11-14-23 @ 09:17) (16 - 18)  SpO2: 97% (11-14-23 @ 09:41) (96% - 99%)    11-13-23 @ 07:01  -  11-14-23 @ 07:00  --------------------------------------------------------  IN: 1375 mL / OUT: 5375 mL / NET: -4000 mL    11-14-23 @ 07:01  -  11-14-23 @ 12:26  --------------------------------------------------------  IN: 575 mL / OUT: 1368 mL / NET: -793 mL    Gen: NAD, AAOx3  CV: S1S2, RRR, no M/R/G  Pulm: CTAB, no rhonchi or rales or wheezing  Ext: no calf tenderness or edema SCDs in place  Neuro: 1+ DTR bilaterally  Abd: soft, nontender, no rebound or guarding, no epigastric tenderness  LOCHIA: minimal   Medications:  acetaminophen     Tablet ..: 650 (11-14-23 @ 00:01)  ampicillin  IVPB: 200 (11-13-23 @ 21:10)  ampicillin  IVPB: 108 (11-14-23 @ 09:24),  108 (11-14-23 @ 05:11),  108 (11-14-23 @ 01:07)  labetalol Injectable: 20 (11-13-23 @ 19:17)  magnesium sulfate  IVPB: 300 (11-13-23 @ 19:53)  magnesium sulfate Infusion: 50 (11-13-23 @ 19:28)  metoclopramide Injectable: 10 (11-14-23 @ 05:46)  misoprostol: 25 (11-13-23 @ 20:49)  NIFEdipine IR: 10 (11-13-23 @ 18:15)  oxytocin Infusion.: 2 (11-14-23 @ 05:27)      Labs:                         13.5   9.75  )-----------( 146      ( 14 Nov 2023 08:23 )             39.8   11-14    133<L>  |  99  |  7<L>  ----------------------------<  85  4.0   |  20  |  0.5<L>    Ca    7.6<L>      14 Nov 2023 08:23  Mg     5.5     11-14    TPro  6.4  /  Alb  3.7  /  TBili  0.4  /  DBili  x   /  AST  18  /  ALT  17  /  AlkPhos  91  11-14      Urinalysis Basic - ( 14 Nov 2023 08:23 )    Color: x / Appearance: x / SG: x / pH: x  Gluc: 85 mg/dL / Ketone: x  / Bili: x / Urobili: x   Blood: x / Protein: x / Nitrite: x   Leuk Esterase: x / RBC: x / WBC x   Sq Epi: x / Non Sq Epi: x / Bacteria: x

## 2023-11-14 NOTE — PROGRESS NOTE ADULT - SUBJECTIVE AND OBJECTIVE BOX
PGY 1 Magnesium Note     Subjective:   Pt evaluated at bedside for magnesium check. Patient endorses mild frontal headache that has been present since she was started on the magnesium, states pain has not worsened. She denies flushing, headache, vision changes, dizziness, SOB, chest pain, RUQ/epigastric pain.      Objective:   Vital signs: T(F): 98.2 (11-13-23 @ 21:23), Max: 98.2 (11-13-23 @ 21:23)  HR: 84 (11-14-23 @ 05:55) (75 - 90)  BP: 134/92 (11-14-23 @ 05:55) (128/79 - 173/90)  RR: 18 (11-14-23 @ 02:25) (16 - 18)    11-13-23 @ 07:01  -  11-14-23 @ 06:05  --------------------------------------------------------  IN: 1250 mL / OUT: 5150 mL / NET: -3900 mL        Gen: NAD, AAOx3  CV: S1S2, RRR, no M/R/G  Pulm: CTAB  Ext: no calf tenderness or edema SCDs in place  Neuro: 2+ DTR bilaterally  Abd: soft, gravid, nontender, no rebound or guarding, no epigastric tenderness    EFM: 125/mod/+accel  [place]: irreg  SVE:  deferred, 2.5/50/-3 per Dr. Flores @0103    Medications:  (ADM OVERRIDE): 1 (11-13-23 @ 20:37)  (ADM OVERRIDE): 2 (11-13-23 @ 23:58)  (ADM OVERRIDE): 1 (11-14-23 @ 01:00)  (ADM OVERRIDE): 1 (11-14-23 @ 05:04)  (ADM OVERRIDE): 1 (11-14-23 @ 01:00)  (ADM OVERRIDE): 1 (11-13-23 @ 19:32)  (ADM OVERRIDE): 1 (11-13-23 @ 19:32)  (ADM OVERRIDE): 1 (11-13-23 @ 18:16)  (ADM OVERRIDE): 1 (11-13-23 @ 21:07)  (ADM OVERRIDE): 1 (11-14-23 @ 05:42)  acetaminophen     Tablet ..: 650 (11-14-23 @ 00:01)  ampicillin  IVPB: 200 (11-13-23 @ 21:10)  ampicillin  IVPB: 108 (11-14-23 @ 05:11),  108 (11-14-23 @ 01:07)  labetalol Injectable: 20 (11-13-23 @ 19:17)  magnesium sulfate  IVPB: 300 (11-13-23 @ 19:53)  magnesium sulfate Infusion: 50 (11-13-23 @ 19:28)  metoclopramide Injectable: 10 (11-14-23 @ 05:46)  misoprostol: 25 (11-13-23 @ 20:49)  NIFEdipine IR: 10 (11-13-23 @ 18:15)      Labs:                         13.9   10.19 )-----------( 147      ( 14 Nov 2023 02:03 )             41.8   11-14    133<L>  |  100  |  9<L>  ----------------------------<  84  4.1   |  21  |  0.6<L>    Ca    8.2<L>      14 Nov 2023 02:03  Mg     4.6     11-14    TPro  6.5  /  Alb  3.7  /  TBili  0.3  /  DBili  x   /  AST  17  /  ALT  16  /  AlkPhos  91  11-14      Urinalysis Basic - ( 14 Nov 2023 02:03 )    Color: x / Appearance: x / SG: x / pH: x  Gluc: 84 mg/dL / Ketone: x  / Bili: x / Urobili: x   Blood: x / Protein: x / Nitrite: x   Leuk Esterase: x / RBC: x / WBC x   Sq Epi: x / Non Sq Epi: x / Bacteria: x        A/P: 28y GXPXs at *w*d with preeclampsia with severe features on magnesium for seizure prophylaxis  - continue magnesium until **  - next labs and magnesium level at **  - f/u BPs; Current BPs **  - stricts I/Os  - continuous EFM and toco  - Seizure precautions  - Reevaluate in 2hours     Dr. TRINH and Dr. TRINH aware   PGY 1 Magnesium Note     Subjective:   Pt evaluated at bedside for magnesium check. Patient endorses mild frontal headache that has been present since she was started on the magnesium, states pain has not worsened. She denies flushing, headache, vision changes, dizziness, SOB, chest pain, RUQ/epigastric pain.      Objective:   Vital signs: T(F): 98.2 (11-13-23 @ 21:23), Max: 98.2 (11-13-23 @ 21:23)  HR: 84 (11-14-23 @ 05:55) (75 - 90)  BP: 134/92 (11-14-23 @ 05:55) (128/79 - 173/90)  RR: 18 (11-14-23 @ 02:25) (16 - 18)    11-13-23 @ 07:01  -  11-14-23 @ 06:05  --------------------------------------------------------  IN: 1250 mL / OUT: 5150 mL / NET: -3900 mL        Gen: NAD, AAOx3  CV: S1S2, RRR, no M/R/G  Pulm: CTAB  Ext: no calf tenderness or edema SCDs in place  Neuro: 2+ DTR bilaterally  Abd: soft, gravid, nontender, no rebound or guarding, no epigastric tenderness    EFM: 125/mod/+accel  [place]: irreg  SVE:  deferred, 2.5/50/-3 per Dr. Flores @0103    Medications:  (ADM OVERRIDE): 1 (11-13-23 @ 20:37)  (ADM OVERRIDE): 2 (11-13-23 @ 23:58)  (ADM OVERRIDE): 1 (11-14-23 @ 01:00)  (ADM OVERRIDE): 1 (11-14-23 @ 05:04)  (ADM OVERRIDE): 1 (11-14-23 @ 01:00)  (ADM OVERRIDE): 1 (11-13-23 @ 19:32)  (ADM OVERRIDE): 1 (11-13-23 @ 19:32)  (ADM OVERRIDE): 1 (11-13-23 @ 18:16)  (ADM OVERRIDE): 1 (11-13-23 @ 21:07)  (ADM OVERRIDE): 1 (11-14-23 @ 05:42)  acetaminophen     Tablet ..: 650 (11-14-23 @ 00:01)  ampicillin  IVPB: 200 (11-13-23 @ 21:10)  ampicillin  IVPB: 108 (11-14-23 @ 05:11),  108 (11-14-23 @ 01:07)  labetalol Injectable: 20 (11-13-23 @ 19:17)  magnesium sulfate  IVPB: 300 (11-13-23 @ 19:53)  magnesium sulfate Infusion: 50 (11-13-23 @ 19:28)  metoclopramide Injectable: 10 (11-14-23 @ 05:46)  misoprostol: 25 (11-13-23 @ 20:49)  NIFEdipine IR: 10 (11-13-23 @ 18:15)      Labs:                         13.9   10.19 )-----------( 147      ( 14 Nov 2023 02:03 )             41.8   11-14    133<L>  |  100  |  9<L>  ----------------------------<  84  4.1   |  21  |  0.6<L>    Ca    8.2<L>      14 Nov 2023 02:03  Mg     4.6     11-14    TPro  6.5  /  Alb  3.7  /  TBili  0.3  /  DBili  x   /  AST  17  /  ALT  16  /  AlkPhos  91  11-14      Urinalysis Basic - ( 14 Nov 2023 02:03 )    Color: x / Appearance: x / SG: x / pH: x  Gluc: 84 mg/dL / Ketone: x  / Bili: x / Urobili: x   Blood: x / Protein: x / Nitrite: x   Leuk Esterase: x / RBC: x / WBC x   Sq Epi: x / Non Sq Epi: x / Bacteria: x        A/P: 28y GXPXs at *w*d with preeclampsia with severe features on magnesium for seizure prophylaxis  - continue magnesium until **  - next labs and magnesium level at **  - f/u BPs; Current BPs **  - stricts I/Os  - continuous EFM and toco  - Seizure precautions  - Reevaluate in 2hours     Dr. TRINH and Dr. TRINH aware   PGY 1 Magnesium Note     Subjective:   Pt evaluated at bedside for magnesium check. Patient endorses mild frontal headache that has been present since she was started on the magnesium, states pain has not worsened. She denies flushing, headache, vision changes, dizziness, SOB, chest pain, RUQ/epigastric pain.      Objective:   Vital signs: T(F): 98.2 (11-13-23 @ 21:23), Max: 98.2 (11-13-23 @ 21:23)  HR: 84 (11-14-23 @ 05:55) (75 - 90)  BP: 134/92 (11-14-23 @ 05:55) (128/79 - 173/90)  RR: 18 (11-14-23 @ 02:25) (16 - 18)    11-13-23 @ 07:01  -  11-14-23 @ 06:05  --------------------------------------------------------  IN: 1250 mL / OUT: 5150 mL / NET: -3900 mL        Gen: NAD, AAOx3  CV: S1S2, RRR, no M/R/G  Pulm: CTAB  Ext: no calf tenderness or edema SCDs in place  Neuro: 2+ DTR bilaterally  Abd: soft, gravid, nontender, no rebound or guarding, no epigastric tenderness    EFM: 125/mod/+accel  [place]: irreg  SVE:  deferred, 2.5/50/-3 per Dr. Flores @0103    Medications:  (ADM OVERRIDE): 1 (11-13-23 @ 20:37)  (ADM OVERRIDE): 2 (11-13-23 @ 23:58)  (ADM OVERRIDE): 1 (11-14-23 @ 01:00)  (ADM OVERRIDE): 1 (11-14-23 @ 05:04)  (ADM OVERRIDE): 1 (11-14-23 @ 01:00)  (ADM OVERRIDE): 1 (11-13-23 @ 19:32)  (ADM OVERRIDE): 1 (11-13-23 @ 19:32)  (ADM OVERRIDE): 1 (11-13-23 @ 18:16)  (ADM OVERRIDE): 1 (11-13-23 @ 21:07)  (ADM OVERRIDE): 1 (11-14-23 @ 05:42)  acetaminophen     Tablet ..: 650 (11-14-23 @ 00:01)  ampicillin  IVPB: 200 (11-13-23 @ 21:10)  ampicillin  IVPB: 108 (11-14-23 @ 05:11),  108 (11-14-23 @ 01:07)  labetalol Injectable: 20 (11-13-23 @ 19:17)  magnesium sulfate  IVPB: 300 (11-13-23 @ 19:53)  magnesium sulfate Infusion: 50 (11-13-23 @ 19:28)  metoclopramide Injectable: 10 (11-14-23 @ 05:46)  misoprostol: 25 (11-13-23 @ 20:49)  NIFEdipine IR: 10 (11-13-23 @ 18:15)      Labs:                         13.9   10.19 )-----------( 147      ( 14 Nov 2023 02:03 )             41.8   11-14    133<L>  |  100  |  9<L>  ----------------------------<  84  4.1   |  21  |  0.6<L>    Ca    8.2<L>      14 Nov 2023 02:03  Mg     4.6     11-14    TPro  6.5  /  Alb  3.7  /  TBili  0.3  /  DBili  x   /  AST  17  /  ALT  16  /  AlkPhos  91  11-14      Urinalysis Basic - ( 14 Nov 2023 02:03 )    Color: x / Appearance: x / SG: x / pH: x  Gluc: 84 mg/dL / Ketone: x  / Bili: x / Urobili: x   Blood: x / Protein: x / Nitrite: x   Leuk Esterase: x / RBC: x / WBC x   Sq Epi: x / Non Sq Epi: x / Bacteria: x        A/P: 28y GXPXs at *w*d with preeclampsia with severe features on magnesium for seizure prophylaxis  - continue magnesium until **  - next labs and magnesium level at **  - f/u BPs; Current BPs **  - stricts I/Os  - continuous EFM and toco  - Seizure precautions  - Reevaluate in 2hours     Dr. TRINH and Dr. TRINH aware

## 2023-11-14 NOTE — OB PROVIDER IHI INDUCTION/AUGMENTATION NOTE - NS_CHECKALL_OBGYN_ALL_OB
Order was written/H&P was completed/Contractions pattern was reviewed/FHR was reviewed/Induction / Augmentation was discussed
Alert and oriented to person, place and time

## 2023-11-14 NOTE — PROCEDURE NOTE - ADDITIONAL PROCEDURE DETAILS
Called to pt room for epidural [id number]  Procedure reviewed, risks/benefits/alternatives discussed and consent obtained  RADHA 8cm catheter threaded to 13 cm easily  Patient placed on PIEB/PCEA and instructed to request anesthesia with any questions/concerns  VSS/FHR WNL

## 2023-11-14 NOTE — OB PROVIDER DELIVERY SUMMARY - NSSELHIDDEN_OBGYN_ALL_OB_FT
[NS_DeliveryRN_OBGYN_ALL_OB_FT:[email or web address]],[NS_DeliveryAttending1_OBGYN_ALL_OB_FT:[id number]]

## 2023-11-14 NOTE — PRE-ANESTHESIA EVALUATION ADULT - NSANTHAPLANRD_GEN_ALL_CORE
From: Rustam Navarrete  To: Otis Dalton,   Sent: 7/21/2020 8:43 AM CDT  Subject: Medication Question    Good morning Dr Dalton,  The Buspirone feels to be helping reduce my anxiety. I realized A little too late to go to twice daily dosing instead of three as was at the start. Anyway, I have 22 pills left which will get through this week, but I wanted to make sure to not wait to late to request a refill and then run the risk of missing doses.  Please let me know if we need to have a checkin. Thank you.  -Rustam  
regional

## 2023-11-14 NOTE — OB PROVIDER LABOR PROGRESS NOTE - NS_SUBJECTIVE/OBJECTIVE_OBGYN_ALL_OB_FT
Denies visual changes, RUQ pain, denies pain management at this time.  Pt evaluated at bedside for magnesium check. She reports continued HA since starting magnesium,  mild in nature, not worsening.  She denies visual disturbances and right upper quadrant pain. Also denies nausea/vomiting/chest pain/epigastric pain/shortness of breath.   Vital Signs (24 Hrs):  T(C): 36.4 (23 @ 07:10), Max: 36.8 (23 @ 21:23)  HR: 80 (23 @ 08:25) (74 - 90)  BP: 133/89 (23 @ 08:25) (119/81 - 180/104)  RR: 16 (23 @ 07:10) (16 - 18)    GEN: NAD  Resp: CTA b/l  CVS S1S2 RRR  Abd: gravid, NT  VE:deferred  Ext: +1 DTRs b/l          Urine outpt:  23 @ 07:01  -  23 @ 07:00  --------------------------------------------------------  IN: 1375 mL / OUT: 5375 mL / NET: -4000 mL    23 @ 07:01  -  23 @ 08:32  --------------------------------------------------------  IN: 0 mL / OUT: 400 mL / NET: -400 mL        Medications:  ampicillin  IVPB 1 Gram(s) IV Intermittent every 4 hours  labetalol Injectable 40 milliGRAM(s) IV Push once  lactated ringers. 1000 milliLiter(s) IV Continuous <Continuous>  magnesium sulfate  IVPB 4 Gram(s) IV Intermittent once  magnesium sulfate Infusion 2 Gm/Hr IV Continuous <Continuous>  magnesium sulfate Infusion 2 Gm/Hr IV Continuous <Continuous>  oxytocin Infusion 333.333 milliUNIT(s)/Min IV Continuous <Continuous>  oxytocin Infusion. 2 milliUNIT(s)/Min IV Continuous <Continuous>    No Known Allergies      Labs:                        13.9   10.19 )-----------( 147      ( 2023 02:03 )             41.8         133<L>  |  100  |  9<L>  ----------------------------<  84  4.1   |  21  |  0.6<L>    Ca    8.2<L>      2023 02:03  Mg     4.6         TPro  6.5  /  Alb  3.7  /  TBili  0.3  /  DBili  x   /  AST  17  /  ALT  16  /  AlkPhos  91      Uric Acid: 4.8 mg/dL ( @ 02:03)  Magnesium: 4.6 mg/dL ( @ 02:03)  Uric Acid: 5.2 mg/dL ( @ 18:00)      Assessment:  28y  at 37W0D,  on magnesium for preeclampsia with  severe features    Plan:    -Labs drawn at 0800  -Cont to monitor BPs and I/Os  -Pain management prn  -Monitor for signs and symptoms of Magnesium toxicity  -Cont efm/toco

## 2023-11-14 NOTE — OB PROVIDER LABOR PROGRESS NOTE - NS_SUBJECTIVE/OBJECTIVE_OBGYN_ALL_OB_FT
Pt evaluated at bedside for magnesium check. She denies visual disturbances, headache and right upper quadrant pain. Also denies nausea/vomiting/chest pain/epigastric pain/shortness of breath.     Objective:   T(F): 97.8 ( @ 15:12), Max: 98.2 ( @ 11:33)  HR: 93 ( @ 16:07)  BP: 136/87 ( @ 16:04) (93/56 - 187/98)  RR: 16 ( 09:17)  SpO2: 98% ( 16:07)  Vital Signs Last 24 Hrs  BP: 136/87 (2023 16:04) (93/56 - 187/98)     @ 07:01  -   @ 07:00  --------------------------------------------------------  IN: 1375 mL / OUT: 5375 mL / NET: -4000 mL     @ 07:01  -   @ 16:12  --------------------------------------------------------  IN: 950 mL / OUT: 1903 mL / NET: -953 mL          Gen: NAD, AAOx3  CV: RRR, no M/R/G  Pulm: CTAB, no R/R/W  : [place]   Ext: +1 edema edy, SCDs in place  Neuro: Reflexes 2+ bilaterally upper and lower  Urine outpt:  23 @ 07:01  -  23 @ 07:00  --------------------------------------------------------  IN: 1375 mL / OUT: 5375 mL / NET: -4000 mL    23 @ 07:01  -  23 @ 16:12  --------------------------------------------------------  IN: 950 mL / OUT: 1903 mL / NET: -953 mL        Medications:  Magnesium    No Known Allergies          Uric Acid: 4.6 mg/dL ( @ 14:52)  Magnesium: 5.1 mg/dL ( @ 14:52)      Assessment:  28y P1 s/p    on magnesium for preeclampsia with severe features    Plan:  -Continue magnesium until: 1100   -Repeat Mag level at 2030  -Cont to monitor BPs and I/Os  -Monitor for signs and symptoms of Magnesium toxicity

## 2023-11-14 NOTE — OB RN DELIVERY SUMMARY - NSSELHIDDEN_OBGYN_ALL_OB_FT
[NS_DeliveryRN_OBGYN_ALL_OB_FT:[email or web address]=] [NS_DeliveryRN_OBGYN_ALL_OB_FT:[email or web address]=],[NS_DeliveryAttending1_OBGYN_ALL_OB_FT:[id number]=]

## 2023-11-14 NOTE — PROGRESS NOTE ADULT - ASSESSMENT
29 y/o now para 2, h/o cHTN now with superimposed preeclampsia, s/p procardia 10mg IR, s/p labetalol 20mg IVP, s/p cytotec x1. On magnesium for seizure prophylaxis.   -Magnesium was turned off for 1.5 hours for bleeding s/p VD, restarted at 1230  - continue magnesium until 11/15 @1102  - next labs and magnesium level at 1430  - f/u BPs; Current BPs 112/77  - stricts I/Os  - continuous EFM and toco  - Seizure precautions  - Reevaluate in 2hours    27 y/o now para 2, h/o cHTN now with superimposed preeclampsia, s/p procardia 10mg IR, s/p labetalol 20mg IVP, s/p cytotec x1. On magnesium for seizure prophylaxis.   -Magnesium was turned off for 1.5 hours for bleeding s/p VD, restarted at 1230  - continue magnesium until 11/15 @1102  - next labs and magnesium level at 1430  - f/u BPs; Current BPs 112/77  - stricts I/Os  - continuous EFM and toco  - Seizure precautions  - Reevaluate in 2hours

## 2023-11-14 NOTE — PROGRESS NOTE ADULT - ASSESSMENT
29 y/o  at 36w6d, GBS unknown, h/o cHTN, s/p procardia 10mg IR, s/p labetalol 20mg IVP, IOL for superimposed preeclampsia, s/p cytotec x1, in early labor. On magnesium for seizure prophylaxis.   - continue magnesium until 24hr PP  - next labs and magnesium level at 0800  - f/u BPs; Current BPs 134/92  - stricts I/Os  - continuous EFM and toco  - Seizure precautions  - Reevaluate in 2hours   - ampicillin for GBS ppx

## 2023-11-14 NOTE — PROCEDURAL SAFETY CHECKLIST WITH OR WITHOUT SEDATION - NSTEAMCONFIRM_GEN_ALL_CORE
Called [facility] about exposure to covid positive pt Natalya Cordobava Pt tested positive 8/28. Was admitted for atrial flutter. Dates of concern 8/27 to 8/28. Unable to leave voicemail. Line disconnects before able to leave message.    done

## 2023-11-14 NOTE — PROGRESS NOTE ADULT - ASSESSMENT
A/P: 27 y/o now para 2, s/p , PPD0, h/o cHTN now with superimposed preeclampsia, s/p procardia 10mg IR, s/p labetalol 20mg IVP, s/p cytotec x1. On magnesium for seizure prophylaxis.     - Magnesium was turned off for 1.5 hours for bleeding s/p VD, restarted at 1230  - continue magnesium until 11/15 @1102  - next labs and magnesium level at 2100  - f/u BPs; Current BPs 134/83  - stricts I/Os  - continuous EFM and toco  - Seizure precautions  - Reevaluate in 2hours     Case discussed with Dr. Griffiths and Dr. Gallagher

## 2023-11-14 NOTE — PROGRESS NOTE ADULT - SUBJECTIVE AND OBJECTIVE BOX
PGY1 Magnesium Note     Subjective:   Pt evaluated at bedside for magnesium check. She denies headache, vision changes, dizziness, SOB, chest pain, RUQ/epigastric pain.    Objective:   Vital signs: T(F): 97.8 (11-14-23 @ 15:12), Max: 98.2 (11-14-23 @ 11:33)  HR: 87 (11-14-23 @ 18:48) (71 - 110)  BP: 128/86 (11-14-23 @ 18:36) (93/56 - 187/98)  RR: 16 (11-14-23 @ 09:17) (16 - 16)  SpO2: 97% (11-14-23 @ 18:48) (94% - 99%)    11-13-23 @ 07:01  -  11-14-23 @ 07:00  --------------------------------------------------------  IN: 1375 mL / OUT: 5375 mL / NET: -4000 mL    11-14-23 @ 07:01  -  11-14-23 @ 18:52  --------------------------------------------------------  IN: 1525 mL / OUT: 2803 mL / NET: -1278 mL    Gen: NAD, AAOx3  CV: S1S2, RRR, no M/R/G  Pulm: CTAB, no rhonchi or rales or wheezing  Ext: no calf tenderness or edema SCDs in place  Neuro: 1+ DTR bilaterally  Abd: soft, GRAVId, nontender, no rebound or guarding, no epigastric tenderness  LOCHIA: minimal     Medications:  acetaminophen     Tablet ..: 975 (11-14-23 @ 17:45)  acetaminophen     Tablet ..: 650 (11-14-23 @ 00:01)  ampicillin  IVPB: 200 (11-13-23 @ 21:10)  ampicillin  IVPB: 108 (11-14-23 @ 09:24),  108 (11-14-23 @ 05:11),  108 (11-14-23 @ 01:07)  ibuprofen  Tablet.: 600 (11-14-23 @ 18:43)  labetalol Injectable: 20 (11-13-23 @ 19:17)  lactated ringers.: 75 (11-14-23 @ 14:45)  magnesium sulfate  IVPB: 300 (11-13-23 @ 19:53)  magnesium sulfate Infusion: 50 (11-13-23 @ 20:22),  50 (11-13-23 @ 19:28)  metoclopramide Injectable: 10 (11-14-23 @ 05:46)  misoprostol: 25 (11-13-23 @ 20:49)  oxytocin Infusion: 1000 (11-13-23 @ 18:55)  oxytocin Infusion.: 2 (11-14-23 @ 05:27)      Labs:                         12.0   13.00 )-----------( 148      ( 14 Nov 2023 14:52 )             35.8   11-14    130<L>  |  98  |  7<L>  ----------------------------<  135<H>  4.2   |  22  |  0.6<L>    Ca    6.9<L>      14 Nov 2023 14:52  Mg     5.1     11-14    TPro  5.7<L>  /  Alb  3.3<L>  /  TBili  0.5  /  DBili  x   /  AST  17  /  ALT  15  /  AlkPhos  80  11-14      Urinalysis Basic - ( 14 Nov 2023 14:52 )    Color: x / Appearance: x / SG: x / pH: x  Gluc: 135 mg/dL / Ketone: x  / Bili: x / Urobili: x   Blood: x / Protein: x / Nitrite: x   Leuk Esterase: x / RBC: x / WBC x   Sq Epi: x / Non Sq Epi: x / Bacteria: x       PGY1 Magnesium Note     Subjective:   Pt evaluated at bedside for magnesium check. She denies headache, vision changes, dizziness, SOB, chest pain, RUQ/epigastric pain.    Objective:   Vital signs: T(F): 97.8 (11-14-23 @ 15:12), Max: 98.2 (11-14-23 @ 11:33)  HR: 87 (11-14-23 @ 18:48) (71 - 110)  BP: 128/86 (11-14-23 @ 18:36) (93/56 - 187/98)  RR: 16 (11-14-23 @ 09:17) (16 - 16)  SpO2: 97% (11-14-23 @ 18:48) (94% - 99%)    11-13-23 @ 07:01  -  11-14-23 @ 07:00  --------------------------------------------------------  IN: 1375 mL / OUT: 5375 mL / NET: -4000 mL    11-14-23 @ 07:01  -  11-14-23 @ 18:52  --------------------------------------------------------  IN: 1525 mL / OUT: 2803 mL / NET: -1278 mL    Gen: NAD, AAOx3  CV: S1S2, RRR, no M/R/G  Pulm: CTAB, no rhonchi or rales or wheezing  Ext: no calf tenderness or edema SCDs in place  Neuro: 1+ DTR bilaterally  Abd: soft, nontender, no rebound or guarding, no epigastric tenderness  LOCHIA: minimal     Medications:  acetaminophen     Tablet ..: 975 (11-14-23 @ 17:45)  acetaminophen     Tablet ..: 650 (11-14-23 @ 00:01)  ampicillin  IVPB: 200 (11-13-23 @ 21:10)  ampicillin  IVPB: 108 (11-14-23 @ 09:24),  108 (11-14-23 @ 05:11),  108 (11-14-23 @ 01:07)  ibuprofen  Tablet.: 600 (11-14-23 @ 18:43)  labetalol Injectable: 20 (11-13-23 @ 19:17)  lactated ringers.: 75 (11-14-23 @ 14:45)  magnesium sulfate  IVPB: 300 (11-13-23 @ 19:53)  magnesium sulfate Infusion: 50 (11-13-23 @ 20:22),  50 (11-13-23 @ 19:28)  metoclopramide Injectable: 10 (11-14-23 @ 05:46)  misoprostol: 25 (11-13-23 @ 20:49)  oxytocin Infusion: 1000 (11-13-23 @ 18:55)  oxytocin Infusion.: 2 (11-14-23 @ 05:27)      Labs:                         12.0   13.00 )-----------( 148      ( 14 Nov 2023 14:52 )             35.8   11-14    130<L>  |  98  |  7<L>  ----------------------------<  135<H>  4.2   |  22  |  0.6<L>    Ca    6.9<L>      14 Nov 2023 14:52  Mg     5.1     11-14    TPro  5.7<L>  /  Alb  3.3<L>  /  TBili  0.5  /  DBili  x   /  AST  17  /  ALT  15  /  AlkPhos  80  11-14      Urinalysis Basic - ( 14 Nov 2023 14:52 )    Color: x / Appearance: x / SG: x / pH: x  Gluc: 135 mg/dL / Ketone: x  / Bili: x / Urobili: x   Blood: x / Protein: x / Nitrite: x   Leuk Esterase: x / RBC: x / WBC x   Sq Epi: x / Non Sq Epi: x / Bacteria: x

## 2023-11-15 LAB
ALBUMIN SERPL ELPH-MCNC: 3.2 G/DL — LOW (ref 3.5–5.2)
ALBUMIN SERPL ELPH-MCNC: 3.2 G/DL — LOW (ref 3.5–5.2)
ALP SERPL-CCNC: 75 U/L — SIGNIFICANT CHANGE UP (ref 30–115)
ALP SERPL-CCNC: 75 U/L — SIGNIFICANT CHANGE UP (ref 30–115)
ALT FLD-CCNC: 15 U/L — SIGNIFICANT CHANGE UP (ref 0–41)
ALT FLD-CCNC: 15 U/L — SIGNIFICANT CHANGE UP (ref 0–41)
ANION GAP SERPL CALC-SCNC: 8 MMOL/L — SIGNIFICANT CHANGE UP (ref 7–14)
ANION GAP SERPL CALC-SCNC: 8 MMOL/L — SIGNIFICANT CHANGE UP (ref 7–14)
AST SERPL-CCNC: 19 U/L — SIGNIFICANT CHANGE UP (ref 0–41)
AST SERPL-CCNC: 19 U/L — SIGNIFICANT CHANGE UP (ref 0–41)
BASOPHILS # BLD AUTO: 0.03 K/UL — SIGNIFICANT CHANGE UP (ref 0–0.2)
BASOPHILS # BLD AUTO: 0.03 K/UL — SIGNIFICANT CHANGE UP (ref 0–0.2)
BASOPHILS NFR BLD AUTO: 0.4 % — SIGNIFICANT CHANGE UP (ref 0–1)
BASOPHILS NFR BLD AUTO: 0.4 % — SIGNIFICANT CHANGE UP (ref 0–1)
BILIRUB SERPL-MCNC: 0.3 MG/DL — SIGNIFICANT CHANGE UP (ref 0.2–1.2)
BILIRUB SERPL-MCNC: 0.3 MG/DL — SIGNIFICANT CHANGE UP (ref 0.2–1.2)
BUN SERPL-MCNC: 5 MG/DL — LOW (ref 10–20)
BUN SERPL-MCNC: 5 MG/DL — LOW (ref 10–20)
CALCIUM SERPL-MCNC: 6.5 MG/DL — LOW (ref 8.4–10.4)
CALCIUM SERPL-MCNC: 6.5 MG/DL — LOW (ref 8.4–10.4)
CHLORIDE SERPL-SCNC: 104 MMOL/L — SIGNIFICANT CHANGE UP (ref 98–110)
CHLORIDE SERPL-SCNC: 104 MMOL/L — SIGNIFICANT CHANGE UP (ref 98–110)
CO2 SERPL-SCNC: 23 MMOL/L — SIGNIFICANT CHANGE UP (ref 17–32)
CO2 SERPL-SCNC: 23 MMOL/L — SIGNIFICANT CHANGE UP (ref 17–32)
CREAT SERPL-MCNC: 0.6 MG/DL — LOW (ref 0.7–1.5)
CREAT SERPL-MCNC: 0.6 MG/DL — LOW (ref 0.7–1.5)
EGFR: 124 ML/MIN/1.73M2 — SIGNIFICANT CHANGE UP
EGFR: 124 ML/MIN/1.73M2 — SIGNIFICANT CHANGE UP
EOSINOPHIL # BLD AUTO: 0.09 K/UL — SIGNIFICANT CHANGE UP (ref 0–0.7)
EOSINOPHIL # BLD AUTO: 0.09 K/UL — SIGNIFICANT CHANGE UP (ref 0–0.7)
EOSINOPHIL NFR BLD AUTO: 1.2 % — SIGNIFICANT CHANGE UP (ref 0–8)
EOSINOPHIL NFR BLD AUTO: 1.2 % — SIGNIFICANT CHANGE UP (ref 0–8)
FETAL SCREEN: SIGNIFICANT CHANGE UP
FETAL SCREEN: SIGNIFICANT CHANGE UP
GLUCOSE SERPL-MCNC: 95 MG/DL — SIGNIFICANT CHANGE UP (ref 70–99)
GLUCOSE SERPL-MCNC: 95 MG/DL — SIGNIFICANT CHANGE UP (ref 70–99)
HCT VFR BLD CALC: 33.8 % — LOW (ref 37–47)
HCT VFR BLD CALC: 33.8 % — LOW (ref 37–47)
HGB BLD-MCNC: 11.2 G/DL — LOW (ref 12–16)
HGB BLD-MCNC: 11.2 G/DL — LOW (ref 12–16)
IMM GRANULOCYTES NFR BLD AUTO: 0.3 % — SIGNIFICANT CHANGE UP (ref 0.1–0.3)
IMM GRANULOCYTES NFR BLD AUTO: 0.3 % — SIGNIFICANT CHANGE UP (ref 0.1–0.3)
LDH SERPL L TO P-CCNC: 232 — SIGNIFICANT CHANGE UP (ref 50–242)
LDH SERPL L TO P-CCNC: 232 — SIGNIFICANT CHANGE UP (ref 50–242)
LYMPHOCYTES # BLD AUTO: 2.15 K/UL — SIGNIFICANT CHANGE UP (ref 1.2–3.4)
LYMPHOCYTES # BLD AUTO: 2.15 K/UL — SIGNIFICANT CHANGE UP (ref 1.2–3.4)
LYMPHOCYTES # BLD AUTO: 27.9 % — SIGNIFICANT CHANGE UP (ref 20.5–51.1)
LYMPHOCYTES # BLD AUTO: 27.9 % — SIGNIFICANT CHANGE UP (ref 20.5–51.1)
MAGNESIUM SERPL-MCNC: 6.3 MG/DL — CRITICAL HIGH (ref 1.8–2.4)
MAGNESIUM SERPL-MCNC: 6.3 MG/DL — CRITICAL HIGH (ref 1.8–2.4)
MCHC RBC-ENTMCNC: 29 PG — SIGNIFICANT CHANGE UP (ref 27–31)
MCHC RBC-ENTMCNC: 29 PG — SIGNIFICANT CHANGE UP (ref 27–31)
MCHC RBC-ENTMCNC: 33.1 G/DL — SIGNIFICANT CHANGE UP (ref 32–37)
MCHC RBC-ENTMCNC: 33.1 G/DL — SIGNIFICANT CHANGE UP (ref 32–37)
MCV RBC AUTO: 87.6 FL — SIGNIFICANT CHANGE UP (ref 81–99)
MCV RBC AUTO: 87.6 FL — SIGNIFICANT CHANGE UP (ref 81–99)
MONOCYTES # BLD AUTO: 0.72 K/UL — HIGH (ref 0.1–0.6)
MONOCYTES # BLD AUTO: 0.72 K/UL — HIGH (ref 0.1–0.6)
MONOCYTES NFR BLD AUTO: 9.3 % — SIGNIFICANT CHANGE UP (ref 1.7–9.3)
MONOCYTES NFR BLD AUTO: 9.3 % — SIGNIFICANT CHANGE UP (ref 1.7–9.3)
NEUTROPHILS # BLD AUTO: 4.7 K/UL — SIGNIFICANT CHANGE UP (ref 1.4–6.5)
NEUTROPHILS # BLD AUTO: 4.7 K/UL — SIGNIFICANT CHANGE UP (ref 1.4–6.5)
NEUTROPHILS NFR BLD AUTO: 60.9 % — SIGNIFICANT CHANGE UP (ref 42.2–75.2)
NEUTROPHILS NFR BLD AUTO: 60.9 % — SIGNIFICANT CHANGE UP (ref 42.2–75.2)
NRBC # BLD: 0 /100 WBCS — SIGNIFICANT CHANGE UP (ref 0–0)
NRBC # BLD: 0 /100 WBCS — SIGNIFICANT CHANGE UP (ref 0–0)
PLATELET # BLD AUTO: 132 K/UL — SIGNIFICANT CHANGE UP (ref 130–400)
PLATELET # BLD AUTO: 132 K/UL — SIGNIFICANT CHANGE UP (ref 130–400)
PMV BLD: 11.1 FL — HIGH (ref 7.4–10.4)
PMV BLD: 11.1 FL — HIGH (ref 7.4–10.4)
POTASSIUM SERPL-MCNC: 4.3 MMOL/L — SIGNIFICANT CHANGE UP (ref 3.5–5)
POTASSIUM SERPL-MCNC: 4.3 MMOL/L — SIGNIFICANT CHANGE UP (ref 3.5–5)
POTASSIUM SERPL-SCNC: 4.3 MMOL/L — SIGNIFICANT CHANGE UP (ref 3.5–5)
POTASSIUM SERPL-SCNC: 4.3 MMOL/L — SIGNIFICANT CHANGE UP (ref 3.5–5)
PROT SERPL-MCNC: 5.4 G/DL — LOW (ref 6–8)
PROT SERPL-MCNC: 5.4 G/DL — LOW (ref 6–8)
RBC # BLD: 3.86 M/UL — LOW (ref 4.2–5.4)
RBC # BLD: 3.86 M/UL — LOW (ref 4.2–5.4)
RBC # FLD: 14.9 % — HIGH (ref 11.5–14.5)
RBC # FLD: 14.9 % — HIGH (ref 11.5–14.5)
SODIUM SERPL-SCNC: 135 MMOL/L — SIGNIFICANT CHANGE UP (ref 135–146)
SODIUM SERPL-SCNC: 135 MMOL/L — SIGNIFICANT CHANGE UP (ref 135–146)
URATE SERPL-MCNC: 4.6 MG/DL — SIGNIFICANT CHANGE UP (ref 2.5–7)
URATE SERPL-MCNC: 4.6 MG/DL — SIGNIFICANT CHANGE UP (ref 2.5–7)
WBC # BLD: 7.71 K/UL — SIGNIFICANT CHANGE UP (ref 4.8–10.8)
WBC # BLD: 7.71 K/UL — SIGNIFICANT CHANGE UP (ref 4.8–10.8)
WBC # FLD AUTO: 7.71 K/UL — SIGNIFICANT CHANGE UP (ref 4.8–10.8)
WBC # FLD AUTO: 7.71 K/UL — SIGNIFICANT CHANGE UP (ref 4.8–10.8)

## 2023-11-15 RX ORDER — SENNA PLUS 8.6 MG/1
2 TABLET ORAL AT BEDTIME
Refills: 0 | Status: DISCONTINUED | OUTPATIENT
Start: 2023-11-15 | End: 2023-11-16

## 2023-11-15 RX ORDER — NIFEDIPINE 30 MG
30 TABLET, EXTENDED RELEASE 24 HR ORAL EVERY 24 HOURS
Refills: 0 | Status: DISCONTINUED | OUTPATIENT
Start: 2023-11-15 | End: 2023-11-16

## 2023-11-15 RX ADMIN — SODIUM CHLORIDE 3 MILLILITER(S): 9 INJECTION INTRAMUSCULAR; INTRAVENOUS; SUBCUTANEOUS at 14:30

## 2023-11-15 RX ADMIN — Medication 975 MILLIGRAM(S): at 15:18

## 2023-11-15 RX ADMIN — Medication 975 MILLIGRAM(S): at 16:18

## 2023-11-15 RX ADMIN — Medication 1 SPRAY(S): at 17:54

## 2023-11-15 RX ADMIN — SENNA PLUS 2 TABLET(S): 8.6 TABLET ORAL at 07:17

## 2023-11-15 RX ADMIN — Medication 600 MILLIGRAM(S): at 17:54

## 2023-11-15 RX ADMIN — Medication 600 MILLIGRAM(S): at 18:54

## 2023-11-15 RX ADMIN — Medication 600 MILLIGRAM(S): at 13:47

## 2023-11-15 RX ADMIN — Medication 975 MILLIGRAM(S): at 23:09

## 2023-11-15 RX ADMIN — Medication 600 MILLIGRAM(S): at 06:05

## 2023-11-15 RX ADMIN — Medication 600 MILLIGRAM(S): at 06:35

## 2023-11-15 RX ADMIN — Medication 650 MILLIGRAM(S): at 23:09

## 2023-11-15 RX ADMIN — SODIUM CHLORIDE 3 MILLILITER(S): 9 INJECTION INTRAMUSCULAR; INTRAVENOUS; SUBCUTANEOUS at 23:09

## 2023-11-15 RX ADMIN — Medication 600 MILLIGRAM(S): at 12:47

## 2023-11-15 RX ADMIN — Medication 975 MILLIGRAM(S): at 20:58

## 2023-11-15 NOTE — PROGRESS NOTE ADULT - SUBJECTIVE AND OBJECTIVE BOX
PGY2 Magnesium Note     Subjective:   Pt evaluated at bedside for magnesium check. She denies headache, vision changes, dizziness, SOB, chest pain, RUQ/epigastric pain.    Objective:   Vital Signs Last 24 Hrs  T(C): 36.6 (14 Nov 2023 15:12), Max: 36.8 (14 Nov 2023 11:33)  T(F): 97.8 (14 Nov 2023 15:12), Max: 98.2 (14 Nov 2023 11:33)  HR: 72 (15 Nov 2023 05:10) (68 - 110)  BP: 115/70 (15 Nov 2023 05:06) (93/56 - 187/98)  BP(mean): --  RR: 18 (15 Nov 2023 05:00) (16 - 20)  SpO2: 97% (15 Nov 2023 05:10) (89% - 99%)      Gen: NAD, AAOx3  CV: S1S2, RRR, no M/R/G  Pulm: CTAB, no rhonchi or rales or wheezing  Ext: no calf tenderness or edema SCDs in place  Neuro: 1+ DTR bilaterally  Abd: soft, nontender, no rebound or guarding, no epigastric tenderness  LOCHIA: minimal     Medications:  acetaminophen     Tablet ..: 975 (11-14-23 @ 17:45)  acetaminophen     Tablet ..: 650 (11-14-23 @ 00:01)  ampicillin  IVPB: 200 (11-13-23 @ 21:10)  ampicillin  IVPB: 108 (11-14-23 @ 09:24),  108 (11-14-23 @ 05:11),  108 (11-14-23 @ 01:07)  ibuprofen  Tablet.: 600 (11-14-23 @ 18:43)  labetalol Injectable: 20 (11-13-23 @ 19:17)  lactated ringers.: 75 (11-14-23 @ 14:45)  magnesium sulfate  IVPB: 300 (11-13-23 @ 19:53)  magnesium sulfate Infusion: 50 (11-13-23 @ 20:22),  50 (11-13-23 @ 19:28)  metoclopramide Injectable: 10 (11-14-23 @ 05:46)  misoprostol: 25 (11-13-23 @ 20:49)  oxytocin Infusion: 1000 (11-13-23 @ 18:55)  oxytocin Infusion.: 2 (11-14-23 @ 05:27)      Labs:   11/15 @0430 7.71>11.2/33.8<132        11/14 @2200 9.58>11.2/33.3<142, 131/3.5/102/23/7/0.6<126, AST/ALT 18/15, Uric acid 4.4, , Mg 6.0                        12.0   13.00 )-----------( 148      ( 14 Nov 2023 14:52 )             35.8   11-14    130<L>  |  98  |  7<L>  ----------------------------<  135<H>  4.2   |  22  |  0.6<L>    Ca    6.9<L>      14 Nov 2023 14:52  Mg     5.1     11-14    TPro  5.7<L>  /  Alb  3.3<L>  /  TBili  0.5  /  DBili  x   /  AST  17  /  ALT  15  /  AlkPhos  80  11-14      Urinalysis Basic - ( 14 Nov 2023 14:52 )    Color: x / Appearance: x / SG: x / pH: x  Gluc: 135 mg/dL / Ketone: x  / Bili: x / Urobili: x   Blood: x / Protein: x / Nitrite: x   Leuk Esterase: x / RBC: x / WBC x   Sq Epi: x / Non Sq Epi: x / Bacteria: x

## 2023-11-15 NOTE — PROGRESS NOTE ADULT - SUBJECTIVE AND OBJECTIVE BOX
PGY2 Magnesium Note     Subjective:   Pt evaluated at bedside for magnesium check. She denies headache, vision changes, dizziness, SOB, chest pain, RUQ/epigastric pain.    Objective:   Vital Signs Last 24 Hrs  T(C): 36.6 (14 Nov 2023 15:12), Max: 36.8 (14 Nov 2023 11:33)  T(F): 97.8 (14 Nov 2023 15:12), Max: 98.2 (14 Nov 2023 11:33)  HR: 74 (15 Nov 2023 00:58) (70 - 110)  BP: 107/65 (15 Nov 2023 00:51) (93/56 - 187/98)  BP(mean): --  RR: 16 (14 Nov 2023 22:58) (16 - 18)  SpO2: 97% (15 Nov 2023 00:58) (89% - 99%)    Gen: NAD, AAOx3  CV: S1S2, RRR, no M/R/G  Pulm: CTAB, no rhonchi or rales or wheezing  Ext: no calf tenderness or edema SCDs in place  Neuro: 1+ DTR bilaterally  Abd: soft, nontender, no rebound or guarding, no epigastric tenderness  LOCHIA: minimal     Medications:  acetaminophen     Tablet ..: 975 (11-14-23 @ 17:45)  acetaminophen     Tablet ..: 650 (11-14-23 @ 00:01)  ampicillin  IVPB: 200 (11-13-23 @ 21:10)  ampicillin  IVPB: 108 (11-14-23 @ 09:24),  108 (11-14-23 @ 05:11),  108 (11-14-23 @ 01:07)  ibuprofen  Tablet.: 600 (11-14-23 @ 18:43)  labetalol Injectable: 20 (11-13-23 @ 19:17)  lactated ringers.: 75 (11-14-23 @ 14:45)  magnesium sulfate  IVPB: 300 (11-13-23 @ 19:53)  magnesium sulfate Infusion: 50 (11-13-23 @ 20:22),  50 (11-13-23 @ 19:28)  metoclopramide Injectable: 10 (11-14-23 @ 05:46)  misoprostol: 25 (11-13-23 @ 20:49)  oxytocin Infusion: 1000 (11-13-23 @ 18:55)  oxytocin Infusion.: 2 (11-14-23 @ 05:27)      Labs:     @2200 9.58>11.2/33.3<142, 131/3.5/102/23/7/0.6<126, AST/ALT 18/15, Uric acid 4.4, , Mg 6.0                        12.0   13.00 )-----------( 148      ( 14 Nov 2023 14:52 )             35.8   11-14    130<L>  |  98  |  7<L>  ----------------------------<  135<H>  4.2   |  22  |  0.6<L>    Ca    6.9<L>      14 Nov 2023 14:52  Mg     5.1     11-14    TPro  5.7<L>  /  Alb  3.3<L>  /  TBili  0.5  /  DBili  x   /  AST  17  /  ALT  15  /  AlkPhos  80  11-14      Urinalysis Basic - ( 14 Nov 2023 14:52 )    Color: x / Appearance: x / SG: x / pH: x  Gluc: 135 mg/dL / Ketone: x  / Bili: x / Urobili: x   Blood: x / Protein: x / Nitrite: x   Leuk Esterase: x / RBC: x / WBC x   Sq Epi: x / Non Sq Epi: x / Bacteria: x

## 2023-11-15 NOTE — PROGRESS NOTE ADULT - ASSESSMENT
A/P: 29 y/o now P2, s/p , PPD1, h/o cHTN now with superimposed preeclampsia, s/p procardia 10mg IR, s/p labetalol 20mg IVP. On magnesium for seizure prophylaxis.   - Magnesium was turned off for 1.5 hours for bleeding s/p VD, restarted at 1230  - continue magnesium until 11/15 @1102  - next labs and magnesium level at 1030  - f/u BPs; Current BPs 122/74  - stricts I/Os  - continuous EFM and toco  - Seizure precautions  - Reevaluate in 2hours

## 2023-11-15 NOTE — PROGRESS NOTE ADULT - ASSESSMENT
A/P: 29 y/o now para 2, s/p , PPD1, h/o cHTN now with superimposed preeclampsia, s/p procardia 10mg IR, s/p labetalol 20mg IVP, s/p cytotec x1. On magnesium for seizure prophylaxis.     - Magnesium was turned off for 1.5 hours for bleeding s/p VD, restarted at 1230  - continue magnesium until 11/15 @1102  - next labs and magnesium level at 2100  - f/u BPs; Current BPs 107/65  - stricts I/Os  - continuous EFM and toco  - Seizure precautions  - Reevaluate in 2hours

## 2023-11-15 NOTE — PROGRESS NOTE ADULT - SUBJECTIVE AND OBJECTIVE BOX
PGY1 Magnesium Note     Subjective:   Pt evaluated at bedside for magnesium check. She denies headache, vision changes, dizziness, SOB, chest pain, RUQ/epigastric pain.    Objective:   Vital Signs Last 24 Hrs  T(C): 36.6 (11-14-23 @ 15:12), Max: 36.8 (11-14-23 @ 11:33)  T(F): 97.8 (11-14-23 @ 15:12), Max: 98.2 (11-14-23 @ 11:33)  HR: 75 (11-15-23 @ 06:30) (66 - 110)  BP: 122/74 (11-15-23 @ 06:21) (93/56 - 187/98)  RR: 18 (11-15-23 @ 06:06) (16 - 20)  SpO2: 98% (11-15-23 @ 06:30) (89% - 99%)      Gen: NAD, AAOx3  CV: S1S2, RRR, no M/R/G  Pulm: CTAB, no rhonchi or rales or wheezing  Ext: no calf tenderness or edema SCDs in place  Neuro: 1+ DTR bilaterally  Abd: soft, nontender, no rebound or guarding, no epigastric tenderness  LOCHIA: minimal     Medications:  acetaminophen     Tablet ..: 975 (11-14-23 @ 17:45)  acetaminophen     Tablet ..: 650 (11-14-23 @ 00:01)  ampicillin  IVPB: 200 (11-13-23 @ 21:10)  ampicillin  IVPB: 108 (11-14-23 @ 09:24),  108 (11-14-23 @ 05:11),  108 (11-14-23 @ 01:07)  ibuprofen  Tablet.: 600 (11-14-23 @ 18:43)  labetalol Injectable: 20 (11-13-23 @ 19:17)  lactated ringers.: 75 (11-14-23 @ 14:45)  magnesium sulfate  IVPB: 300 (11-13-23 @ 19:53)  magnesium sulfate Infusion: 50 (11-13-23 @ 20:22),  50 (11-13-23 @ 19:28)  metoclopramide Injectable: 10 (11-14-23 @ 05:46)  misoprostol: 25 (11-13-23 @ 20:49)  oxytocin Infusion: 1000 (11-13-23 @ 18:55)  oxytocin Infusion.: 2 (11-14-23 @ 05:27)      Labs:   11/15 @0430 7.71>11.2/33.8<132        11/14 @2200 9.58>11.2/33.3<142, 131/3.5/102/23/7/0.6<126, AST/ALT 18/15, Uric acid 4.4, , Mg 6.0                        12.0   13.00 )-----------( 148      ( 14 Nov 2023 14:52 )             35.8   11-14    130<L>  |  98  |  7<L>  ----------------------------<  135<H>  4.2   |  22  |  0.6<L>    Ca    6.9<L>      14 Nov 2023 14:52  Mg     5.1     11-14    TPro  5.7<L>  /  Alb  3.3<L>  /  TBili  0.5  /  DBili  x   /  AST  17  /  ALT  15  /  AlkPhos  80  11-14      Urinalysis Basic - ( 14 Nov 2023 14:52 )    Color: x / Appearance: x / SG: x / pH: x  Gluc: 135 mg/dL / Ketone: x  / Bili: x / Urobili: x   Blood: x / Protein: x / Nitrite: x   Leuk Esterase: x / RBC: x / WBC x   Sq Epi: x / Non Sq Epi: x / Bacteria: x

## 2023-11-16 ENCOUNTER — TRANSCRIPTION ENCOUNTER (OUTPATIENT)
Age: 28
End: 2023-11-16

## 2023-11-16 VITALS
RESPIRATION RATE: 18 BRPM | TEMPERATURE: 98 F | DIASTOLIC BLOOD PRESSURE: 86 MMHG | SYSTOLIC BLOOD PRESSURE: 141 MMHG | HEART RATE: 77 BPM

## 2023-11-16 LAB
GP B STREP DNA SPEC QL NAA+PROBE: NOT DETECTED
SOURCE GBS: NORMAL
SURGICAL PATHOLOGY STUDY: SIGNIFICANT CHANGE UP
SURGICAL PATHOLOGY STUDY: SIGNIFICANT CHANGE UP

## 2023-11-16 RX ORDER — SENNA PLUS 8.6 MG/1
2 TABLET ORAL
Qty: 0 | Refills: 0 | DISCHARGE
Start: 2023-11-16

## 2023-11-16 RX ORDER — NIFEDIPINE 30 MG
1 TABLET, EXTENDED RELEASE 24 HR ORAL
Qty: 48 | Refills: 0
Start: 2023-11-16 | End: 2024-01-02

## 2023-11-16 RX ORDER — SIMETHICONE 80 MG/1
1 TABLET, CHEWABLE ORAL
Qty: 0 | Refills: 0 | DISCHARGE
Start: 2023-11-16

## 2023-11-16 RX ORDER — MAGNESIUM HYDROXIDE 400 MG/1
30 TABLET, CHEWABLE ORAL
Qty: 0 | Refills: 0 | DISCHARGE
Start: 2023-11-16

## 2023-11-16 RX ORDER — BENZOCAINE 10 %
1 GEL (GRAM) MUCOUS MEMBRANE
Qty: 0 | Refills: 0 | DISCHARGE
Start: 2023-11-16

## 2023-11-16 RX ORDER — AER TRAVELER 0.5 G/1
1 SOLUTION RECTAL; TOPICAL
Qty: 0 | Refills: 0 | DISCHARGE
Start: 2023-11-16

## 2023-11-16 RX ORDER — ASPIRIN/CALCIUM CARB/MAGNESIUM 324 MG
1 TABLET ORAL
Refills: 0 | DISCHARGE

## 2023-11-16 RX ADMIN — Medication 975 MILLIGRAM(S): at 09:05

## 2023-11-16 RX ADMIN — Medication 600 MILLIGRAM(S): at 00:04

## 2023-11-16 RX ADMIN — Medication 30 MILLIGRAM(S): at 00:04

## 2023-11-16 RX ADMIN — Medication 600 MILLIGRAM(S): at 06:02

## 2023-11-16 RX ADMIN — Medication 600 MILLIGRAM(S): at 12:46

## 2023-11-16 RX ADMIN — Medication 975 MILLIGRAM(S): at 09:20

## 2023-11-16 RX ADMIN — Medication 600 MILLIGRAM(S): at 08:17

## 2023-11-16 NOTE — DISCHARGE NOTE OB - CARE PLAN
Principal Discharge DX:	Normal spontaneous vaginal delivery  Assessment and plan of treatment:	If you experience any of the following, please notify your provider:  -fever >100.4F  -increased vaginal bleeding or clotting (saturating a pad an hour)  -foul smelling discharge or bloody discharge from your incision site  -severe abdominal, vaginal, or rectal pain   -persistent headache or vision changes  -swollen areas on your legs that are red, hot, or painful   -swollen, hot, painful areas and/or streaks on your breasts  -cracked or bleeding nipples  -mood swings, depression, or crying spells lasting more than 3 days     Nothing in the vagina for 6 weeks. No intercourse for 6 weeks. No bath tubs, swimming pools, or hot tubs for 6 weeks.  Secondary Diagnosis:	Severe preeclampsia  Assessment and plan of treatment:	Check your blood pressure every day in the morning and write down the blood pressures. Take your Procardia every day. If you have headache that does not improve with Tyleno/Motrin, changes in vision, shortness of breath, chest pain, epigastric pain, blood pressure >160/110, please call your physician or return to labor and delivery.   1

## 2023-11-16 NOTE — DISCHARGE NOTE OB - HOSPITAL COURSE
Patient presented to the hospital and was diagnosed with preeclampsia with severe features. She got magnesium for seizure prophylaxis and was induced. She received magnesium for 24 hours after delivery. She was started on Procardia for persistently elevated blood pressures. Discharged prateek in stable condition.

## 2023-11-16 NOTE — DISCHARGE NOTE OB - PLAN OF CARE
If you experience any of the following, please notify your provider:  -fever >100.4F  -increased vaginal bleeding or clotting (saturating a pad an hour)  -foul smelling discharge or bloody discharge from your incision site  -severe abdominal, vaginal, or rectal pain   -persistent headache or vision changes  -swollen areas on your legs that are red, hot, or painful   -swollen, hot, painful areas and/or streaks on your breasts  -cracked or bleeding nipples  -mood swings, depression, or crying spells lasting more than 3 days     Nothing in the vagina for 6 weeks. No intercourse for 6 weeks. No bath tubs, swimming pools, or hot tubs for 6 weeks. Check your blood pressure every day in the morning and write down the blood pressures. Take your Procardia every day. If you have headache that does not improve with Tyleno/Motrin, changes in vision, shortness of breath, chest pain, epigastric pain, blood pressure >160/110, please call your physician or return to labor and delivery.

## 2023-11-16 NOTE — DISCHARGE NOTE OB - PATIENT PORTAL LINK FT
You can access the FollowMyHealth Patient Portal offered by [facility] by registering at the following website: [email or web address]. By joining [employer]’s FollowMyHealth portal, you will also be able to view your health information using other applications (apps) compatible with our system.

## 2023-11-16 NOTE — DISCHARGE NOTE OB - CARE PROVIDER_API CALL
Lisa Gallagher  Obstetrics and Gynecology  [address] [place] [address]  Phone: [phone]  Fax: [phone]  Follow Up Time:

## 2023-11-16 NOTE — DISCHARGE NOTE OB - ADDITIONAL INSTRUCTIONS
Please schedule an appointment to see your physician in one week for blood pressure check and six weeks for regular postpartum check.

## 2023-11-16 NOTE — DISCHARGE NOTE OB - NS MD DC FALL RISK RISK
For information on Fall & Injury Prevention, visit: [email or web address] OR  [email or web address] OR  https://www.cdc.gov/steadi/patient.html

## 2023-11-16 NOTE — DISCHARGE NOTE OB - MEDICATION SUMMARY - MEDICATIONS TO TAKE
I will START or STAY ON the medications listed below when I get home from the hospital:    ibuprofen 600 mg oral tablet  -- 1 tab(s) by mouth every 6 hours  -- Indication: For pain    acetaminophen 325 mg oral tablet  -- 3 tab(s) by mouth every 6 hours  -- Indication: For pain    magnesium hydroxide 8% oral suspension  -- 30 milliliter(s) by mouth 2 times a day As needed Constipation  -- Indication: For constipation    NIFEdipine 30 mg oral tablet, extended release  -- 1 tab(s) by mouth once a day  -- Indication: For blood pressure    benzocaine 20% topical spray  -- 1 Apply on skin to affected area every 6 hours As needed for Perineal discomfort  -- Indication: For perineal pain    witch hazel 50% topical pad  -- 1 Apply on skin to affected area every 4 hours As needed Perineal discomfort  -- Indication: For perineal pain    Prenatal Multivitamins with Folic Acid 1 mg oral tablet  -- 1 tab(s) by mouth once a day  -- Indication: For vitamins    senna leaf extract oral tablet  -- 2 tab(s) by mouth once a day (at bedtime)  -- Indication: For constipation    simethicone 80 mg oral tablet, chewable  -- 1 tab(s) by mouth every 4 hours As needed Gas  -- Indication: For gas pain

## 2023-11-18 DIAGNOSIS — O99.810 ABNORMAL GLUCOSE COMPLICATING PREGNANCY: ICD-10-CM

## 2023-11-18 DIAGNOSIS — O09.90 SUPERVISION OF HIGH RISK PREGNANCY, UNSPECIFIED, UNSPECIFIED TRIMESTER: ICD-10-CM

## 2023-11-18 DIAGNOSIS — O31.10X0 CONTINUING PREGNANCY AFTER SPONTANEOUS ABORTION OF ONE FETUS OR MORE, UNSPECIFIED TRIMESTER, NOT APPLICABLE OR UNSPECIFIED: ICD-10-CM

## 2023-11-18 DIAGNOSIS — O16.9 UNSPECIFIED MATERNAL HYPERTENSION, UNSPECIFIED TRIMESTER: ICD-10-CM

## 2023-11-18 DIAGNOSIS — Z3A.33 33 WEEKS GESTATION OF PREGNANCY: ICD-10-CM

## 2023-11-20 ENCOUNTER — APPOINTMENT (OUTPATIENT)
Dept: ANTEPARTUM | Facility: CLINIC | Age: 28
End: 2023-11-20

## 2023-11-20 ENCOUNTER — APPOINTMENT (OUTPATIENT)
Dept: MATERNAL FETAL MEDICINE | Facility: CLINIC | Age: 28
End: 2023-11-20

## 2023-11-20 ENCOUNTER — APPOINTMENT (OUTPATIENT)
Dept: OBGYN | Facility: CLINIC | Age: 28
End: 2023-11-20

## 2023-11-20 DIAGNOSIS — Z3A.36 36 WEEKS GESTATION OF PREGNANCY: ICD-10-CM

## 2023-11-20 DIAGNOSIS — Z28.09 IMMUNIZATION NOT CARRIED OUT BECAUSE OF OTHER CONTRAINDICATION: ICD-10-CM

## 2023-11-22 ENCOUNTER — OUTPATIENT (OUTPATIENT)
Dept: INPATIENT UNIT | Facility: HOSPITAL | Age: 28
LOS: 1 days | Discharge: HOME | End: 2023-11-22
Payer: COMMERCIAL

## 2023-11-22 VITALS — OXYGEN SATURATION: 98 %

## 2023-11-22 VITALS — OXYGEN SATURATION: 96 % | HEART RATE: 77 BPM

## 2023-11-22 DIAGNOSIS — O26.899 OTHER SPECIFIED PREGNANCY RELATED CONDITIONS, UNSPECIFIED TRIMESTER: ICD-10-CM

## 2023-11-22 DIAGNOSIS — K40.20 BILATERAL INGUINAL HERNIA, WITHOUT OBSTRUCTION OR GANGRENE, NOT SPECIFIED AS RECURRENT: Chronic | ICD-10-CM

## 2023-11-22 PROCEDURE — 99214 OFFICE O/P EST MOD 30 MIN: CPT

## 2023-11-22 PROCEDURE — G0378: CPT

## 2023-11-22 RX ORDER — NIFEDIPINE 30 MG
30 TABLET, EXTENDED RELEASE 24 HR ORAL ONCE
Refills: 0 | Status: DISCONTINUED | OUTPATIENT
Start: 2023-11-22 | End: 2023-11-22

## 2023-11-22 RX ORDER — NIFEDIPINE 30 MG
30 TABLET, EXTENDED RELEASE 24 HR ORAL ONCE
Refills: 0 | Status: COMPLETED | OUTPATIENT
Start: 2023-11-22 | End: 2023-11-22

## 2023-11-22 RX ADMIN — Medication 30 MILLIGRAM(S): at 22:48

## 2023-11-22 NOTE — CONSULT NOTE ADULT - SUBJECTIVE AND OBJECTIVE BOX
HPI:  29 yo P2, s/p  23 at 37w0d, PPD#8 presents for evaluation of headache. Pregnancy complicated by chronic hypertension with superimposed preeclampsia. Patient s/p magnesium treatment. Patient discharged home on Procardia 30XL daily. She was increased to Procardia 30XL BID outpatient due to uncontrolled BPs. She was increased to Procardia 60XL in AM and Procardia 30XL in PM today. She took Procardia 60XL today at 0900. Patient reports on discharged she had a mild frontal headache 5/10 intensity. Reports her headache never fully resolved and has been fluctuating. Reports 1 day in the last week when she had no headache while she was on bed rest. Tylenol and motrin have not alleviated her discomfort. Reports her headache resolved when laying down at night and she is able to sleep through the night. Her headache gets worse when she stands and walks around. She denies any blurry vision, slurred speech, neck stiffness, confusion, tingling/numbness, difficulty walking.   PMH notable for H/O spont  once, H/O OCP intake for 11 years due to PCOS  FHx notable for father having stroke in his late 30s due to migraine. He  had his 2nd stroke at the age of 57.     PAST MEDICAL & SURGICAL HISTORY:  PCOS (polycystic ovarian syndrome)  Chronic hypertension  Bilateral inguinal hernia  repaired        Medications:  NIFEdipine XL 30 milliGRAM(s) Oral once      Vital Signs Last 24 Hrs  T(C): --  T(F): --  HR: 85 (2023 22:12) (74 - 115)  BP: 129/92 (2023 22:08) (121/84 - 156/101)  BP(mean): --  RR: 20 (2023 17:10) (20 - 20)  SpO2: 98% (2023 22:07) (92% - 98%)    Parameters below as of 2023 17:10  Patient On (Oxygen Delivery Method): room air        Neurological Exam:   Mental status: Awake, alert and oriented x3.  Recent and remote memory intact.  Naming, repetition and comprehension intact.  Attention/concentration intact.  No dysarthria, no aphasia.  Fund of knowledge appropriate.    Cranial nerves: Pupils equally round and reactive to light, visual fields full, no nystagmus, extraocular muscles intact, V1 through V3 intact bilaterally and symmetric, face symmetric, hearing intact to finger rub, palate elevation symmetric, tongue was midline.  Motor:  MRC grading 5/5 b/l UE/LE.   strength 5/5.  Normal tone and bulk.  No abnormal movements.    Sensation: Intact to light touch, proprioception, and vibration  Coordination: No dysmetria on finger-to-nose and heel-to-shin.    Reflexes: 2+ in bilateral UE/LE, downgoing toes bilaterally.   Gait: Narrow and steady. No ataxia.  Romberg negative

## 2023-11-22 NOTE — OB PROVIDER TRIAGE NOTE - NSOBPROVIDERNOTE_OBGYN_ALL_OB_FT
27 yo P2, s/p  PPD#8, with chronic hypertension with superimposed preeclampsia, s/p magnesium, with persistent headache r/o postdural puncture headache vs elevated ICP  -anesthesia consult  -head CT per anesthesia  -f/u CBC  -expectant management    Dr Gallagher aware 27 yo P2, s/p  PPD#8, with chronic hypertension with superimposed preeclampsia, s/p magnesium, with persistent headache r/o postdural puncture headache vs elevated ICP  -anesthesia consult  -head CT per anesthesia  -f/u CBC  -expectant management    Dr Gallagher aware    ADDENDUM: Patient evaluated at bedside and continues to report same headache intensity, unchanged from presentation. Discussed neurology recommendations to do MRA and MRV, however will not be able to be done until tomorrow. Patient wishes to do MR imaging outpatient instead. Discussed risks of prolonged headache of unknown etiology. Patient voiced understanding. Will continue Procardia regimen and will add fioricet for headache. Patient knows to contact PMD office to arrange for outpatient MRA and MRV head.      aware

## 2023-11-22 NOTE — OB PROVIDER TRIAGE NOTE - HISTORY OF PRESENT ILLNESS
29 yo P2, s/p  23 at 37w0d, presents for evaluation of headache and possible blood patch by anesthesia. Pregnancy complicated by chronic hypertension with superimposed preeclampsia. Patient s/p magnesium treatment. Patient discharged home on    27 yo P2, s/p  23 at 37w0d, PPD#8 presents for evaluation of headache and possible blood patch by anesthesia. Pregnancy complicated by chronic hypertension with superimposed preeclampsia. Patient s/p magnesium treatment. Patient discharged home on Procardia 30XL daily. She was increased to Procardia 30XL BID outpatient due to uncontrolled BPs. She was increased to Procardia 60XL in AM and Procardia 30XL in PM today. She took Procardia 60XL today at 0900. Patient reports on discharged she had a mild frontal headache 5/10 intensity. Reports her headache never fully resolved and has been fluctuating. Reports 1 day in the last week when she had no headache while she was on bed rest. Tylenol and motrin have not alleviated her discomfort. Reports her headache resolved when laying down at night and she is able to sleep through the night. Currently reports her headache is 8/10. Denies visual changes, chest pain, shortness of breath, RUQ pain/tenderness, LE pain. Denies other significant past medical history.

## 2023-11-22 NOTE — OB PROVIDER TRIAGE NOTE - NSHPPHYSICALEXAM_GEN_ALL_CORE
Physical exam:    Vital Signs Last 24 Hrs  HR: 95 (22 Nov 2023 17:16) (84 - 95)  BP: 156/101 (22 Nov 2023 17:10) (156/101 - 156/101)  RR: 20 (22 Nov 2023 17:10) (20 - 20)  SpO2: 98% (22 Nov 2023 17:16) (98% - 98%)    Gen: AAOx3  Abdomen: soft, nongravid, nontender

## 2023-11-22 NOTE — ANESTHESIA FOLLOW-UP NOTE - NSEVALATIONFT_GEN_ALL_CORE
patient returning for Headache  Upon obtaining HPI shes states 8/10 pain which is Temporal- frontal, nuchal with some auditory changes- She states worsened day after delivery but she also had headaches throughout labor because of magnesium. Headaches have not improved and conservative treatments like fluids, caffeine and Tylenol have not helped at all.    Initially she stated positional nature of headaches but upon attempting sphenopalatine block and lying patient supine she began c/o a moderate headache.   Headache is not positively positional to warrant epidural blood patch at this time. Confounding factors include her current uncontrolled blood pressures.

## 2023-11-22 NOTE — CONSULT NOTE ADULT - ASSESSMENT
Ms. Wang is a 28 year old postpartum female with H/O preeclampsia and HTN is being evaluated for headache starting following her NVD with epidural. The positional component and lack of other red sign is consistent with CSF leak headache but in view of her being post-partum and family Hx, would rule out any structural cause including venous sinus thrombosis.    Recommendations:  - Please obtain MRI brain w/o contrast, MR angio w/o contrast and MR venogram with contrast.  - Ensure adequate hydration: Normal saline @100cc/hr.  - Can givel TRrial of Fioricet for headache.  - IF scans are negative and headache persists , consider blood patch.      Case discussed with Neurology attending

## 2023-11-22 NOTE — OB RN TRIAGE NOTE - NS_OBGYNHISTORY_OBGYN_ALL_OB_FT
OB Hx:  1 sAB, no d&c    Gyn Hx:  H/o PCOS, previously treated with OCPs.  Denies h/o fibroids, STIs, or abnormal pap smears

## 2023-11-22 NOTE — OB RN TRIAGE NOTE - NSOBDISCHARGEVS_OBGYN_ALL_OB
Conjuntivae and eyelids appear normal , Sclerae : White without injection Confirmed that patient received an additional set of vital signs prior to discharge.

## 2023-11-23 ENCOUNTER — EMERGENCY (EMERGENCY)
Facility: HOSPITAL | Age: 28
LOS: 0 days | Discharge: HOME | End: 2023-11-23
Attending: STUDENT IN AN ORGANIZED HEALTH CARE EDUCATION/TRAINING PROGRAM
Payer: COMMERCIAL

## 2023-11-23 ENCOUNTER — INPATIENT (INPATIENT)
Facility: HOSPITAL | Age: 28
LOS: 0 days | Discharge: HOME | DRG: 776 | End: 2023-11-24
Attending: PSYCHIATRY & NEUROLOGY | Admitting: OBSTETRICS & GYNECOLOGY
Payer: COMMERCIAL

## 2023-11-23 VITALS
RESPIRATION RATE: 18 BRPM | OXYGEN SATURATION: 99 % | WEIGHT: 199.96 LBS | HEIGHT: 69 IN | DIASTOLIC BLOOD PRESSURE: 105 MMHG | TEMPERATURE: 98 F | HEART RATE: 83 BPM | SYSTOLIC BLOOD PRESSURE: 156 MMHG

## 2023-11-23 VITALS — HEART RATE: 75 BPM | SYSTOLIC BLOOD PRESSURE: 147 MMHG | DIASTOLIC BLOOD PRESSURE: 95 MMHG | RESPIRATION RATE: 18 BRPM

## 2023-11-23 DIAGNOSIS — K40.20 BILATERAL INGUINAL HERNIA, WITHOUT OBSTRUCTION OR GANGRENE, NOT SPECIFIED AS RECURRENT: Chronic | ICD-10-CM

## 2023-11-23 DIAGNOSIS — O26.893 OTHER SPECIFIED PREGNANCY RELATED CONDITIONS, THIRD TRIMESTER: ICD-10-CM

## 2023-11-23 DIAGNOSIS — O26.899 OTHER SPECIFIED PREGNANCY RELATED CONDITIONS, UNSPECIFIED TRIMESTER: ICD-10-CM

## 2023-11-23 LAB
ALBUMIN SERPL ELPH-MCNC: 4.5 G/DL — SIGNIFICANT CHANGE UP (ref 3.5–5.2)
ALBUMIN SERPL ELPH-MCNC: 4.5 G/DL — SIGNIFICANT CHANGE UP (ref 3.5–5.2)
ALP SERPL-CCNC: 84 U/L — SIGNIFICANT CHANGE UP (ref 30–115)
ALP SERPL-CCNC: 84 U/L — SIGNIFICANT CHANGE UP (ref 30–115)
ALT FLD-CCNC: 38 U/L — SIGNIFICANT CHANGE UP (ref 0–41)
ALT FLD-CCNC: 38 U/L — SIGNIFICANT CHANGE UP (ref 0–41)
ANION GAP SERPL CALC-SCNC: 11 MMOL/L — SIGNIFICANT CHANGE UP (ref 7–14)
ANION GAP SERPL CALC-SCNC: 11 MMOL/L — SIGNIFICANT CHANGE UP (ref 7–14)
AST SERPL-CCNC: 37 U/L — SIGNIFICANT CHANGE UP (ref 0–41)
AST SERPL-CCNC: 37 U/L — SIGNIFICANT CHANGE UP (ref 0–41)
BASOPHILS # BLD AUTO: 0.07 K/UL — SIGNIFICANT CHANGE UP (ref 0–0.2)
BASOPHILS # BLD AUTO: 0.07 K/UL — SIGNIFICANT CHANGE UP (ref 0–0.2)
BASOPHILS NFR BLD AUTO: 0.9 % — SIGNIFICANT CHANGE UP (ref 0–1)
BASOPHILS NFR BLD AUTO: 0.9 % — SIGNIFICANT CHANGE UP (ref 0–1)
BILIRUB SERPL-MCNC: 0.7 MG/DL — SIGNIFICANT CHANGE UP (ref 0.2–1.2)
BILIRUB SERPL-MCNC: 0.7 MG/DL — SIGNIFICANT CHANGE UP (ref 0.2–1.2)
BUN SERPL-MCNC: 9 MG/DL — LOW (ref 10–20)
BUN SERPL-MCNC: 9 MG/DL — LOW (ref 10–20)
CALCIUM SERPL-MCNC: 10.2 MG/DL — SIGNIFICANT CHANGE UP (ref 8.4–10.5)
CALCIUM SERPL-MCNC: 10.2 MG/DL — SIGNIFICANT CHANGE UP (ref 8.4–10.5)
CHLORIDE SERPL-SCNC: 100 MMOL/L — SIGNIFICANT CHANGE UP (ref 98–110)
CHLORIDE SERPL-SCNC: 100 MMOL/L — SIGNIFICANT CHANGE UP (ref 98–110)
CO2 SERPL-SCNC: 26 MMOL/L — SIGNIFICANT CHANGE UP (ref 17–32)
CO2 SERPL-SCNC: 26 MMOL/L — SIGNIFICANT CHANGE UP (ref 17–32)
CREAT SERPL-MCNC: 0.8 MG/DL — SIGNIFICANT CHANGE UP (ref 0.7–1.5)
CREAT SERPL-MCNC: 0.8 MG/DL — SIGNIFICANT CHANGE UP (ref 0.7–1.5)
EGFR: 102 ML/MIN/1.73M2 — SIGNIFICANT CHANGE UP
EGFR: 102 ML/MIN/1.73M2 — SIGNIFICANT CHANGE UP
EOSINOPHIL # BLD AUTO: 0.11 K/UL — SIGNIFICANT CHANGE UP (ref 0–0.7)
EOSINOPHIL # BLD AUTO: 0.11 K/UL — SIGNIFICANT CHANGE UP (ref 0–0.7)
EOSINOPHIL NFR BLD AUTO: 1.5 % — SIGNIFICANT CHANGE UP (ref 0–8)
EOSINOPHIL NFR BLD AUTO: 1.5 % — SIGNIFICANT CHANGE UP (ref 0–8)
GLUCOSE SERPL-MCNC: 89 MG/DL — SIGNIFICANT CHANGE UP (ref 70–99)
GLUCOSE SERPL-MCNC: 89 MG/DL — SIGNIFICANT CHANGE UP (ref 70–99)
HCT VFR BLD CALC: 46 % — SIGNIFICANT CHANGE UP (ref 37–47)
HCT VFR BLD CALC: 46 % — SIGNIFICANT CHANGE UP (ref 37–47)
HGB BLD-MCNC: 15.1 G/DL — SIGNIFICANT CHANGE UP (ref 12–16)
HGB BLD-MCNC: 15.1 G/DL — SIGNIFICANT CHANGE UP (ref 12–16)
IMM GRANULOCYTES NFR BLD AUTO: 0.7 % — HIGH (ref 0.1–0.3)
IMM GRANULOCYTES NFR BLD AUTO: 0.7 % — HIGH (ref 0.1–0.3)
LYMPHOCYTES # BLD AUTO: 2.04 K/UL — SIGNIFICANT CHANGE UP (ref 1.2–3.4)
LYMPHOCYTES # BLD AUTO: 2.04 K/UL — SIGNIFICANT CHANGE UP (ref 1.2–3.4)
LYMPHOCYTES # BLD AUTO: 26.9 % — SIGNIFICANT CHANGE UP (ref 20.5–51.1)
LYMPHOCYTES # BLD AUTO: 26.9 % — SIGNIFICANT CHANGE UP (ref 20.5–51.1)
MAGNESIUM SERPL-MCNC: 2.3 MG/DL — SIGNIFICANT CHANGE UP (ref 1.8–2.4)
MAGNESIUM SERPL-MCNC: 2.3 MG/DL — SIGNIFICANT CHANGE UP (ref 1.8–2.4)
MCHC RBC-ENTMCNC: 28.6 PG — SIGNIFICANT CHANGE UP (ref 27–31)
MCHC RBC-ENTMCNC: 28.6 PG — SIGNIFICANT CHANGE UP (ref 27–31)
MCHC RBC-ENTMCNC: 32.8 G/DL — SIGNIFICANT CHANGE UP (ref 32–37)
MCHC RBC-ENTMCNC: 32.8 G/DL — SIGNIFICANT CHANGE UP (ref 32–37)
MCV RBC AUTO: 87.1 FL — SIGNIFICANT CHANGE UP (ref 81–99)
MCV RBC AUTO: 87.1 FL — SIGNIFICANT CHANGE UP (ref 81–99)
MONOCYTES # BLD AUTO: 0.77 K/UL — HIGH (ref 0.1–0.6)
MONOCYTES # BLD AUTO: 0.77 K/UL — HIGH (ref 0.1–0.6)
MONOCYTES NFR BLD AUTO: 10.2 % — HIGH (ref 1.7–9.3)
MONOCYTES NFR BLD AUTO: 10.2 % — HIGH (ref 1.7–9.3)
NEUTROPHILS # BLD AUTO: 4.53 K/UL — SIGNIFICANT CHANGE UP (ref 1.4–6.5)
NEUTROPHILS # BLD AUTO: 4.53 K/UL — SIGNIFICANT CHANGE UP (ref 1.4–6.5)
NEUTROPHILS NFR BLD AUTO: 59.8 % — SIGNIFICANT CHANGE UP (ref 42.2–75.2)
NEUTROPHILS NFR BLD AUTO: 59.8 % — SIGNIFICANT CHANGE UP (ref 42.2–75.2)
NRBC # BLD: 0 /100 WBCS — SIGNIFICANT CHANGE UP (ref 0–0)
NRBC # BLD: 0 /100 WBCS — SIGNIFICANT CHANGE UP (ref 0–0)
PLATELET # BLD AUTO: 303 K/UL — SIGNIFICANT CHANGE UP (ref 130–400)
PLATELET # BLD AUTO: 303 K/UL — SIGNIFICANT CHANGE UP (ref 130–400)
PMV BLD: 10 FL — SIGNIFICANT CHANGE UP (ref 7.4–10.4)
PMV BLD: 10 FL — SIGNIFICANT CHANGE UP (ref 7.4–10.4)
POTASSIUM SERPL-MCNC: 5.1 MMOL/L — HIGH (ref 3.5–5)
POTASSIUM SERPL-MCNC: 5.1 MMOL/L — HIGH (ref 3.5–5)
POTASSIUM SERPL-SCNC: 5.1 MMOL/L — HIGH (ref 3.5–5)
POTASSIUM SERPL-SCNC: 5.1 MMOL/L — HIGH (ref 3.5–5)
PROT SERPL-MCNC: 8.6 G/DL — HIGH (ref 6–8)
PROT SERPL-MCNC: 8.6 G/DL — HIGH (ref 6–8)
RBC # BLD: 5.28 M/UL — SIGNIFICANT CHANGE UP (ref 4.2–5.4)
RBC # BLD: 5.28 M/UL — SIGNIFICANT CHANGE UP (ref 4.2–5.4)
RBC # FLD: 14.1 % — SIGNIFICANT CHANGE UP (ref 11.5–14.5)
RBC # FLD: 14.1 % — SIGNIFICANT CHANGE UP (ref 11.5–14.5)
SODIUM SERPL-SCNC: 137 MMOL/L — SIGNIFICANT CHANGE UP (ref 135–146)
SODIUM SERPL-SCNC: 137 MMOL/L — SIGNIFICANT CHANGE UP (ref 135–146)
WBC # BLD: 7.57 K/UL — SIGNIFICANT CHANGE UP (ref 4.8–10.8)
WBC # BLD: 7.57 K/UL — SIGNIFICANT CHANGE UP (ref 4.8–10.8)
WBC # FLD AUTO: 7.57 K/UL — SIGNIFICANT CHANGE UP (ref 4.8–10.8)
WBC # FLD AUTO: 7.57 K/UL — SIGNIFICANT CHANGE UP (ref 4.8–10.8)

## 2023-11-23 PROCEDURE — 85300 ANTITHROMBIN III ACTIVITY: CPT

## 2023-11-23 PROCEDURE — 86235 NUCLEAR ANTIGEN ANTIBODY: CPT

## 2023-11-23 PROCEDURE — 97162 PT EVAL MOD COMPLEX 30 MIN: CPT | Mod: GP

## 2023-11-23 PROCEDURE — 85307 ASSAY ACTIVATED PROTEIN C: CPT

## 2023-11-23 PROCEDURE — A9579: CPT

## 2023-11-23 PROCEDURE — 80048 BASIC METABOLIC PNL TOTAL CA: CPT

## 2023-11-23 PROCEDURE — 85652 RBC SED RATE AUTOMATED: CPT

## 2023-11-23 PROCEDURE — 81240 F2 GENE: CPT

## 2023-11-23 PROCEDURE — 80061 LIPID PANEL: CPT

## 2023-11-23 PROCEDURE — 83090 ASSAY OF HOMOCYSTEINE: CPT

## 2023-11-23 PROCEDURE — 86038 ANTINUCLEAR ANTIBODIES: CPT

## 2023-11-23 PROCEDURE — 99285 EMERGENCY DEPT VISIT HI MDM: CPT

## 2023-11-23 PROCEDURE — 70546 MR ANGIOGRAPH HEAD W/O&W/DYE: CPT | Mod: MA

## 2023-11-23 PROCEDURE — 70545 MR ANGIOGRAPHY HEAD W/DYE: CPT | Mod: 26,MA

## 2023-11-23 PROCEDURE — 85730 THROMBOPLASTIN TIME PARTIAL: CPT

## 2023-11-23 PROCEDURE — 36415 COLL VENOUS BLD VENIPUNCTURE: CPT

## 2023-11-23 PROCEDURE — 85613 RUSSELL VIPER VENOM DILUTED: CPT

## 2023-11-23 PROCEDURE — 85303 CLOT INHIBIT PROT C ACTIVITY: CPT

## 2023-11-23 PROCEDURE — 70544 MR ANGIOGRAPHY HEAD W/O DYE: CPT

## 2023-11-23 PROCEDURE — 92610 EVALUATE SWALLOWING FUNCTION: CPT | Mod: GN

## 2023-11-23 PROCEDURE — 85306 CLOT INHIBIT PROT S FREE: CPT

## 2023-11-23 PROCEDURE — 86147 CARDIOLIPIN ANTIBODY EA IG: CPT

## 2023-11-23 PROCEDURE — 70544 MR ANGIOGRAPHY HEAD W/O DYE: CPT | Mod: 26,59

## 2023-11-23 PROCEDURE — 80053 COMPREHEN METABOLIC PANEL: CPT

## 2023-11-23 PROCEDURE — 83735 ASSAY OF MAGNESIUM: CPT

## 2023-11-23 PROCEDURE — 70553 MRI BRAIN STEM W/O & W/DYE: CPT

## 2023-11-23 PROCEDURE — 86146 BETA-2 GLYCOPROTEIN ANTIBODY: CPT

## 2023-11-23 PROCEDURE — 97165 OT EVAL LOW COMPLEX 30 MIN: CPT | Mod: GO

## 2023-11-23 PROCEDURE — 86225 DNA ANTIBODY NATIVE: CPT

## 2023-11-23 PROCEDURE — 86036 ANCA SCREEN EACH ANTIBODY: CPT

## 2023-11-23 PROCEDURE — 85025 COMPLETE CBC W/AUTO DIFF WBC: CPT

## 2023-11-23 RX ORDER — ENOXAPARIN SODIUM 100 MG/ML
90 INJECTION SUBCUTANEOUS EVERY 12 HOURS
Refills: 0 | Status: DISCONTINUED | OUTPATIENT
Start: 2023-11-23 | End: 2023-11-24

## 2023-11-23 RX ORDER — METOCLOPRAMIDE HCL 10 MG
10 TABLET ORAL ONCE
Refills: 0 | Status: COMPLETED | OUTPATIENT
Start: 2023-11-23 | End: 2023-11-23

## 2023-11-23 RX ORDER — NIFEDIPINE 30 MG
60 TABLET, EXTENDED RELEASE 24 HR ORAL EVERY 24 HOURS
Refills: 0 | Status: DISCONTINUED | OUTPATIENT
Start: 2023-11-24 | End: 2023-11-24

## 2023-11-23 RX ORDER — SODIUM CHLORIDE 9 MG/ML
1000 INJECTION, SOLUTION INTRAVENOUS ONCE
Refills: 0 | Status: COMPLETED | OUTPATIENT
Start: 2023-11-23 | End: 2023-11-23

## 2023-11-23 RX ORDER — NIFEDIPINE 30 MG
30 TABLET, EXTENDED RELEASE 24 HR ORAL AT BEDTIME
Refills: 0 | Status: DISCONTINUED | OUTPATIENT
Start: 2023-11-23 | End: 2023-11-24

## 2023-11-23 RX ORDER — ACETAMINOPHEN 500 MG
650 TABLET ORAL ONCE
Refills: 0 | Status: COMPLETED | OUTPATIENT
Start: 2023-11-23 | End: 2023-11-23

## 2023-11-23 RX ADMIN — SODIUM CHLORIDE 1000 MILLILITER(S): 9 INJECTION, SOLUTION INTRAVENOUS at 09:41

## 2023-11-23 RX ADMIN — Medication 30 MILLIGRAM(S): at 20:21

## 2023-11-23 RX ADMIN — Medication 1 CAPSULE(S): at 11:00

## 2023-11-23 RX ADMIN — Medication 10 MILLIGRAM(S): at 09:41

## 2023-11-23 RX ADMIN — Medication 1 CAPSULE(S): at 16:51

## 2023-11-23 RX ADMIN — Medication 650 MILLIGRAM(S): at 09:41

## 2023-11-23 NOTE — ED ADULT NURSE NOTE - NSFALLUNIVINTERV_ED_ALL_ED
Bed/Stretcher in lowest position, wheels locked, appropriate side rails in place/Call bell, personal items and telephone in reach/Instruct patient to call for assistance before getting out of bed/chair/stretcher/Non-slip footwear applied when patient is off stretcher/[place] to call system/Physically safe environment - no spills, clutter or unnecessary equipment/Purposeful proactive rounding/Room/bathroom lighting operational, light cord in reach

## 2023-11-23 NOTE — CONSULT NOTE ADULT - ASSESSMENT
Ms. Wang is a 28 year old postpartum female with H/O preeclampsia and HTN is being evaluated for headache starting following her NVD with epidural. The positional component and lack of other red sign is consistent with CSF leak headache; however, because of being post-partum and FHx of stroke in early age, MRV, MRA were done that showed possible a thrombosed cortical vein; in addition, abnormal increased signal measuring 7 mm is present within the extra-axial lateral aspect of the left cerebellum, which is not clear what it represent    Recommendations:  - MR brain w/wo to better decide about the nature of these lesions        Thank you for sharing this patient with me; please do not hesitate to contact me in case of any question.        Ms. Wang is a 28 year old postpartum female with H/O preeclampsia and HTN is being evaluated for headache starting following her NVD with epidural. The positional component and lack of other red sign is consistent with CSF leak headache; however, because of being post-partum and FHx of stroke in early age, MRV, MRA were done that showed possible a thrombosed cortical vein; in addition, abnormal increased signal measuring 7 mm is present within the extra-axial lateral aspect of the left cerebellum, which is not clear what it represent    Recommendations:  - MR brain w/wo to better decide about the nature of these lesions  - Start enoxaparin 1 mg/kg bid   - Q4H Neuro check   - Admit to stroke unite   - Hypercoagulable profile           Thank you for sharing this patient with me; please do not hesitate to contact me in case of any question.

## 2023-11-23 NOTE — H&P ADULT - NSHPREVIEWOFSYSTEMS_GEN_ALL_CORE
ROS:      CONSTITUTIONAL: Negative for unintentional weight loss, fever, chills, weakness or fatigue.     HEENT:  Eyes:  No visual loss, blurred vision, double vision or yellow sclerae. Ears, Nose, Throat:  No hearing loss, sneezing, congestion, runny nose or sore throat.     SKIN:  No rash or itching.     CARDIOVASCULAR:  No chest pain, chest pressure or chest discomfort. No palpitations or edema.     RESPIRATORY:  No shortness of breath, cough or sputum.     GASTROINTESTINAL:  No anorexia, nausea, vomiting or diarrhea. No abdominal pain or blood.     GENITOURINARY: No Burning on urination.      NEUROLOGICAL:  see HPI     MUSCULOSKELETAL:  No muscle, back pain, joint pain or stiffness.     HEMATOLOGIC:  No anemia, bleeding or bruising.     LYMPHATICS:  No enlarged nodes. No history of splenectomy.     PSYCHIATRIC:  No history of depression or anxiety.      ENDOCRINOLOGIC:  No reports of sweating, cold or heat intolerance. No polyuria or polydipsia.     ALLERGIES:  No history of asthma, hives, eczema or rhinitis.

## 2023-11-23 NOTE — OB PROVIDER TRIAGE NOTE - HISTORY OF PRESENT ILLNESS
27 yo P2, s/p  23 at 37w0d, PPD#9 presents for evaluation for persistent headache. Patient was evaluated on labor and delivery last night, imaging was recommended however given that her headache had improved with Fioricet, patient elected to have imaging done outpatient. Patient however was unable to  Fioricet from the pharmacy and her headache came back.  Pregnancy was complicated by chronic hypertension with superimposed preeclampsia. Patient s/p magnesium treatment. Patient discharged home on Procardia 30XL daily.  She was increased to Procardia 30XL BID outpatient due to uncontrolled BPs. She was increased to Procardia 60XL in AM and Procardia 30XL in PM today. She took Procardia 60XL today at 0730. Patient denies vision changes, chest pain, SOB, LE pain or swelling. Patient not currently breast feeding.

## 2023-11-23 NOTE — OB PROVIDER TRIAGE NOTE - NS_OBGYNHISTORY_OBGYN_ALL_OB_FT
OB Hx:  1 sAB, no d&c  FT nsvdx2 largest 6lbs    Gyn Hx:  H/o PCOS, previously treated with OCPs.  Denies h/o fibroids, STIs, or abnormal pap smears

## 2023-11-23 NOTE — H&P ADULT - HISTORY OF PRESENT ILLNESS
Patient is a 29 yo P2, s/p  23 at 37w0d, PPD#8 presents for evaluation of headache. Pregnancy complicated by chronic hypertension with superimposed preeclampsia. Patient s/p magnesium treatment. Patient discharged home on Procardia 30XL daily. She was increased to Procardia 30XL BID outpatient due to uncontrolled BPs. She was increased to Procardia 60XL in AM and Procardia 30XL in PM today. She took Procardia 60XL today at 0900. Patient reports on discharged she had a mild/moderate frontal headache 5/10 intensity. Reports her headache never fully resolved and has been fluctuating. Reports 1 day in the last week when she had no headache while she was on bed rest. Tylenol and motrin have not alleviated her discomfort. Reports her headache resolved when laying down at night and she is able to sleep through the night. Her headache gets worse when she stands and walks around. She denies any blurry vision, slurred speech, neck stiffness, confusion, tingling/numbness, difficulty walking.   PMH notable for H/O spont  once, H/O OCP intake for 11 years due to PCOS    FHx notable for father having stroke in his late 30s due to migraine. He  had his 2nd stroke at the age of 57.   She denied vision loss, diplopia, speech disturbance, or vertigo. Pt denied headache , neck pain or any recent head or neck trauma   The patient left last night with improvement of her headache on Fioricet, but came back again this morning with recurrence of her headache. She states that the headache resolved already after Fioricet      PPD#9 complainig of persistent headache. Patient was evaluated on labor and delivery last night, imaging was recommended however given that her headache had improved with Fioricet, patient elected to have imaging done outpatient. Patient however was unable to  Fioricet from the pharmacy and her headache came back.  Pregnancy was complicated by chronic hypertension with superimposed preeclampsia. Patient s/p magnesium treatment. Patient discharged home on Procardia 30XL daily.  She was increased to Procardia 30XL BID outpatient due to uncontrolled BPs. She was increased to Procardia 60XL in AM and Procardia 30XL in PM today. She took Procardia 60XL today at 0730. Patient denies vision changes, chest pain, SOB, LE pain or swelling. Patient not currently breast feeding.  (2023 10:58)   Patient is a 27 yo P2, s/p  23 at 37w0d, PPD#8 presents for evaluation of headache. Pregnancy complicated by chronic hypertension with superimposed preeclampsia. Patient s/p magnesium treatment. Patient discharged home on Procardia 30XL daily. She was increased to Procardia 30XL BID outpatient due to uncontrolled BPs. She was increased to Procardia 60XL in AM and Procardia 30XL in PM today. She took Procardia 60XL today at 0900. Patient reports on discharged she had a mild/moderate frontal headache 5/10 intensity. Reports her headache never fully resolved and has been fluctuating. Reports 1 day in the last week when she had no headache while she was on bed rest. Tylenol and motrin have not alleviated her discomfort. Reports her headache resolved when laying down at night and she is able to sleep through the night. Her headache gets worse when she stands and walks around. She denies any blurry vision, slurred speech, neck stiffness, confusion, tingling/numbness, difficulty walking.   PMH notable for H/O spont  once, H/O OCP intake for 11 years due to PCOS    FHx notable for father having stroke in his late 30s due to migraine. He  had his 2nd stroke at the age of 57.   She denied vision loss, diplopia, speech disturbance, or vertigo. Pt denied headache , neck pain or any recent head or neck trauma   The patient left last night with improvement of her headache on Fioricet, but came back again this morning with recurrence of her headache. She states that the headache resolved already after Fioricet      PPD#9 complainig of persistent headache. Patient was evaluated on labor and delivery last night, imaging was recommended however given that her headache had improved with Fioricet, patient elected to have imaging done outpatient. Patient however was unable to  Fioricet from the pharmacy and her headache came back.  Pregnancy was complicated by chronic hypertension with superimposed preeclampsia. Patient s/p magnesium treatment. Patient discharged home on Procardia 30XL daily.  She was increased to Procardia 30XL BID outpatient due to uncontrolled BPs. She was increased to Procardia 60XL in AM and Procardia 30XL in PM today. She took Procardia 60XL today at 0730. Patient denies vision changes, chest pain, SOB, LE pain or swelling. Patient not currently breast feeding.  (2023 10:58)

## 2023-11-23 NOTE — OB RN TRIAGE NOTE - FALL HARM RISK - UNIVERSAL INTERVENTIONS
Bed in lowest position, wheels locked, appropriate side rails in place/Call bell, personal items and telephone in reach/Instruct patient to call for assistance before getting out of bed or chair/Non-slip footwear when patient is out of bed/Turner to call system/Physically safe environment - no spills, clutter or unnecessary equipment/Purposeful Proactive Rounding/Room/bathroom lighting operational, light cord in reach

## 2023-11-23 NOTE — ED ADULT NURSE NOTE - OBJECTIVE STATEMENT
Pt. presents to the ED c/o worsening headaches since 11/14 when she gave birth. Pt. has a hx of pre-eclampsia. Pt. was seen in L&D yesterday and was told to get a MRI to r/o CSF leak from epidural. Pt. states she was supposed to get a blood patch yesterday, but L&D did not proceed due to HTN. Pt. denies N/V.

## 2023-11-23 NOTE — OB PROVIDER TRIAGE NOTE - NSHPPHYSICALEXAM_GEN_ALL_CORE
T(C): 36.7 (11-23-23 @ 08:41), Max: 36.7 (11-23-23 @ 08:41)  HR: 64 (11-23-23 @ 10:57) (64 - 115)  BP: 122/87 (11-23-23 @ 10:57) (121/84 - 156/105)  RR: 18 (11-23-23 @ 10:12) (18 - 20)  SpO2: 99% (11-23-23 @ 08:41) (92% - 99%)  BMI (kg/m2): 29.5 (11-23-23 @ 08:41)    Gen: A+OX3. NAD  Heart: RRR, no M/R/G  Lungs: CTAB  Abd: Soft, Nontender. Gravid. no RUQ or epigastric pain  LE: no erythema, edema, pain

## 2023-11-23 NOTE — OB PROVIDER TRIAGE NOTE - NSOBPROVIDERNOTE_OBGYN_ALL_OB_FT
29 yo P2, s/p  PPD#9, with chronic hypertension with superimposed preeclampsia, s/p magnesium, with persistent headache   -MRI/MRV ordered  -will reconsult neuro   -Fioricet q4hrs    Discussed with Dr. Gallagher

## 2023-11-23 NOTE — H&P ADULT - NSHPPHYSICALEXAM_GEN_ALL_CORE
NEUROLOGICAL EXAMINATION:  GENERAL:  Appearance is consistent with chronologic age.   COGNITION/LANGUAGE:  Awake, alert, and oriented to person, place, time and date.  Fluent, intact comprehension, repetition, naming. Recent and remote memory intact.  Fund of knowledge is appropriate.  Nondysarthric.    CRANIAL NERVES:   - Eyes:  Visual acuity intact. Pupils equal round and reactive, no RAPD. EOMI w/o nystagmus, skew or reported double vision. Normal visual field on confrontation. No ptosis/weakness of eyelid closure.   - Face:  Facial sensation normal V1 - 3, no facial asymmetry.    - Ears/Nose/Throat:  Hearing grossly intact b/l to finger rub.  Palate elevates midline.  Tongue and uvula midline.   MOTOR EXAM:  (R/L) 5/5 UE; 5/5 LE.  No observable drift. Normal tone and bulk. No tenderness, twitching, tremors or involuntary movements.  SENSORY EXAM:  Intact to light touch and pinprick, pain, temperature and proprioception and vibration in all extremities.  REFLEXES:   2+ b/l biceps, triceps, patella and achilles.  Plantar response downgoing b/l.  Jaw jerk, Elvis, clonus absent.  CEREBELLUM:  Finger to nose/Heel to knee and shin intact.  No dysmetria.    GAIT: narrow based and normal.  Romberg: negative.

## 2023-11-23 NOTE — CONSULT NOTE ADULT - SUBJECTIVE AND OBJECTIVE BOX
Neurology Consult    Patient is a 27 yo P2, s/p  23 at 37w0d, PPD#8 presents for evaluation of headache. Pregnancy complicated by chronic hypertension with superimposed preeclampsia. Patient s/p magnesium treatment. Patient discharged home on Procardia 30XL daily. She was increased to Procardia 30XL BID outpatient due to uncontrolled BPs. She was increased to Procardia 60XL in AM and Procardia 30XL in PM today. She took Procardia 60XL today at 0900. Patient reports on discharged she had a mild/moderate frontal headache 5/10 intensity. Reports her headache never fully resolved and has been fluctuating. Reports 1 day in the last week when she had no headache while she was on bed rest. Tylenol and motrin have not alleviated her discomfort. Reports her headache resolved when laying down at night and she is able to sleep through the night. Her headache gets worse when she stands and walks around. She denies any blurry vision, slurred speech, neck stiffness, confusion, tingling/numbness, difficulty walking.   PMH notable for H/O spont  once, H/O OCP intake for 11 years due to PCOS  FHx notable for father having stroke in his late 30s due to migraine. He  had his 2nd stroke at the age of 57.   She denied vision loss, diplopia, speech disturbance, or vertigo. Pt denied headache , neck pain or any recent head or neck trauma   The patient left last night with improvement of her headache on Fioricet, but came back again this morning with recurrence of her headache. She states that the headache resolved already after Fioricet      HPI:  27 yo P2, s/p  23 at 37w0d, PPD#9 presents for evaluation for persistent headache. Patient was evaluated on labor and delivery last night, imaging was recommended however given that her headache had improved with Fioricet, patient elected to have imaging done outpatient. Patient however was unable to  Fioricet from the pharmacy and her headache came back.  Pregnancy was complicated by chronic hypertension with superimposed preeclampsia. Patient s/p magnesium treatment. Patient discharged home on Procardia 30XL daily.  She was increased to Procardia 30XL BID outpatient due to uncontrolled BPs. She was increased to Procardia 60XL in AM and Procardia 30XL in PM today. She took Procardia 60XL today at 0730. Patient denies vision changes, chest pain, SOB, LE pain or swelling. Patient not currently breast feeding.  (2023 10:58)      PAST MEDICAL & SURGICAL HISTORY:  PCOS (polycystic ovarian syndrome)      Chronic hypertension      Bilateral inguinal hernia  repaired          FAMILY HISTORY:  Family history of hypertension (Father)        Social History: (-) x 3    Allergies    No Known Allergies    Intolerances        MEDICATIONS  (STANDING):  acetaminophen 300 mG/butalbital 50 mG/ caffeine 40 mG 1 Capsule(s) Oral every 4 hours    MEDICATIONS  (PRN):      Review of systems:    Constitutional: as per HPI  Eyes: No eye pain or discharge  ENMT:  No difficulty hearing; No sinus or throat pain  Neck: No pain or stiffness  Respiratory: No cough, wheezing, chills or hemoptysis  Cardiovascular: No chest pain, palpitations, shortness of breath, dyspnea on exertion  Gastrointestinal: No abdominal pain, nausea, vomiting or hematemesis; No diarrhea or constipation.   Genitourinary: No dysuria, frequency, hematuria or incontinence  Neurological: As per HPI  Skin: No rashes or lesions   Endocrine: No heat or cold intolerance; No hair loss  Musculoskeletal: No joint pain or swelling  Psychiatric: No depression, anxiety, mood swings  Heme/Lymph: No easy bruising or bleeding gums    Vital Signs Last 24 Hrs  T(C): 36.7 (2023 08:41), Max: 36.7 (2023 08:41)  T(F): 98.1 (2023 08:41), Max: 98.1 (2023 08:41)  HR: 70 (2023 13:52) (61 - 115)  BP: 136/90 (2023 13:52) (113/82 - 156/105)  BP(mean): --  RR: 18 (2023 10:12) (18 - 20)  SpO2: 99% (2023 08:41) (92% - 99%)        Examination:  General:  Appearance is consistent with chronologic age.  No abnormal facies.  Gross skin survey within normal limits.    Cognitive/Language:  The patient is oriented to person, place, time and date. Language with normal repetition, comprehension and naming.  Nondysarthric.    Eyes: intact VFF.  EOMI w/o nystagmus, skew or reported double vision.  PERRL.  No ptosis/weakness of eyelid closure.    Face:  Facial sensation normal V1 - 3, no facial asymmetry.    Ears/Nose/Throat:  Hearing grossly intact b/l.  Palate elevates midline.  Tongue and uvula midline.   Motor examination:   Normal tone, bulk and range of motion.  No tenderness, twitching, tremors or involuntary movements.  Formal Muscle Strength Testing: (MRC grade R/L) 5/5 UE; 5/5 LE.  No observable drift.  Reflexes: 2+ b/l biceps, triceps, brachioradialis, patella and Achilles.  Plantar response downgoing b/l.   Sensory examination:   Intact to light touch and pinprick, pain, temperature and proprioception and vibration in all extremities.  Cerebellum:   FTN/HKS intact with normal CHRISTIANO in all limbs.  No dysmetria or dysdiadokinesia.    Gait narrow based and normal.  No neck rigidity         Labs:   CBC Full  -  ( 2023 09:50 )  WBC Count : 7.57 K/uL  RBC Count : 5.28 M/uL  Hemoglobin : 15.1 g/dL  Hematocrit : 46.0 %  Platelet Count - Automated : 303 K/uL  Mean Cell Volume : 87.1 fL  Mean Cell Hemoglobin : 28.6 pg  Mean Cell Hemoglobin Concentration : 32.8 g/dL  Auto Neutrophil # : 4.53 K/uL  Auto Lymphocyte # : 2.04 K/uL  Auto Monocyte # : 0.77 K/uL  Auto Eosinophil # : 0.11 K/uL  Auto Basophil # : 0.07 K/uL  Auto Neutrophil % : 59.8 %  Auto Lymphocyte % : 26.9 %  Auto Monocyte % : 10.2 %  Auto Eosinophil % : 1.5 %  Auto Basophil % : 0.9 %        137  |  100  |  9<L>  ----------------------------<  89  5.1<H>   |  26  |  0.8    Ca    10.2      2023 09:50  Mg     2.3         TPro  8.6<H>  /  Alb  4.5  /  TBili  0.7  /  DBili  x   /  AST  37  /  ALT  38  /  AlkPhos  84      LIVER FUNCTIONS - ( 2023 09:50 )  Alb: 4.5 g/dL / Pro: 8.6 g/dL / ALK PHOS: 84 U/L / ALT: 38 U/L / AST: 37 U/L / GGT: x             Urinalysis Basic - ( 2023 09:50 )    Color: x / Appearance: x / SG: x / pH: x  Gluc: 89 mg/dL / Ketone: x  / Bili: x / Urobili: x   Blood: x / Protein: x / Nitrite: x   Leuk Esterase: x / RBC: x / WBC x   Sq Epi: x / Non Sq Epi: x / Bacteria: x          Neuroimaging:  [facility]:     23 @ 13:55

## 2023-11-23 NOTE — PATIENT PROFILE ADULT - FALL HARM RISK - ATTEMPT OOB
PO QD  0    fluticasone (FLONASE) 50 MCG/ACT nasal spray 1 spray by Nasal route daily 1 Bottle 1    citalopram (CELEXA) 10 MG tablet Take 1 tablet by mouth daily 90 tablet 3    albuterol sulfate HFA (PROAIR HFA) 108 (90 Base) MCG/ACT inhaler Inhale 2 puffs into the lungs every 6 hours as needed for Wheezing 1 Inhaler 0     Current Facility-Administered Medications on File Prior to Visit   Medication Dose Route Frequency Provider Last Rate Last Dose    lidocaine-EPINEPHrine 2 [id number] injection 3 mL  3 mL Intradermal Once Jennifer Marie, APRN - CNP         :  Penicillins    Review of Systems   Constitutional: Negative for chills, diaphoresis and fever. HENT: Negative for congestion, ear discharge, ear pain, hearing loss, nosebleeds, rhinorrhea, sore throat and tinnitus. Eyes: Negative for photophobia, pain and redness. Respiratory: Negative for cough, shortness of breath and wheezing. Cardiovascular: Negative for chest pain, palpitations and leg swelling. Gastrointestinal: Negative for abdominal pain, diarrhea, nausea and vomiting. Genitourinary: Negative for dysuria, flank pain, frequency and urgency. Musculoskeletal: Negative for back pain and myalgias. Skin: Positive for rash. Negative for color change and pallor. Neurological: Negative for dizziness, tremors, seizures, weakness and headaches. Hematological: Does not bruise/bleed easily. Psychiatric/Behavioral: The patient is not nervous/anxious. Objective:   /82 (Site: Right Upper Arm, Position: Sitting, Cuff Size: Medium Adult)   Pulse 75   Temp 97.5 °F (36.4 °C) (Temporal)   Resp 15   Ht 5' 3\" (1.6 m)   Wt 160 lb (72.6 kg)   SpO2 98%   BMI 28.34 kg/m²     Physical Exam   Constitutional: She is oriented to person, place, and time. No distress. HENT:   Head: Normocephalic and atraumatic. Mouth/Throat: Oropharynx is clear and moist.   Eyes: Pupils are equal, round, and reactive to light.  Conjunctivae No

## 2023-11-23 NOTE — PATIENT PROFILE ADULT - FALL HARM RISK - HARM RISK INTERVENTIONS

## 2023-11-23 NOTE — ED PROVIDER NOTE - OBJECTIVE STATEMENT
pt with PMH of postpartum headache recently discharged presents with worsening headache, no fever chills no nausea/vomitting.

## 2023-11-23 NOTE — CONSULT NOTE ADULT - ATTENDING COMMENTS
Patient seen and examined and agree with above except as noted..  Patients notes, labs, imaging, vitals and meds reviewed personally.  Patients headache has improved and she believes due too fioricet.  She no longer has positional headaches and was able to walk to the bathroom.  Inicidental cortical vein thrombosis was suspected on MRV done overnight prompting her admission.  Neuroexam is normal   Assessment: Possible cortical vein thrombosis in setting of CSF leak following epidural anesthesia for delivery.  Underlying hypercoagulable state will also be ruled out.    Plan  MRI brain w/w/o PHIL  Continue lovenox for now and may switch to oral anticoagulant once diagnosis is confirmed.

## 2023-11-23 NOTE — ED ADULT TRIAGE NOTE - CHIEF COMPLAINT QUOTE
Presented to ED c/o worsening headaches since 11/14 after vaginal delivery. Worsening today. Has h/o pre-eclampsia.

## 2023-11-23 NOTE — H&P ADULT - NSHPLABSRESULTS_GEN_ALL_CORE
LABS:                        15.1   7.57  )-----------( 303      ( 23 Nov 2023 09:50 )             46.0     11-23    137  |  100  |  9<L>  ----------------------------<  89  5.1<H>   |  26  |  0.8    Ca    10.2      23 Nov 2023 09:50  Mg     2.3     11-23    TPro  8.6<H>  /  Alb  4.5  /  TBili  0.7  /  DBili  x   /  AST  37  /  ALT  38  /  AlkPhos  84  11-23

## 2023-11-23 NOTE — H&P ADULT - ASSESSMENT
Ms. Wang is a 28 year old postpartum female with H/O preeclampsia and HTN is being evaluated for headache starting following her NVD with epidural. The positional component and lack of other red sign is consistent with CSF leak headache; however, because of being post-partum and FHx of stroke in early age, MRV, MRA were done that showed possible a thrombosed cortical vein; in addition, abnormal increased signal measuring 7 mm is present within the extra-axial lateral aspect of the left cerebellum, which is not clear what it represent.     Plan:     # Stroke w/up to r/o of thrombosed cortical veins    - Admit to Stroke unit  - Keep normotension  - MR brain w/wo   - Start enoxaparin 1 mg/kg bid  - Coags, routine labs  - Hypercoagulable profile   - Neurochecks q4h  - Fioricet 1 tab q6h PRN for HA    #HTN  - c/w Procardia 30  - BP monitoring     #Prophylactic measure.    - DVT ppx: Lovenox full dose q12h  - GI ppx: No need  - Diet: DASH  - Activity: Seizure/ fall protocol. constant obs   - Dispo: Stroke unit  - Code status: FULL.

## 2023-11-23 NOTE — H&P ADULT - PATIENT'S PREFERRED PRONOUN
MRN:[id number]                      After Visit Summary   2/28/2017    Nik Hernandez    MRN: [id number]           Thank you!     Thank you for choosing [facility] for your care. Our goal is always to provide you with excellent care. Hearing back from our patients is one way we can continue to improve our services. Please take a few minutes to complete the written survey that you may receive in the mail after you visit with us. Thank you!        Patient Information     Date Of Birth          2011        About your child's hospital stay     Your child was admitted on:  February 28, 2017 Your child last received care in the:  [facility] [place]    Your child was discharged on:  February 28, 2017        Reason for your hospital stay       Extraction of teeth #E and Mesiodens                  Who to Call     For medical emergencies, please call 911.  For non-urgent questions about your medical care, please call your primary care provider or clinic, [phone]  For questions related to your surgery, please call your surgery clinic        Attending Provider     Provider Specialty    Nito Killian DDS Oral Surgery       Primary Care Provider Office Phone # Fax #    Dennise Tu Martins [phone]       [facility] [address] [place] [address]         When to contact your care team       Bleeding that persists for greater than 1-2 days.  Pain that is not controlled with over the counter pain medications (Children's Tylenol and Ibuprofen)                  After Care Instructions     Activity       Your activity upon discharge: activity as tolerated and ambulate in house            Diet       Follow this diet upon discharge: Regular            Discharge Instructions       - Firm, constant pressure with dampened gauze for 30 minutes as needed for oozing.    - Pain control with over the counter pain medications as needed for dental discomfort.            Wound care and dressings        Instructions to care for your wound at home: Keep mouth clean, brush as normal                  Follow-up Appointments     Adult [facility] Follow-up and recommended labs and tests       No follow-up is needed with the Oral & Maxillofacial Surgery Department.      Please call [phone] with any questions and/or concerns.                  Further instructions from your care team       Same-Day Surgery   Discharge Orders & Instructions For Your Child    For 24 hours after surgery:  1. Your child should get plenty of rest.  Avoid strenuous play.  Offer reading, coloring and other light activities.   2. Your child may go back to a regular diet.  Offer light meals at first.   3. If your child has nausea (feels sick to the stomach) or vomiting (throws up):  offer clear liquids such as apple juice, flat soda pop, Jell-O, Popsicles, Gatorade and clear soups.  Be sure your child drinks enough fluids.  Move to a normal diet as your child is able.   4. Your child may feel dizzy or sleepy.  He or she should avoid activities that required balance (riding a bike or skateboard, climbing stairs, skating).  5. A slight fever is normal.  Call the doctor if the fever is over 100 F (37.7 C) (taken under the tongue) or lasts longer than 24 hours.  6. Your child may have a dry mouth, flushed face, sore throat, muscle aches, or nightmares.  These should go away within 24 hours.  7. A responsible adult must stay with the child.  All caregivers should get a copy of these instructions.   Pain Management:      1. Take pain medication (if prescribed) for pain as directed by your physician.        2. WARNING: If the pain medication you have been prescribed contains Tylenol    (acetaminophen), DO NOT take additional doses of Tylenol (acetaminophen).    Call your doctor for any of the followin.   Signs of infection (fever, growing tenderness at the surgery site, severe pain, a large amount of drainage or bleeding, foul-smelling drainage,  redness, swelling).    2.   It has been over 8 to 10 hours since surgery and your child is still not able to urinate (pee) or is complaining about not being able to urinate (pee).   To contact a doctor, call _____________________________________ or:      [phone] and ask for the Resident On Call for          __________________________________________ (answered 24 hours a day)      Emergency Department:  [facility] Children[facility] Emergency Department: [phone]             Rev. 10/2014     FOLLOW UP DENTAL CARE AFTER DENTAL SURGERY UNDER GENERAL ANESTHESIA   [facility]    **Call the [facility] to set up your child s NEXT appointment**    [facility], [address] [place] [address] S, [address], [place] [address]  Clinic Telephone:  [phone]    SCHEDULE NEXT APPOINTMENT FOR:____ months         After dental surgery care or emergencies that develop during hours when our clinic is closed (5 PM - 8AM Monday through Friday, all hours on weekends) should be directed to the Pediatric Dental Resident on Call.  Please call [phone] and specifically ask the  to page the Pediatric Dental Resident On-Call.      INSTRUCTIONS AFTER DENTAL SURGERY UNDER GENERAL ANESTHESIA  [facility]    Daily Activities:  Your child should be limited to quiet, restful activities today.  Your child may return to school or  tomorrow as per his or her normal routine.  Physical activity can begin tomorrow.  Your child can bathe normally after surgery.      Bleeding:  Bleeding after dental procedures are a common finding.  Areas of bleeding within your child s mouth were controlled before the child was awakened from general anesthesia.  It is not unusual for periodic oozing (pink or blood tinged saliva) to occur after dental surgery.  Hold gauze or a clean towel with firm pressure  against the surgical site until the oozing has stopped.      Swelling:  Slight swelling in the lips and cheeks are common after surgery and for the following 2 days.  If swelling occurs, ice packs may be used for the first 24 hours (10 minutes on, 10 minutes off) to decrease the swelling.  If swelling persists after 24 hours, warm, moist compresses (10 minutes on, 10 minutes off) may help.  If swelling develops or remains after 48 hours, please contact our office.      Diet:  After all bleeding has stopped, the patient may drink cool, non-carbonated beverages but should not use a straw.  Encourage your child to drink fluids to prevent dehydration.  Cool soft foods (eg. Jell-O, pudding, yogurt) are ideal the first day.  By the second day, consistency of foods can progress as tolerated.  Avoid hard, crunchy foods such as nuts, sunflower, seeds, pretzels, and popcorn that may get stuck in the surgical areas.      Oral Hygiene:  Keeping the mouth clean is essential for your child s healing.  Today, teeth may be brushed and flossed gently, but avoid brushing over surgical sites.  Soreness and swelling may not permit your child to brush effectively.  Please make every effort to clean the teeth within the limits of your child s comfort.      Pain:  Discomfort after surgery is common for the first 48 hours.  Acetaminophen (Tylenol) or ibuprofen (Motrin) can be used for pain control unless a doctor has advised against their use for your child.  If this is the case, please speak directly with the dentist to explore other medications for pain control.  Do not give your child Aspirin.  Tylenol or Motrin should be given according to the instructions on the bottle taking into account your child s age and weight.  If pain is not relieved by these medications, please contact the dentist to determine the best course of action.      INSTRUCTIONS AFTER DENTAL SURGERY UNDER GENERAL ANESTHESIA    Fever:  A slight fever (up to 100.5 F)  is not uncommon for the first 48 hours after surgery.  If a higher fever develops or if the fever persists for more than 48 hours, please contact our office at [phone].     Surgical Site Care:  Today, do not disturb the surgical site if teeth have been removed.  Do not allow the child to use a straw or sippy for the first 2 days after surgery.  Do not stretch the lips or cheeks to look at the area.  Do not allow the child to rinse the mouth, use mouthwash, or probe the area with fingers or other objects.  Beginning tomorrow, the child may rinse with warm water every 2 to 4 hours and especially after meals.  If you prefer, your child may rinse with warm salt water [1 teaspoon of salt with one cup (8 ounces) of water].       Numbness:  Dental procedures in the operating room do not routinely require the use of medications that numb the teeth, gums, or other parts of the mouth.  If numbing medications have been used during your child s surgery, he or she can cause damage to his or her lips, cheeks, and tongue by biting or scatching the area.  Please monitor your child closely to prevent them from biting or scatching areas of the mouth that are numb after surgery.  The numbing medications can last for 2 to 4 hours after the dental procedure is complete.      Silver Caps:  If the dentist has placed stainless steel crowns ( silver caps ) on your child s teeth, your child should avoid eating hard, sticky foods to prevent dislodging the silver caps.  Silver caps should be kept clean to avoid irritation to the surrounding gum tissues.  Should a silver cap become loose or dislodged in the future, please have your child seen at our clinic.      Stitches:  Stitches are occasionally used to assist healing of surgical sites.  The stitches if used will dissolve on their own.  Your child does not need to be seen in the office to have them removed.  If the stitches come out within the first 24 hours, please call our office.   "    Dry Socket:  Premature dissolving or loss of a blood clot following removal of a permanent tooth is an uncommon event after dental surgery in children.  Loss of the blood clot can result in a  dry socket.   This typically occurs on the 3rd to 5th day after tooth extraction, with a persistent throbbing pain in the jaw.  Call our office if this occurs as an office visit may be necessary.      After dental surgery care or emergencies that develop during hours when our clinic is closed (5 PM - 8AM Monday through Friday, all hours on weekends) should be directed to the Pediatric Dental Resident on Call.  Please call [phone] and specifically ask the  to page the Pediatric Dental Resident On-Call.          Pending Results     No orders found from 2/26/2017 to 3/1/2017.            Statement of Approval     Ordered          02/28/17 0833  I have reviewed and agree with all the recommendations and orders detailed in this document.  EFFECTIVE NOW     Approved and electronically signed by:  Terence Herrmann DDS             Admission Information     Date & Time Provider Department Dept. Phone    2/28/2017 Nito Killian DDS UR MAIN [phone]      Your Vitals Were     Blood Pressure Temperature Respirations Height Weight Pulse Oximetry    93/61 97.7  F (36.5  C) (Oral) 22 1.232 m (4' 0.5\") 25.8 kg (56 lb 14.1 oz) 100%    BMI (Body Mass Index)                   17 kg/m2           ClassPass Information     ClassPass lets you send messages to your doctor, view your test results, renew your prescriptions, schedule appointments and more. To sign up, go to [email or web address], contact your [facility] clinic or call [phone] during business hours.            Care EveryWhere ID     This is your Care EveryWhere ID. This could be used by other organizations to access your [facility] medical records  [id number]           Review of your medicines      CONTINUE these medicines which have NOT CHANGED        " Dose / Directions    diazepam 10 MG Gel rectal kit   Commonly known as:  DIASTAT ACUDIAL        Dose:  0.5 mg/kg   Place 10 mg rectally once as needed for seizures   Quantity:  3 each   Refills:  5       TRILEPTAL PO        Dose:  420 mg   Take 420 mg by mouth 2 times daily Takes 7ml  300mg/ml   Refills:  0                Protect others around you: Learn how to safely use, store and throw away your medicines at www.disposemymeds.org.             Medication List: This is a list of all your medications and when to take them. Check marks below indicate your daily home schedule. Keep this list as a reference.      Medications           Morning Afternoon Evening Bedtime As Needed    diazepam 10 MG Gel rectal kit   Commonly known as:  DIASTAT ACUDIAL   Place 10 mg rectally once as needed for seizures                                TRILEPTAL PO   Take 420 mg by mouth 2 times daily Takes 7ml  300mg/ml                                   Her/She

## 2023-11-24 ENCOUNTER — TRANSCRIPTION ENCOUNTER (OUTPATIENT)
Age: 28
End: 2023-11-24

## 2023-11-24 VITALS — HEART RATE: 92 BPM | SYSTOLIC BLOOD PRESSURE: 147 MMHG | DIASTOLIC BLOOD PRESSURE: 105 MMHG

## 2023-11-24 DIAGNOSIS — R51.9 HEADACHE, UNSPECIFIED: ICD-10-CM

## 2023-11-24 LAB
ANION GAP SERPL CALC-SCNC: 11 MMOL/L — SIGNIFICANT CHANGE UP (ref 7–14)
ANION GAP SERPL CALC-SCNC: 11 MMOL/L — SIGNIFICANT CHANGE UP (ref 7–14)
BUN SERPL-MCNC: 10 MG/DL — SIGNIFICANT CHANGE UP (ref 10–20)
BUN SERPL-MCNC: 10 MG/DL — SIGNIFICANT CHANGE UP (ref 10–20)
CALCIUM SERPL-MCNC: 9.5 MG/DL — SIGNIFICANT CHANGE UP (ref 8.4–10.5)
CALCIUM SERPL-MCNC: 9.5 MG/DL — SIGNIFICANT CHANGE UP (ref 8.4–10.5)
CHLORIDE SERPL-SCNC: 105 MMOL/L — SIGNIFICANT CHANGE UP (ref 98–110)
CHLORIDE SERPL-SCNC: 105 MMOL/L — SIGNIFICANT CHANGE UP (ref 98–110)
CHOLEST SERPL-MCNC: 222 MG/DL — HIGH
CHOLEST SERPL-MCNC: 222 MG/DL — HIGH
CO2 SERPL-SCNC: 26 MMOL/L — SIGNIFICANT CHANGE UP (ref 17–32)
CO2 SERPL-SCNC: 26 MMOL/L — SIGNIFICANT CHANGE UP (ref 17–32)
CREAT SERPL-MCNC: 0.8 MG/DL — SIGNIFICANT CHANGE UP (ref 0.7–1.5)
CREAT SERPL-MCNC: 0.8 MG/DL — SIGNIFICANT CHANGE UP (ref 0.7–1.5)
EGFR: 102 ML/MIN/1.73M2 — SIGNIFICANT CHANGE UP
EGFR: 102 ML/MIN/1.73M2 — SIGNIFICANT CHANGE UP
ERYTHROCYTE [SEDIMENTATION RATE] IN BLOOD: 28 MM/HR — HIGH (ref 0–20)
ERYTHROCYTE [SEDIMENTATION RATE] IN BLOOD: 28 MM/HR — HIGH (ref 0–20)
GLUCOSE SERPL-MCNC: 80 MG/DL — SIGNIFICANT CHANGE UP (ref 70–99)
GLUCOSE SERPL-MCNC: 80 MG/DL — SIGNIFICANT CHANGE UP (ref 70–99)
HCYS SERPL-MCNC: 7.8 UMOL/L — SIGNIFICANT CHANGE UP
HCYS SERPL-MCNC: 7.8 UMOL/L — SIGNIFICANT CHANGE UP
HCYS SERPL-MCNC: 8.6 UMOL/L — SIGNIFICANT CHANGE UP
HCYS SERPL-MCNC: 8.6 UMOL/L — SIGNIFICANT CHANGE UP
HDLC SERPL-MCNC: 69 MG/DL — SIGNIFICANT CHANGE UP
HDLC SERPL-MCNC: 69 MG/DL — SIGNIFICANT CHANGE UP
LIPID PNL WITH DIRECT LDL SERPL: 128 MG/DL — HIGH
LIPID PNL WITH DIRECT LDL SERPL: 128 MG/DL — HIGH
NON HDL CHOLESTEROL: 153 MG/DL — HIGH
NON HDL CHOLESTEROL: 153 MG/DL — HIGH
POTASSIUM SERPL-MCNC: 4.2 MMOL/L — SIGNIFICANT CHANGE UP (ref 3.5–5)
POTASSIUM SERPL-MCNC: 4.2 MMOL/L — SIGNIFICANT CHANGE UP (ref 3.5–5)
POTASSIUM SERPL-SCNC: 4.2 MMOL/L — SIGNIFICANT CHANGE UP (ref 3.5–5)
POTASSIUM SERPL-SCNC: 4.2 MMOL/L — SIGNIFICANT CHANGE UP (ref 3.5–5)
SODIUM SERPL-SCNC: 142 MMOL/L — SIGNIFICANT CHANGE UP (ref 135–146)
SODIUM SERPL-SCNC: 142 MMOL/L — SIGNIFICANT CHANGE UP (ref 135–146)
TRIGL SERPL-MCNC: 125 MG/DL — SIGNIFICANT CHANGE UP
TRIGL SERPL-MCNC: 125 MG/DL — SIGNIFICANT CHANGE UP

## 2023-11-24 PROCEDURE — 99221 1ST HOSP IP/OBS SF/LOW 40: CPT

## 2023-11-24 PROCEDURE — G0452: CPT | Mod: 26

## 2023-11-24 PROCEDURE — 99222 1ST HOSP IP/OBS MODERATE 55: CPT

## 2023-11-24 PROCEDURE — 70553 MRI BRAIN STEM W/O & W/DYE: CPT | Mod: 26

## 2023-11-24 RX ORDER — ACETAMINOPHEN 500 MG
650 TABLET ORAL EVERY 6 HOURS
Refills: 0 | Status: DISCONTINUED | OUTPATIENT
Start: 2023-11-24 | End: 2023-11-24

## 2023-11-24 RX ORDER — APIXABAN 2.5 MG/1
1 TABLET, FILM COATED ORAL
Qty: 60 | Refills: 3
Start: 2023-11-24

## 2023-11-24 RX ORDER — APIXABAN 2.5 MG/1
5 TABLET, FILM COATED ORAL EVERY 12 HOURS
Refills: 0 | Status: DISCONTINUED | OUTPATIENT
Start: 2023-11-24 | End: 2023-11-24

## 2023-11-24 RX ADMIN — ENOXAPARIN SODIUM 90 MILLIGRAM(S): 100 INJECTION SUBCUTANEOUS at 05:37

## 2023-11-24 RX ADMIN — Medication 60 MILLIGRAM(S): at 08:27

## 2023-11-24 NOTE — CONSULT NOTE ADULT - SUBJECTIVE AND OBJECTIVE BOX
HPI:  Patient is a 27 yo P2, s/p  23 at 37w0d, PPD#8 presents for evaluation of headache. Pregnancy complicated by chronic hypertension with superimposed preeclampsia. Patient s/p magnesium treatment. Patient discharged home on Procardia 30XL daily. She was increased to Procardia 30XL BID outpatient due to uncontrolled BPs. She was increased to Procardia 60XL in AM and Procardia 30XL in PM today. She took Procardia 60XL today at 0900. Patient reports on discharged she had a mild/moderate frontal headache 5/10 intensity. Reports her headache never fully resolved and has been fluctuating. Reports 1 day in the last week when she had no headache while she was on bed rest. Tylenol and motrin have not alleviated her discomfort. Reports her headache resolved when laying down at night and she is able to sleep through the night. Her headache gets worse when she stands and walks around. She denies any blurry vision, slurred speech, neck stiffness, confusion, tingling/numbness, difficulty walking.   PMH notable for H/O spont  once, H/O OCP intake for 11 years due to PCOS    FHx notable for father having stroke in his late 30s due to migraine. He  had his 2nd stroke at the age of 57.   She denied vision loss, diplopia, speech disturbance, or vertigo. Pt denied headache , neck pain or any recent head or neck trauma   The patient left last night with improvement of her headache on Fioricet, but came back again this morning with recurrence of her headache. She states that the headache resolved already after Fioricet      PPD#9 complainig of persistent headache. Patient was evaluated on labor and delivery last night, imaging was recommended however given that her headache had improved with Fioricet, patient elected to have imaging done outpatient. Patient however was unable to  Fioricet from the pharmacy and her headache came back.  Pregnancy was complicated by chronic hypertension with superimposed preeclampsia. Patient s/p magnesium treatment. Patient discharged home on Procardia 30XL daily.  She was increased to Procardia 30XL BID outpatient due to uncontrolled BPs. She was increased to Procardia 60XL in AM and Procardia 30XL in PM today. She took Procardia 60XL today at 0730. Patient denies vision changes, chest pain, SOB, LE pain or swelling. Patient not currently breast feeding.  (2023 10:58)   (2023 18:31)      PAST MEDICAL & SURGICAL HISTORY:  PCOS (polycystic ovarian syndrome)      Chronic hypertension      Bilateral inguinal hernia  repaired          Hospital Course:  Headache but no difficulty walking.   She works as a Pediatric nurse.   TODAY'S SUBJECTIVE & REVIEW OF SYMPTOMS:     Constitutional WNL   Cardio WNL   Resp WNL   GI WNL  Heme WNL  Endo WNL  Skin WNL  MSK WNL  Neuro WNL  Cognitive WNL  Psych WNL      MEDICATIONS  (STANDING):  enoxaparin Injectable 90 milliGRAM(s) SubCutaneous every 12 hours  NIFEdipine XL 60 milliGRAM(s) Oral every 24 hours  NIFEdipine XL 30 milliGRAM(s) Oral at bedtime    MEDICATIONS  (PRN):  acetaminophen     Tablet .. 650 milliGRAM(s) Oral every 6 hours PRN Temp greater or equal to 38C (100.4F), Mild Pain (1 - 3)      FAMILY HISTORY:  Family history of hypertension (Father)        Allergies    No Known Allergies    Intolerances        SOCIAL HISTORY:    [  ] Etoh  [  ] Smoking  [  ] Substance abuse     Home Environment:  [  ] Home Alone  [ x ] Lives with Family  [  ] Home Health Aid    Dwelling:  [  ] Apartment  [x  ] Private House  [  ] Adult Home  [  ] Skilled Nursing Facility      [  ] Short Term  [  ] Long Term  [ x ] Stairs       Elevator [  ]    FUNCTIONAL STATUS PTA: (Check all that apply)  Ambulation: [   ]Independent    [  ] Dependent     [  ] Non-Ambulatory  Assistive Device: [  ] SA Cane  [  ]  Q Cane  [  ] Walker  [  ]  Wheelchair  ADL : [  ] Independent  [  ]  Dependent       Vital Signs Last 24 Hrs  T(C): 36.2 (2023 08:00), Max: 36.9 (2023 20:58)  T(F): 97.1 (2023 08:00), Max: 98.5 (2023 20:58)  HR: 79 (2023 08:30) (61 - 79)  BP: 129/92 (2023 08:30) (113/80 - 160/97)  BP(mean): 105 (2023 08:30) (91 - 135)  RR: 18 (2023 04:00) (18 - 18)  SpO2: 99% (2023 08:30) (97% - 99%)    Parameters below as of 2023 08:00  Patient On (Oxygen Delivery Method): room air          PHYSICAL EXAM: Alert & Oriented X3  GENERAL: NAD, well-groomed, well-developed  HEAD:  Atraumatic, Normocephalic  EYES: EOMI, PERRLA, conjunctiva and sclera clear  NECK: Supple, No JVD, Normal thyroid  CHEST/LUNG: Clear bilaterally; No rales, rhonchi, wheezing, or rubs  HEART: Regular rate and rhythm; No murmurs, rubs, or gallops  ABDOMEN: Soft, Nontender, Nondistended; Bowel sounds present  EXTREMITIES:  2+ Peripheral Pulses, No clubbing, cyanosis, or edema    NERVOUS SYSTEM:  Cranial Nerves 2-12 intact [  ] Abnormal  [  ]  ROM: WFL all extremities [  ]  Abnormal [  ]  Motor Strength: WFL all extremities  [  ]  Abnormal [ x ] 5/5 all 4 extrem  Sensation: intact to light touch [  ] Abnormal [  ]  Reflexes: Symmetric [  ]  Abnormal [  ]    FUNCTIONAL STATUS:  Bed Mobility: Independent [  ]  Supervision [  ]  Needs Assistance [  ]  N/A [  ]  Transfers: Independent [  ]  Supervision [  ]  Needs Assistance [  ]  N/A [  ]   Ambulation: Independent [x]  Supervision [  ]  Needs Assistance [  ]  N/A [  ]  amb with me 150 ft brisk  ADL: Independent [  ] Requires Assistance [  ] N/A [  ]      LABS:                        15.1   7.57  )-----------( 303      ( 2023 09:50 )             46.0     11-    142  |  105  |  10  ----------------------------<  80  4.2   |  26  |  0.8    Ca    9.5      2023 05:43  Mg     2.3         TPro  8.6<H>  /  Alb  4.5  /  TBili  0.7  /  DBili  x   /  AST  37  /  ALT  38  /  AlkPhos  84        Urinalysis Basic - ( 2023 05:43 )    Color: x / Appearance: x / SG: x / pH: x  Gluc: 80 mg/dL / Ketone: x  / Bili: x / Urobili: x   Blood: x / Protein: x / Nitrite: x   Leuk Esterase: x / RBC: x / WBC x   Sq Epi: x / Non Sq Epi: x / Bacteria: x        RADIOLOGY & ADDITIONAL STUDIES:    Assesment:

## 2023-11-24 NOTE — DISCHARGE NOTE NURSING/CASE MANAGEMENT/SOCIAL WORK - NSDCPEFALRISK_GEN_ALL_CORE
For information on Fall & Injury Prevention, visit: [email or web address] OR  [email or web address] OR  https://www.cdc.gov/steadi/patient.html

## 2023-11-24 NOTE — PHYSICAL THERAPY INITIAL EVALUATION ADULT - GENERAL OBSERVATIONS, REHAB EVAL
130-145 pm Chart reviewed. Pt. seen semirecline in bed , in No apparent distress , + telemetry , IV lock, denies pain, Pt. agreed to activity/therapy.

## 2023-11-24 NOTE — DISCHARGE NOTE PROVIDER - NSDCMRMEDTOKEN_GEN_ALL_CORE_FT
acetaminophen 325 mg oral tablet: 3 tab(s) orally every 6 hours  benzocaine 20% topical spray: 1 Apply topically to affected area every 6 hours As needed for Perineal discomfort  Fioricet 50 mg-300 mg-40 mg oral capsule: 1 cap(s) orally every 4 hours as needed for  headache  ibuprofen 600 mg oral tablet: 1 tab(s) orally every 6 hours  magnesium hydroxide 8% oral suspension: 30 milliliter(s) orally 2 times a day As needed Constipation  NIFEdipine 30 mg oral tablet, extended release: 1 tab(s) orally once a day  Prenatal Multivitamins with Folic Acid 1 mg oral tablet: 1 tab(s) orally once a day  senna leaf extract oral tablet: 2 tab(s) orally once a day (at bedtime)  simethicone 80 mg oral tablet, chewable: 1 tab(s) orally every 4 hours As needed Gas  witch hazel 50% topical pad: 1 Apply topically to affected area every 4 hours As needed Perineal discomfort   acetaminophen 325 mg oral tablet: 3 tab(s) orally every 6 hours  apixaban 5 mg oral tablet: 1 tab(s) orally every 12 hours  benzocaine 20% topical spray: 1 Apply topically to affected area every 6 hours As needed for Perineal discomfort  magnesium hydroxide 8% oral suspension: 30 milliliter(s) orally 2 times a day As needed Constipation  NIFEdipine 30 mg oral tablet, extended release: 1 tab(s) orally once a day  Prenatal Multivitamins with Folic Acid 1 mg oral tablet: 1 tab(s) orally once a day  senna leaf extract oral tablet: 2 tab(s) orally once a day (at bedtime)  simethicone 80 mg oral tablet, chewable: 1 tab(s) orally every 4 hours As needed Gas  witch hazel 50% topical pad: 1 Apply topically to affected area every 4 hours As needed Perineal discomfort

## 2023-11-24 NOTE — PHYSICAL THERAPY INITIAL EVALUATION ADULT - PERTINENT HX OF CURRENT PROBLEM, REHAB EVAL
27 yo P2, s/p  23 at 37w0d, PPD#8 presents for evaluation of headache. Pregnancy complicated by chronic hypertension with superimposed preeclampsia. Patient s/p magnesium treatment. Patient discharged home on Procardia 30XL daily. She was increased to Procardia 30XL BID outpatient due to uncontrolled BPs. She was increased to Procardia 60XL in AM and Procardia 30XL in PM today. She took Procardia 60XL today at 0900. Patient reports on discharged she had a mild/moderate frontal headache 5/10 intensity. Reports her headache never fully resolved and has been fluctuating. Reports 1 day in the last week when she had no headache while she was on bed rest. Tylenol and motrin have not alleviated her discomfort. Reports her headache resolved when laying down at night and she is able to sleep through the night. Her headache gets worse when she stands and walks around. She denies any blurry vision, slurred speech, neck stiffness, confusion, tingling/numbness, difficulty walking.

## 2023-11-24 NOTE — CONSULT NOTE ADULT - SUBJECTIVE AND OBJECTIVE BOX
SHALINI OH  28y  Female    Patient is a 28y old  Female who presents with a chief complaint of HA (2023 11:48)      HPI:  Patient is a 29 yo P2, s/p  23 at 37w0d, PPD#8 presents for evaluation of headache. Pregnancy complicated by chronic hypertension with superimposed preeclampsia. Patient s/p magnesium treatment. Patient discharged home on Procardia 30XL daily. She was increased to Procardia 30XL BID outpatient due to uncontrolled BPs. She was increased to Procardia 60XL in AM and Procardia 30XL in PM today. She took Procardia 60XL today at 0900. Patient reports on discharged she had a mild/moderate frontal headache 5/10 intensity. Reports her headache never fully resolved and has been fluctuating. Reports 1 day in the last week when she had no headache while she was on bed rest. Tylenol and motrin have not alleviated her discomfort. Reports her headache resolved when laying down at night and she is able to sleep through the night. Her headache gets worse when she stands and walks around. She denies any blurry vision, slurred speech, neck stiffness, confusion, tingling/numbness, difficulty walking.   PMH notable for H/O spont  once, H/O OCP intake for 11 years due to PCOS    FHx notable for father having stroke in his late 30s due to migraine. He  had his 2nd stroke at the age of 57.   She denied vision loss, diplopia, speech disturbance, or vertigo. Pt denied headache , neck pain or any recent head or neck trauma   The patient left last night with improvement of her headache on Fioricet, but came back again this morning with recurrence of her headache. She states that the headache resolved already after Fioricet      PPD#9 complainig of persistent headache. Patient was evaluated on labor and delivery last night, imaging was recommended however given that her headache had improved with Fioricet, patient elected to have imaging done outpatient. Patient however was unable to  Fioricet from the pharmacy and her headache came back.  Patient denies vision changes, chest pain, SOB, LE pain or swelling. Patient not currently breast feeding.  (2023 10:58)   (2023 18:31)    S: Patient was examined and seen at bedside. This morning pt is resting comfortably in bed and reports no new issues or overnight events. HA is better. No complaints, feels better  Denies CP, SOB, N/V/D/C/AP, cough, F, chills, dizziness, new focal weakness, HA, vision changes, dysuria, or urinary symptoms, blood in stool.  ROS: all other systems reviewed and are negative    PAST MEDICAL & SURGICAL HISTORY:  PCOS (polycystic ovarian syndrome)      Chronic hypertension      Bilateral inguinal hernia  repaired        SOCIAL HISTORY:  Tobacco: negative  Illicit drugs: negative  Alcohol: social  Family history reviewed and otherwise non-contributory No clotting disorders, CVAs at early age.  ALLERGIES: NKDA    MEDICATIONS  (STANDING):  apixaban 5 milliGRAM(s) Oral every 12 hours  NIFEdipine XL 60 milliGRAM(s) Oral every 24 hours  NIFEdipine XL 30 milliGRAM(s) Oral at bedtime    MEDICATIONS  (PRN):  acetaminophen     Tablet .. 650 milliGRAM(s) Oral every 6 hours PRN Temp greater or equal to 38C (100.4F), Mild Pain (1 - 3)    Home Medications:  acetaminophen 325 mg oral tablet: 3 tab(s) orally every 6 hours (2023 08:56)  benzocaine 20% topical spray: 1 Apply topically to affected area every 6 hours As needed for Perineal discomfort (2023 08:56)  magnesium hydroxide 8% oral suspension: 30 milliliter(s) orally 2 times a day As needed Constipation (2023 08:56)  Prenatal Multivitamins with Folic Acid 1 mg oral tablet: 1 tab(s) orally once a day (2023 08:56)  senna leaf extract oral tablet: 2 tab(s) orally once a day (at bedtime) (2023 08:56)  simethicone 80 mg oral tablet, chewable: 1 tab(s) orally every 4 hours As needed Gas (2023 08:56)  witch hazel 50% topical pad: 1 Apply topically to affected area every 4 hours As needed Perineal discomfort (2023 08:56)          Vital Signs Last 24 Hrs  T(C): 36.4 (2023 12:00), Max: 36.9 (2023 20:58)  T(F): 97.6 (2023 12:00), Max: 98.5 (2023 20:58)  HR: 92 (2023 13:36) (66 - 92)  BP: 147/105 (2023 13:36) (113/80 - 160/97)  BP(mean): 120 (2023 13:36) (91 - 135)  RR: 18 (2023 04:00) (18 - 18)  SpO2: 97% (2023 12:00) (97% - 99%)    Parameters below as of 2023 12:00  Patient On (Oxygen Delivery Method): room air      CAPILLARY BLOOD GLUCOSE          General: NAD. Looks stated age.  HEENT: clean oropharynx, EOMI, no LAD  Neck: trachea midline, no thyromegaly  CV: nl S1 S2; no m/r/g  Resp: decreased breath sounds at base  GI: NT/ND/S +BS  MS: no clubbing/cyanosis/edema, +pulses  Neuro: motor, sensory intact; + reflexes  Skin: no rashes, nl turgor  Psychiatric: AA0x3 w/ intact insight and judgement    tele: SR, nonspecific changes (on my own evaluation of tele monitor)        LABS:                        15.1   7.57  )-----------( 303      ( 2023 09:50 )             46.0     11-24    142  |  105  |  10  ----------------------------<  80  4.2   |  26  |  0.8    Ca    9.5      2023 05:43  Mg     2.3     11-23    TPro  8.6<H>  /  Alb  4.5  /  TBili  0.7  /  DBili  x   /  AST  37  /  ALT  38  /  AlkPhos  84  11-23    LIVER FUNCTIONS - ( 2023 09:50 )  Alb: 4.5 g/dL / Pro: 8.6 g/dL / ALK PHOS: 84 U/L / ALT: 38 U/L / AST: 37 U/L / GGT: x                 Urinalysis Basic - ( 2023 05:43 )    Color: x / Appearance: x / SG: x / pH: x  Gluc: 80 mg/dL / Ketone: x  / Bili: x / Urobili: x   Blood: x / Protein: x / Nitrite: x   Leuk Esterase: x / RBC: x / WBC x   Sq Epi: x / Non Sq Epi: x / Bacteria: x            EKG - pending  Chart and consultant noted personally reviewed.  Care Discussed with Consultants/Other Providers/ Housestaff [ x] YES [ ] NO   Radiology, labs, old records personally reviewed.    < from: MR Venogram Head w/wo IV Cont (23 @ 12:11) >  these demonstrate abnormal increased signal within the extra-axial   superior parasagittal right frontal area (axial T2 FLAIR sequence series   3 image 29 and 30). Localizing this finding to the coronal postcontrast   sequence series 401 image 121, it appears to represent a thrombosed   cortical vein.    Additional abnormal increased signal measuring 7 mm is present within the   extra-axial lateral aspect of the left cerebellum seen on axial T2 FLAIR   sequence series 3 image 9, just anteromedial to, and separate from, the   the sigmoid sinus. This finding appears to demonstrate enhancement but I   am unclear of what this finding represents.    < end of copied text >

## 2023-11-24 NOTE — CONSULT NOTE ADULT - CONSULT REASON
cortical vein thrombosis  headache
HA, suspected sinus thrombosis, recent delivery, HTN, medical mgmt
post partum day 9 s/p vaginal delivery, cHTN w/ superimposed pre-eclampsia, s/p neuroconsult on 11/22 recommended MRV/MRA however patient left the hospital. Patient returned with worsening headache. studies ordered
chtn with superimposed preeclampsia, postpartum with possible thrombosed cortical vein

## 2023-11-24 NOTE — CONSULT NOTE ADULT - ASSESSMENT
28 year old w/ POCS, postpartum female with H/O preeclampsia and HTN who presented with headache starting following her normal vaginal delivery with epidural (on 11/14/23), pregnancy complicated by chronic HTN with preeclampsia. Headache suspected to be caused by CSF leak headache; however, because of being post-partum and FHx of stroke in early age (father in 30s), imaging was obtained:  MRV, MRA showed possible a thrombosed cortical vein; in addition, abnormal increased signal measuring 7 mm is present within the extra-axial lateral aspect of the left cerebellum (significance unclear).     #cortical veins thrombosis w/ hemorrhage  - on Lovenox 90mg subQ qD  - Coags, routine labs  -  MRI :< from: MR Head w/wo IV Cont (11.24.23 @ 12:52) >  1.  Redemonstrated apparent filling defect within a right superior   cortical vein likely reflecting thrombus. Subjacent leptomeningeal signal   abnormality likely reflecting associated hemorrhage.    2.  There are likely additional tiny thrombi within several left superior   cortical veins as well.    3.  The focal signal abnormality within the left posterior fossa adjacent   to the sigmoid sinus likely reflects small focal hemorrhage.    4.  Mild diffuse dural thickening/enhancement which can be seen in the   setting of intracranial hypotension    < end of copied text >    - Hypercoagulable profile sent  - Neurochecks q4h  - Tylenol for HA  - thrapeutic A/c  - neuro checks    #HTN  - c/w Procardia 60mg in AM + 30mg qHS  - BP monitoring, goal normotensive     #Prophylactic measure.    - DVT ppx: Lovenox   - GI ppx: No need  - Diet: DASH  - Code status: FULL.       Chart and notes personally reviewed.  Care Discussed with Consultants/Other Providers/ Housestaff [ x] YES [ ] NO   Radiology, labs, old records personally reviewed.    discussed w/ housestaff, nursing, case management, neuro team    Attestation Statements:    Attestation Statements:  Risk Statement (NON-critical care).     On this date of service, level of risk to patient is considered: High.     Due to: suspected venous thrombosis w/ hemorrhage, HA, recent pregnancy    Time-based billing (NON-critical care).     55 minutes spent on total encounter. The necessity of the time spent during the encounter on this date of service was due to:     time spent on review of labs, imaging studies, old records, obtaining history, personally examining patient, counselling and communicating with patient/ family, entering orders for medications/tests/etc, discussions with other health care providers, documentation in electronic health records, independent interpretation of labs, imaging/procedure results and care coordination.

## 2023-11-24 NOTE — CONSULT NOTE ADULT - ASSESSMENT
Ms. Wang is a 29 yo now P2, s/p IOL- 23 at 37w0d for cHTN with superimposed preeclampsia, s/p magnesium therapy, now PPD#10, re-presented with relentless headache and admitted to the stroke unit for thrombosed cortical vein and stroke workup, and abnormal increased signal measuring 7 mm is present within the extra-axial lateral aspect of the left cerebellum, which is not clear what it represent. Doing well this morning.     # Possibly Thrombosed Cortical Vein, Increased Signal on Left Cerebellum (unclear what it represents), Headache  - Primary are per neurology team  - Per chart review, plan is to repeat MR brain w/wo contrast. Follow up results  - F/u thrombophilia workup  - C/w Lovenox   - Fioricet for headache    # cHTN with Superimposed Preeclampsia  - S/p magnesium therapy  - Continue Procardia 60XL/30XL    # Postpartum  - Patient is not breast-feeding, no contraindications to medications  - Pain management with tylenol and motrin for cramping as needed  - Postpartum vaginal bleeding is expected. Should not be soaking through a maxi pad every 30 minutes (over the course of 2 hours)   - Ambulate as tolerated. PO diet.     To be d/w Dr Ball Ms. Wang is a 29 yo now P2, s/p IOL- 23 at 37w0d for cHTN with superimposed preeclampsia, s/p magnesium therapy, now PPD#10, re-presented with relentless headache and admitted to the stroke unit for thrombosed cortical vein and stroke workup, and abnormal increased signal measuring 7 mm is present within the extra-axial lateral aspect of the left cerebellum, which is not clear what it represent. Doing well this morning.     # Thrombosed Cortical Vein, Increased Signal on Left Cerebellum (unclear what it represents), Headache  - Primary care per neurology team  - Per chart review, plan is to repeat MR brain w/wo contrast. Follow up results  - F/u thrombophilia workup  - C/w Lovenox   - Fioricet for headache    # cHTN with Superimposed Preeclampsia  - Blood pressures are well controlled at this time. If SBP >160 and/or DBP >110, please let OB team know  - S/p magnesium therapy  - Continue Procardia 60XL/30XL  - Advised patient to monitor her BPs at home twice a day, and to record a log. She should see her OBGYN in about a 1 week to review her BPs and to adjust her Procardia dosing if needed  - Recommend Cardiology evaluation outpatient for EKG/TTE   - Should patient desire future fertility, in next pregnancy, would recommend 81mg ASA daily from 12wk-36wk GA for prevention of preeclampsia    # Postpartum  - Patient is not breast-feeding, no contraindications to medications  - Pain management with tylenol and motrin for cramping as needed  - Postpartum vaginal bleeding is expected. Should not be soaking through a maxi pad every 30 minutes (over the course of 2 hours)   - Ambulate as tolerated. PO diet.     D/w Dr Ball

## 2023-11-24 NOTE — DISCHARGE NOTE PROVIDER - NSDCFUADDAPPT_GEN_ALL_CORE_FT
Our stroke clinic will reach out to you to schedule a follow-up appointment.  Please continue to take Eliquis 5mg every 12 hours until your appointment.

## 2023-11-24 NOTE — DISCHARGE NOTE PROVIDER - HOSPITAL COURSE
Hospital course:  28y Female with PMH       Patient had the following workup done in house:  CT Head:   MR Head Non Contrast:  CT Angio Head:  CT Angio Neck:  [] Echo  [] Labs  [] Other    Physical exam at discharge:  NEUROLOGICAL EXAMINATION:  GENERAL:  Appearance is consistent with chronologic age.   COGNITION/LANGUAGE:  Awake, alert, and oriented to person, place, time and date.  Fluent, intact comprehension, repetition, naming. Recent and remote memory intact.  Fund of knowledge is appropriate.  Nondysarthric.    CRANIAL NERVES:   - Eyes:  Visual acuity intact. Pupils equal round and reactive, no RAPD. EOMI w/o nystagmus, skew or reported double vision. Normal visual field on confrontation. No ptosis/weakness of eyelid closure.   - Face:  Facial sensation normal V1 - 3, no facial asymmetry.    - Ears/Nose/Throat:  Hearing grossly intact b/l to finger rub.  Palate elevates midline.  Tongue and uvula midline.   MOTOR EXAM:  (R/L) 5/5 UE; 5/5 LE.  No observable drift. Normal tone and bulk. No tenderness, twitching, tremors or involuntary movements.  SENSORY EXAM:  Intact to light touch and pinprick, pain, temperature and proprioception and vibration in all extremities.  REFLEXES:   2+ b/l biceps, triceps, patella and achilles.  Plantar response downgoing b/l.  Jaw jerk, Elvis, clonus absent.  CEREBELLUM:  Finger to nose/Heel to knee and shin intact.  No dysmetria.    GAIT: narrow based and normal.  Romberg: negative.    New medications on discharge:  Labs to be followed up:  Imaging to be done as outpatient:  Further outpatient workup:   Hospital course:  28y Female with PMH       Patient had the following workup done in house:  CT Head:   MR Head Non Contrast:  CT Angio Head:  CT Angio Neck:  [] Echo  [] Labs  [] Other    Physical exam at discharge:  NEUROLOGICAL EXAMINATION:  GENERAL:  Appearance is consistent with chronologic age.   COGNITION/LANGUAGE:  Awake, alert, and oriented to person, place, time and date.  Fluent, intact comprehension, repetition, naming. Recent and remote memory intact.  Fund of knowledge is appropriate.  Nondysarthric.    CRANIAL NERVES:   - Eyes:  Visual acuity intact. Pupils equal round and reactive, no RAPD. EOMI w/o nystagmus, skew or reported double vision. Normal visual field on confrontation. No ptosis/weakness of eyelid closure.   - Face:  Facial sensation normal V1 - 3, no facial asymmetry.    - Ears/Nose/Throat:  Hearing grossly intact b/l to finger rub.  Palate elevates midline.  Tongue and uvula midline.   MOTOR EXAM:  (R/L) 5/5 UE; 5/5 LE.  No observable drift. Normal tone and bulk. No tenderness, twitching, tremors or involuntary movements.  SENSORY EXAM:  Intact to light touch and pinprick, pain, temperature and proprioception and vibration in all extremities.  REFLEXES:   2+ b/l biceps, triceps, patella and achilles.  Plantar response downgoing b/l.  Jaw jerk, Elvis, clonus absent.  CEREBELLUM:  Finger to nose/Heel to knee and shin intact.  No dysmetria.    GAIT: narrow based and normal.  Romberg: negative.    New medications on discharge:  Labs to be followed up:  Imaging to be done as outpatient:  Further outpatient workup:      Attending Attestation: Patient seen and examined and agree with above except as noted.  Patients history, notes ,labs, imaging, vitals and meds reviewed personally.  Clinically no complaints today and able to ambulate without headache. No seizures or seizure like episodes reported.  MRI brain reviewed and appears to have signal around the cortical vein thrombosis seen on MRV.  Will need to continue with anticoagulation for 3-6 months and will need followup with hypercoagulable panel as out patient and follow up with Heme/Onc as out patient.  Warning to watch for were given to patient in regards to cortical vein thrombosis as well as anticoagulation use.  Informed to call 911 for worsening headache or new neurological symptoms.   Hospital course:  Ms. Wang is a 28 year old postpartum female with H/O preeclampsia and HTN who presented with headache starting following her normal vaginal delivery with epidural (on 23), pregnancy complicated by chronic HTN with preeclampsia. Headache suspected to be caused by CSF leak headache relieved by Fioricet; however, because of being post-partum and FHx of stroke in early age (father in 30s), imaging (MRI/MRV/MRA) was obtained and she was found to have a thrombus within a superior cortical vein.  She was started on Eliquis 5mg BID for discharge.    Discussed warfarin vs. Eliquis with patient.  Explained that warfarin is the only safe option for breastfeeding.  She does not breast feed but only bottle feeds.  She prefers Eliquis.  She is a nurse.  She received education that if any neurological symptoms such as headache or neurological changes to come to ED immediately.  We are making follow-up appointment with the stroke clinic.    MRA/MRV performed on 2023 and MRI w/wo performed on 2023 showed the followin.  Apparent filling defect within a right superior cortical vein likely reflecting thrombus. Subjacent leptomeningeal signal abnormality likely reflecting associated hemorrhage.  2.  There are likely additional tiny thrombi within several left superior cortical veins as well.  3.  The focal signal abnormality within the left posterior fossa adjacent to the sigmoid sinus likely reflects small focal hemorrhage.  4.  Mild diffuse dural thickening/enhancement which can be seen in the setting of intracranial hypotension.    Physical exam at discharge:  NEUROLOGICAL EXAMINATION:  GENERAL:  Appearance is consistent with chronologic age.   COGNITION/LANGUAGE:  Awake, alert, and oriented to person, place, time and date.  Fluent, intact comprehension, repetition, naming. Recent and remote memory intact.  Fund of knowledge is appropriate.  Nondysarthric.    CRANIAL NERVES:   - Eyes:  Visual acuity intact. Pupils equal round and reactive, no RAPD. EOMI w/o nystagmus, skew or reported double vision. Normal visual field on confrontation. No ptosis/weakness of eyelid closure.   - Face:  Facial sensation normal V1 - 3, no facial asymmetry.    - Ears/Nose/Throat:  Hearing grossly intact b/l to finger rub.  Palate elevates midline.  Tongue and uvula midline.   MOTOR EXAM:  (R/L) 5/5 UE; 5/5 LE.  No observable drift. Normal tone and bulk. No tenderness, twitching, tremors or involuntary movements.  SENSORY EXAM:  Intact to light touch and pinprick, pain, temperature and proprioception and vibration in all extremities.  REFLEXES:   2+ b/l biceps, triceps, patella and achilles.  Plantar response downgoing b/l.  Jaw jerk, Elvis, clonus absent.  CEREBELLUM:  Finger to nose/Heel to knee and shin intact.  No dysmetria.    GAIT: narrow based and normal.  Romberg: negative.    New medications on discharge:  Eliquis 5mg BID  Labs to be followed up:  hypercoagulable workup      Attending Attestation: Patient seen and examined and agree with above except as noted.  Patients history, notes ,labs, imaging, vitals and meds reviewed personally.  Clinically no complaints today and able to ambulate without headache. No seizures or seizure like episodes reported.  MRI brain reviewed and appears to have signal around the cortical vein thrombosis seen on MRV.  Will need to continue with anticoagulation for 3-6 months and will need followup with hypercoagulable panel as out patient and follow up with Heme/Onc as out patient.  Warning to watch for were given to patient in regards to cortical vein thrombosis as well as anticoagulation use.  Informed to call 911 for worsening headache or new neurological symptoms.

## 2023-11-24 NOTE — CONSULT NOTE ADULT - SUBJECTIVE AND OBJECTIVE BOX
PGY3 NOTE    HPI: Ms. Wang is a 27 yo now P2, s/p  23 at 37w0d, PPD#10, admitted to the stroke unit for thrombosed cortical vein. Pt with history of chronic hypertension, not on medications during the pregnancy. Patient then met criteria for superimposed preeclampsia, was induced, s/p magnesium therapy, and was discharged on Procardia 30XL daily. Outpatient, she monitored her BPs and all <140/<90, however due to persistent headaches, her medication was increased to 30XL BID and ten 60XL in AM and 30XL in PM. From the time of discharge, patient endorsed frontal and bilateral headache, that was not improving over the last week. It was worsened when standing/ambulating and mostly improved when laying flat. Tylenol and Motrin did not alleviate her discomfort. Over the course of the week, she denied CP, SOB, changes in vision, RUQ/epigastric pain, edema. Denied N/V, slurred speech, diplopia, speech disturbance, vertigo, neck stiffness, confusion, tingling/numbness, difficulty walking.     Patient first returned to the hospital on PPD #8 () and was evaluated on labor and delivery, headache improved with Fioricet, imaging was recommended but she preferred to do it outpatient. However, she was unable to  the Fioricet from the pharmacy, her headache returned, and so she came back to the hospital the next day on PPD #9 () where head imaging was completed, concerning for thrombosed cortical vein. She reports that only the Fioricet has been able to completely resolve her headache, it is now 0/10 intensity. Able to sleep comfortably, eating and ambulating without difficulty. Currently bottle-feeding.     FHx notable for father having stroke in his late 30s due to migraine. He had his 2nd stroke at the age of 57.     PAST MEDICAL & SURGICAL HISTORY:  PCOS (polycystic ovarian syndrome)  Chronic hypertension  Bilateral inguinal hernia repaired at 1yo    MEDICATIONS  (STANDING):  enoxaparin Injectable 90 milliGRAM(s) SubCutaneous every 12 hours  NIFEdipine XL 30 milliGRAM(s) Oral at bedtime  NIFEdipine XL 60 milliGRAM(s) Oral every 24 hours    FAMILY HISTORY:  Family history of hypertension (Father, Brother)  Family history of stroke (Father) in his 30s and 50s    Allergies: NKDA    Vital Signs Last 24 Hrs  T(C): 36.4 (2023 04:00), Max: 36.9 (2023 20:58)  T(F): 97.5 (2023 04:00), Max: 98.5 (2023 20:58)  HR: 78 (2023 04:00) (61 - 83)  BP: 124/80 (2023 04:00) (113/82 - 160/97)  BP(mean): 91 (2023 04:00) (91 - 135)  RR: 18 (2023 04:00) (18 - 18)  SpO2: 97% (:00) (97% - 99%)    Parameters below as of 2023 04:00  Patient On (Oxygen Delivery Method): room air    PHYSICAL EXAM:  General Appearance - AAOx3, NAD, resting comfortably in bed  Heart - S1S2 regular rate and rhythm  Lung - CTA Bilaterally  Abdomen - Soft, nontender, nondistended, no rebound, no rigidity, no guarding  Neuro - DTR 2+ bilaterally (upper extremities), equal and bilateral sensation/motor in upper and lower extremities  Ext - No swelling or erythema    LABORATORY:                        15.1   7.57  )-----------( 303      ( 2023 09:50 )             46.0     11    137  |  100  |  9<L>  ----------------------------<  89  5.1<H>   |  26  |  0.8    Ca    10.2      2023 09:50  Mg     2.3         TPro  8.6<H>  /  Alb  4.5  /  TBili  0.7  /  DBili  x   /  AST  37  /  ALT  38  /  AlkPhos  84  11    Urinalysis Basic - ( 2023 09:50 )  Color: x / Appearance: x / SG: x / pH: x  Gluc: 89 mg/dL / Ketone: x  / Bili: x / Urobili: x   Blood: x / Protein: x / Nitrite: x   Leuk Esterase: x / RBC: x / WBC x   Sq Epi: x / Non Sq Epi: x / Bacteria: x    RADIOLOGY AND IMAGING:  < from: MR Venogram Head w/wo IV Cont (11.23.23 @ 12:11) >    ACC: [id number] EXAM:  MR VENOGRAM BRAIN WAW IC   ORDERED BY: FRANCA DENSON     ACC: [id number] EXAM:  MR ANGIO BRAIN   ORDERED BY: FRANCA DENSON     *** ADDENDUM # 1 ***    Additional imaging of the brain was performed including axial T2 FLAIR as   well as post-contrast axial, coronal, and sagittal sequences.    These demonstrate abnormal increased signal within the extra-axial   superior parasagittal right frontal area (axial T2 FLAIR sequence series   3 image 29 and 30). Localizing this finding to the coronal postcontrast   sequence series 401 image 121, it appears to represent a thrombosed   cortical vein.    Additional abnormal increased signal measuring 7 mm is present within the   extra-axial lateral aspect of the left cerebellum seen on axial T2 FLAIR   sequence series 3 image 9, just anteromedial to, and separate from, the   the sigmoid sinus. This finding appears to demonstrate enhancement but I   am unclear of what this finding represents.    Findings were discussed by Dr. TALI Menon with Dr. ABBY Dunn on 2023   1:10 PM.    --- End of Report ---    *** END OF ADDENDUM # 1 ***    PROCEDURE DATE:  2023      INTERPRETATION:  INDICATION: Postpartum headaches    TECHNIQUE:  MR angiography of the brain was performed using three dimensional   time-of-flight (3D-TOF) technique. Source images and multiple MIP images   were evaluated.    MR venography of the brain was performed using 3D phase contrast   technique and contrast enhanced technique. Images were acquired in the  sagittal plane and multiple MIP images were evaluated. 10 mL of Gadavist   was administered intravenously. 0 mL was discarded.    COMPARISON: None    FINDINGS:    MRA brain:  There is good flow-related signal within the bilateral anterior, middle,   and posterior cerebral arteries, and within the basilar artery.  The   intracranial portions of the vertebral arteries and internal carotid   arteries are well visualized.    There is no aneurysm.    MRV brain:  There is unremarkable flow enhancement  within the superior sagittal   sinus, transverse sinuses, sigmoid sinuses and internal jugular veins.   Unremarkable flow enhancement is seen within the internal cerebral veins,   vein of Laurent and straight sinus. The visualized cortical veins are   unremarkable.      IMPRESSION:  MRA head: Normal exam.    MRV brain: Unremarkable. No evidence of venous thrombosis.    --- End of Report ---    ***Please see the addendum at the top of this report. It may contain   additional important information or changes.****    JONATAN MENON MD; Attending Radiologist  This document has been electronically signed. 2023 12:53PM  1st Addendum: JONATAN MENON MD; Attending Radiologist  The first addendum was electronically signed on: 2023  1:41PM.    < end of copied text >     PGY3 NOTE    HPI: Ms. Wang is a 27 yo now P2, s/p  23 at 37w0d, PPD#10, admitted to the stroke unit for thrombosed cortical vein. Pt with history of chronic hypertension, not on medications during the pregnancy. Patient then met criteria for superimposed preeclampsia, was induced, s/p magnesium therapy, and was discharged on Procardia 30XL daily. Outpatient, she monitored her BPs and all <140/<90, however due to persistent headaches and elevated diastolic values, her medication was increased to 30XL BID, and then to 60XL in AM and 30XL in PM. From the time of discharge, patient endorsed frontal and bilateral headache, that was not improving over the last week. It was worsened when standing/ambulating and mostly improved when laying flat. Tylenol and Motrin did not alleviate her discomfort. Over the course of the week, she denied CP, SOB, changes in vision, RUQ/epigastric pain, edema. Denied N/V, slurred speech, diplopia, speech disturbance, vertigo, neck stiffness, confusion, tingling/numbness, difficulty walking.     Patient first returned to the hospital on PPD #8 () and was evaluated on labor and delivery, headache improved with Fioricet, imaging was recommended but she preferred to do it outpatient. However, she was unable to  the Fioricet from the pharmacy, her headache returned, and so she came back to the hospital the next day on PPD #9 () where head imaging was completed, concerning for thrombosed cortical vein. She reports that only the Fioricet has been able to completely resolve her headache, it is now 0/10 intensity. Able to sleep comfortably, eating and ambulating without difficulty. Currently bottle-feeding.     FHx notable for father having stroke in his late 30s due to migraine. He had his 2nd stroke at the age of 57.     PAST MEDICAL & SURGICAL HISTORY:  PCOS (polycystic ovarian syndrome)  Chronic hypertension  Bilateral inguinal hernia repaired at 1yo    MEDICATIONS  (STANDING):  enoxaparin Injectable 90 milliGRAM(s) SubCutaneous every 12 hours  NIFEdipine XL 30 milliGRAM(s) Oral at bedtime  NIFEdipine XL 60 milliGRAM(s) Oral every 24 hours    FAMILY HISTORY:  Family history of hypertension (Father, Brother)  Family history of stroke (Father) in his 30s and 50s    Allergies: NKDA    Vital Signs Last 24 Hrs  T(C): 36.4 (2023 04:00), Max: 36.9 (2023 20:58)  T(F): 97.5 (2023 04:00), Max: 98.5 (2023 20:58)  HR: 78 (2023 04:00) (61 - 83)  BP: 124/80 (2023 04:00) (113/82 - 160/97)  BP(mean): 91 (2023 04:00) (91 - 135)  RR: 18 (2023 04:00) (18 - 18)  SpO2: 97% (2023 04:00) (97% - 99%)    Parameters below as of 2023 04:00  Patient On (Oxygen Delivery Method): room air    PHYSICAL EXAM:  General Appearance - AAOx3, NAD, resting comfortably in bed  Heart - S1S2 regular rate and rhythm  Lung - CTA Bilaterally  Abdomen - Soft, nontender, nondistended, no rebound, no rigidity, no guarding  Neuro - DTR 2+ bilaterally (upper extremities), equal and bilateral sensation/motor in upper and lower extremities  Ext - No swelling or erythema    LABORATORY:                        15.1   7.57  )-----------( 303      ( 2023 09:50 )             46.0     11-    137  |  100  |  9<L>  ----------------------------<  89  5.1<H>   |  26  |  0.8    Ca    10.2      2023 09:50  Mg     2.3         TPro  8.6<H>  /  Alb  4.5  /  TBili  0.7  /  DBili  x   /  AST  37  /  ALT  38  /  AlkPhos  84  11    Urinalysis Basic - ( 2023 09:50 )  Color: x / Appearance: x / SG: x / pH: x  Gluc: 89 mg/dL / Ketone: x  / Bili: x / Urobili: x   Blood: x / Protein: x / Nitrite: x   Leuk Esterase: x / RBC: x / WBC x   Sq Epi: x / Non Sq Epi: x / Bacteria: x    RADIOLOGY AND IMAGING:  < from: MR Venogram Head w/wo IV Cont (23 @ 12:11) >    ACC: [id number] EXAM:  MR VENOGRAM BRAIN WAW IC   ORDERED BY: FRANCA DENSON     ACC: [id number] EXAM:  MR ANGIO BRAIN   ORDERED BY: RFANCA DENSON     *** ADDENDUM # 1 ***    Additional imaging of the brain was performed including axial T2 FLAIR as   well as post-contrast axial, coronal, and sagittal sequences.    These demonstrate abnormal increased signal within the extra-axial   superior parasagittal right frontal area (axial T2 FLAIR sequence series   3 image 29 and 30). Localizing this finding to the coronal postcontrast   sequence series 401 image 121, it appears to represent a thrombosed   cortical vein.    Additional abnormal increased signal measuring 7 mm is present within the   extra-axial lateral aspect of the left cerebellum seen on axial T2 FLAIR   sequence series 3 image 9, just anteromedial to, and separate from, the   the sigmoid sinus. This finding appears to demonstrate enhancement but I   am unclear of what this finding represents.    Findings were discussed by Dr. TALI Menon with Dr. ABBY Dunn on 2023   1:10 PM.    --- End of Report ---    *** END OF ADDENDUM # 1 ***    PROCEDURE DATE:  2023      INTERPRETATION:  INDICATION: Postpartum headaches    TECHNIQUE:  MR angiography of the brain was performed using three dimensional   time-of-flight (3D-TOF) technique. Source images and multiple MIP images   were evaluated.    MR venography of the brain was performed using 3D phase contrast   technique and contrast enhanced technique. Images were acquired in the  sagittal plane and multiple MIP images were evaluated. 10 mL of Gadavist   was administered intravenously. 0 mL was discarded.    COMPARISON: None    FINDINGS:    MRA brain:  There is good flow-related signal within the bilateral anterior, middle,   and posterior cerebral arteries, and within the basilar artery.  The   intracranial portions of the vertebral arteries and internal carotid   arteries are well visualized.    There is no aneurysm.    MRV brain:  There is unremarkable flow enhancement  within the superior sagittal   sinus, transverse sinuses, sigmoid sinuses and internal jugular veins.   Unremarkable flow enhancement is seen within the internal cerebral veins,   vein of Laurent and straight sinus. The visualized cortical veins are   unremarkable.      IMPRESSION:  MRA head: Normal exam.    MRV brain: Unremarkable. No evidence of venous thrombosis.    --- End of Report ---    ***Please see the addendum at the top of this report. It may contain   additional important information or changes.****    JONATAN MENON MD; Attending Radiologist  This document has been electronically signed. 2023 12:53PM  1st Addendum: JONATAN MENON MD; Attending Radiologist  The first addendum was electronically signed on: 2023  1:41PM.    < end of copied text >

## 2023-11-24 NOTE — PHYSICAL THERAPY INITIAL EVALUATION ADULT - SPECIFY REASON(S)
Upon assessment pt is independent with transfers and ambulation with no assistive device . Will not require skilled PT at this time.

## 2023-11-24 NOTE — OCCUPATIONAL THERAPY INITIAL EVALUATION ADULT - GENERAL OBSERVATIONS, REHAB EVAL
Pt encountered semi rahman in bed in NAD +IV locked +tele. Pt agreed to OT with PT Wen, no reported any discomfort/dizziness/pain throughout, BP taken 147/105 seated at eob. Pt left semi rahman in bed in NAD, RN aware.

## 2023-11-24 NOTE — DISCHARGE NOTE PROVIDER - NSDCCPCAREPLAN_GEN_ALL_CORE_FT
PRINCIPAL DISCHARGE DIAGNOSIS  Diagnosis: Cerebral venous thrombosis  Assessment and Plan of Treatment: Cerebral venous thrombosis is a rare blood clot in one of your brain’s veins. Medicines and procedures can treat it. The outlook is good for those who get a prompt diagnosis and treatment. Prompt diagnosis and treatment give you the best chance of avoiding life-threatening complications.  What are the symptoms? Cerebral venous sinus thrombosis symptoms depend on how bad the clot is and its location. Symptoms include: Headache that keeps getting worse (most common symptom). Seizures. Altered mental status and/or new neurologic deficits in the case of a large brain bleed. What causes cerebral venous sinus thrombosis. Cerebral venous sinus thrombosis causes include: Being pregnant. Taking oral contraceptives (birth control pills). Experiencing a trauma. Having an inherited condition (thrombophilia) that makes your blood clot easily. Having an infection (for example, a COVID-19 infection). You may need to take warfarin or a direct oral anticoagulant (such as rivaroxaban, apixaban or dabigatran) for three to 12 months, depending on what caused your cerebral venous sinus thrombosis. People with thrombophilia may need to take warfarin for the rest of their lives.  We have sent the labwork to establish whether you have an inherited thrombophilia condition.

## 2023-11-24 NOTE — PROGRESS NOTE ADULT - SUBJECTIVE AND OBJECTIVE BOX
Hx:  Ms. Wang is a 35 year old postpartum female with H/O preeclampsia and HTN who presented with headache starting following her normal vaginal delivery with epidural (on 11/14/23), pregnancy complicated by chronic HTN with preeclampsia. Headache suspected to be caused by CSF leak headache relieved by Fioricet; however, because of being post-partum and FHx of stroke in early age (father in 30s), imaging was obtained:  MRV, MRA showed possible a thrombosed cortical vein; in addition, abnormal increased signal measuring 7 mm is present within the extra-axial lateral aspect of the left cerebellum (significance unclear).     SUBJECTIVE / INTERVAL HPI: Patient seen and examined at bedside.  Has headache this AM, received Fioricet.    VITAL SIGNS:  Vital Signs Last 24 Hrs  T(C): 36.2 (24 Nov 2023 08:00), Max: 36.9 (23 Nov 2023 20:58)  T(F): 97.1 (24 Nov 2023 08:00), Max: 98.5 (23 Nov 2023 20:58)  HR: 68 (24 Nov 2023 08:00) (61 - 83)  BP: 113/80 (24 Nov 2023 08:00) (113/80 - 160/97)  BP(mean): 95 (24 Nov 2023 08:00) (91 - 135)  RR: 18 (24 Nov 2023 04:00) (18 - 18)  SpO2: 98% (24 Nov 2023 08:00) (97% - 99%)    Parameters below as of 24 Nov 2023 08:00  Patient On (Oxygen Delivery Method): room air      I&O's Summary      PHYSICAL EXAM:  NEUROLOGICAL EXAMINATION:  GENERAL:  Appearance is consistent with chronologic age.   COGNITION/LANGUAGE:  Awake, alert, and oriented to person, place, time and date.  Fluent, intact comprehension, repetition, naming. Recent and remote memory intact.  Fund of knowledge is appropriate.  Nondysarthric.    CRANIAL NERVES:   - Eyes:  Visual acuity intact. Pupils equal round and reactive, no RAPD. EOMI w/o nystagmus, skew or reported double vision. Normal visual field on confrontation. No ptosis/weakness of eyelid closure.   - Face:  Facial sensation normal V1 - 3, no facial asymmetry.    - Ears/Nose/Throat:  Hearing grossly intact b/l to finger rub.  Palate elevates midline.  Tongue and uvula midline.   MOTOR EXAM:  (R/L) 5/5 UE; 5/5 LE.  No observable drift. Normal tone and bulk. No tenderness, twitching, tremors or involuntary movements.  SENSORY EXAM:  Intact to light touch and pinprick, pain, temperature and proprioception and vibration in all extremities.  REFLEXES:   2+ b/l biceps, triceps, patella and achilles.  Plantar response downgoing b/l.  Jaw jerk, Elvis, clonus absent.  CEREBELLUM:  Finger to nose/Heel to knee and shin intact.  No dysmetria.    GAIT: narrow based and normal.  Romberg: negative.      MEDICATIONS:  MEDICATIONS  (STANDING):  enoxaparin Injectable 90 milliGRAM(s) SubCutaneous every 12 hours  NIFEdipine XL 30 milliGRAM(s) Oral at bedtime  NIFEdipine XL 60 milliGRAM(s) Oral every 24 hours    MEDICATIONS  (PRN):      ALLERGIES:  Allergies    No Known Allergies    Intolerances        LABS:                        15.1   7.57  )-----------( 303      ( 23 Nov 2023 09:50 )             46.0     11-24    142  |  105  |  10  ----------------------------<  80  4.2   |  26  |  0.8    Ca    9.5      24 Nov 2023 05:43  Mg     2.3     11-23    TPro  8.6<H>  /  Alb  4.5  /  TBili  0.7  /  DBili  x   /  AST  37  /  ALT  38  /  AlkPhos  84  11-23      Urinalysis Basic - ( 24 Nov 2023 05:43 )    Color: x / Appearance: x / SG: x / pH: x  Gluc: 80 mg/dL / Ketone: x  / Bili: x / Urobili: x   Blood: x / Protein: x / Nitrite: x   Leuk Esterase: x / RBC: x / WBC x   Sq Epi: x / Non Sq Epi: x / Bacteria: x      CAPILLARY BLOOD GLUCOSE          RADIOLOGY & ADDITIONAL TESTS: Reviewed.   Hx:  Ms. Wang is a 28 year old postpartum female with H/O preeclampsia and HTN who presented with headache starting following her normal vaginal delivery with epidural (on 11/14/23), pregnancy complicated by chronic HTN with preeclampsia. Headache suspected to be caused by CSF leak headache relieved by Fioricet; however, because of being post-partum and FHx of stroke in early age (father in 30s), imaging was obtained:  MRV, MRA showed possible a thrombosed cortical vein; in addition, abnormal increased signal measuring 7 mm is present within the extra-axial lateral aspect of the left cerebellum (significance unclear).     SUBJECTIVE / INTERVAL HPI: Patient seen and examined at bedside.  Has headache this AM, received Fioricet.    VITAL SIGNS:  Vital Signs Last 24 Hrs  T(C): 36.2 (24 Nov 2023 08:00), Max: 36.9 (23 Nov 2023 20:58)  T(F): 97.1 (24 Nov 2023 08:00), Max: 98.5 (23 Nov 2023 20:58)  HR: 68 (24 Nov 2023 08:00) (61 - 83)  BP: 113/80 (24 Nov 2023 08:00) (113/80 - 160/97)  BP(mean): 95 (24 Nov 2023 08:00) (91 - 135)  RR: 18 (24 Nov 2023 04:00) (18 - 18)  SpO2: 98% (24 Nov 2023 08:00) (97% - 99%)    Parameters below as of 24 Nov 2023 08:00  Patient On (Oxygen Delivery Method): room air      I&O's Summary      PHYSICAL EXAM:  NEUROLOGICAL EXAMINATION:  GENERAL:  Appearance is consistent with chronologic age.   COGNITION/LANGUAGE:  Awake, alert, and oriented to person, place, time and date.  Fluent, intact comprehension, repetition, naming. Recent and remote memory intact.  Fund of knowledge is appropriate.  Nondysarthric.    CRANIAL NERVES:   - Eyes:  Visual acuity intact. Pupils equal round and reactive, no RAPD. EOMI w/o nystagmus, skew or reported double vision. Normal visual field on confrontation. No ptosis/weakness of eyelid closure.   - Face:  Facial sensation normal V1 - 3, no facial asymmetry.    - Ears/Nose/Throat:  Hearing grossly intact b/l to finger rub.  Palate elevates midline.  Tongue and uvula midline.   MOTOR EXAM:  (R/L) 5/5 UE; 5/5 LE.  No observable drift. Normal tone and bulk. No tenderness, twitching, tremors or involuntary movements.  SENSORY EXAM:  Intact to light touch and pinprick, pain, temperature and proprioception and vibration in all extremities.  REFLEXES:   2+ b/l biceps, triceps, patella and achilles.  Plantar response downgoing b/l.  Jaw jerk, Elvis, clonus absent.  CEREBELLUM:  Finger to nose/Heel to knee and shin intact.  No dysmetria.    GAIT: narrow based and normal.  Romberg: negative.      MEDICATIONS:  MEDICATIONS  (STANDING):  enoxaparin Injectable 90 milliGRAM(s) SubCutaneous every 12 hours  NIFEdipine XL 30 milliGRAM(s) Oral at bedtime  NIFEdipine XL 60 milliGRAM(s) Oral every 24 hours    MEDICATIONS  (PRN):      ALLERGIES:  Allergies    No Known Allergies    Intolerances        LABS:                        15.1   7.57  )-----------( 303      ( 23 Nov 2023 09:50 )             46.0     11-24    142  |  105  |  10  ----------------------------<  80  4.2   |  26  |  0.8    Ca    9.5      24 Nov 2023 05:43  Mg     2.3     11-23    TPro  8.6<H>  /  Alb  4.5  /  TBili  0.7  /  DBili  x   /  AST  37  /  ALT  38  /  AlkPhos  84  11-23      Urinalysis Basic - ( 24 Nov 2023 05:43 )    Color: x / Appearance: x / SG: x / pH: x  Gluc: 80 mg/dL / Ketone: x  / Bili: x / Urobili: x   Blood: x / Protein: x / Nitrite: x   Leuk Esterase: x / RBC: x / WBC x   Sq Epi: x / Non Sq Epi: x / Bacteria: x      CAPILLARY BLOOD GLUCOSE          RADIOLOGY & ADDITIONAL TESTS: Reviewed.   Hx:  Ms. Wang is a 28 year old postpartum female with H/O preeclampsia and HTN who presented with headache starting following her normal vaginal delivery with epidural (on 11/14/23), pregnancy complicated by chronic HTN with preeclampsia. Headache suspected to be caused by CSF leak headache relieved by Fioricet; however, because of being post-partum and FHx of stroke in early age (father in 30s), imaging was obtained:  MRV, MRA showed possible a thrombosed cortical vein; in addition, abnormal increased signal measuring 7 mm is present within the extra-axial lateral aspect of the left cerebellum (significance unclear).     SUBJECTIVE / INTERVAL HPI: Patient seen and examined at bedside.  Has mild headache this AM.    VITAL SIGNS:  Vital Signs Last 24 Hrs  T(C): 36.2 (24 Nov 2023 08:00), Max: 36.9 (23 Nov 2023 20:58)  T(F): 97.1 (24 Nov 2023 08:00), Max: 98.5 (23 Nov 2023 20:58)  HR: 68 (24 Nov 2023 08:00) (61 - 83)  BP: 113/80 (24 Nov 2023 08:00) (113/80 - 160/97)  BP(mean): 95 (24 Nov 2023 08:00) (91 - 135)  RR: 18 (24 Nov 2023 04:00) (18 - 18)  SpO2: 98% (24 Nov 2023 08:00) (97% - 99%)    Parameters below as of 24 Nov 2023 08:00  Patient On (Oxygen Delivery Method): room air      I&O's Summary      PHYSICAL EXAM:  NEUROLOGICAL EXAMINATION:  GENERAL:  Appearance is consistent with chronologic age.   COGNITION/LANGUAGE:  Awake, alert, and oriented to person, place, time and date.  Fluent, intact comprehension, repetition, naming. Recent and remote memory intact.  Fund of knowledge is appropriate.  Nondysarthric.    CRANIAL NERVES:   - Eyes:  Visual acuity intact. Pupils equal round and reactive, no RAPD. EOMI w/o nystagmus, skew or reported double vision. Normal visual field on confrontation. No ptosis/weakness of eyelid closure.   - Face:  Facial sensation normal V1 - 3, no facial asymmetry.    - Ears/Nose/Throat:  Hearing grossly intact b/l to finger rub.  Palate elevates midline.  Tongue and uvula midline.   MOTOR EXAM:  (R/L) 5/5 UE; 5/5 LE.  No observable drift. Normal tone and bulk. No tenderness, twitching, tremors or involuntary movements.  SENSORY EXAM:  Intact to light touch and pinprick, pain, temperature and proprioception and vibration in all extremities.  REFLEXES:   2+ b/l biceps, triceps, patella and achilles.  Plantar response downgoing b/l.  Jaw jerk, Elivs, clonus absent.  CEREBELLUM:  Finger to nose/Heel to knee and shin intact.  No dysmetria.    GAIT: narrow based and normal.  Romberg: negative.      MEDICATIONS:  MEDICATIONS  (STANDING):  enoxaparin Injectable 90 milliGRAM(s) SubCutaneous every 12 hours  NIFEdipine XL 30 milliGRAM(s) Oral at bedtime  NIFEdipine XL 60 milliGRAM(s) Oral every 24 hours    MEDICATIONS  (PRN):      ALLERGIES:  Allergies    No Known Allergies    Intolerances        LABS:                        15.1   7.57  )-----------( 303      ( 23 Nov 2023 09:50 )             46.0     11-24    142  |  105  |  10  ----------------------------<  80  4.2   |  26  |  0.8    Ca    9.5      24 Nov 2023 05:43  Mg     2.3     11-23    TPro  8.6<H>  /  Alb  4.5  /  TBili  0.7  /  DBili  x   /  AST  37  /  ALT  38  /  AlkPhos  84  11-23      Urinalysis Basic - ( 24 Nov 2023 05:43 )    Color: x / Appearance: x / SG: x / pH: x  Gluc: 80 mg/dL / Ketone: x  / Bili: x / Urobili: x   Blood: x / Protein: x / Nitrite: x   Leuk Esterase: x / RBC: x / WBC x   Sq Epi: x / Non Sq Epi: x / Bacteria: x      CAPILLARY BLOOD GLUCOSE          RADIOLOGY & ADDITIONAL TESTS: Reviewed.

## 2023-11-24 NOTE — DISCHARGE NOTE NURSING/CASE MANAGEMENT/SOCIAL WORK - PATIENT PORTAL LINK FT
You can access the FollowMyHealth Patient Portal offered by [facility] by registering at the following website: [email or web address]. By joining [facility]’s FollowMyHealth portal, you will also be able to view your health information using other applications (apps) compatible with our system.

## 2023-11-24 NOTE — SWALLOW BEDSIDE ASSESSMENT ADULT - SLP PERTINENT HISTORY OF CURRENT PROBLEM
Patient is a 27 yo P2, s/p  23 at 37w0d, PPD#8 presents for evaluation of headache. Pregnancy complicated by chronic hypertension with superimposed preeclampsia. Patient s/p magnesium treatment. Patient discharged home on Procardia 30XL daily. She was increased to Procardia 30XL BID outpatient due to uncontrolled BPs. She was increased to Procardia 60XL in AM and Procardia 30XL in PM today. She took Procardia 60XL today at 0900. Patient reports on discharged she had a mild/moderate frontal headache 5/10 intensity. Reports her headache never fully resolved and has been fluctuating. Reports 1 day in the last week when she had no headache while she was on bed rest. Tylenol and motrin have not alleviated her discomfort. Reports her headache resolved when laying down at night and she is able to sleep through the night.

## 2023-11-24 NOTE — OCCUPATIONAL THERAPY INITIAL EVALUATION ADULT - PERTINENT HX OF CURRENT PROBLEM, REHAB EVAL
Patient is a 29 yo P2, s/p  23 at 37w0d, PPD#8 presents for evaluation of headache. Pregnancy complicated by chronic hypertension with superimposed preeclampsia. Patient s/p magnesium treatment. Patient discharged home on Procardia 30XL daily. She was increased to Procardia 30XL BID outpatient due to uncontrolled BPs. She was increased to Procardia 60XL in AM and Procardia 30XL in PM today. She took Procardia 60XL today at 0900. Patient reports on discharged she had a mild/moderate frontal headache 5/10 intensity. Reports her headache never fully resolved and has been fluctuating. Reports 1 day in the last week when she had no headache while she was on bed rest. Tylenol and motrin have not alleviated her discomfort. Reports her headache resolved when laying down at night and she is able to sleep through the night. Her headache gets worse when she stands and walks around. She denies any blurry vision, slurred speech, neck stiffness, confusion, tingling/numbness, difficulty walking.

## 2023-11-24 NOTE — PROGRESS NOTE ADULT - ASSESSMENT
Ms. Wang is a 35 year old postpartum female with H/O preeclampsia and HTN who presented with headache starting following her normal vaginal delivery with epidural (on 11/14/23), pregnancy complicated by chronic HTN with preeclampsia. Headache suspected to be caused by CSF leak headache; however, because of being post-partum and FHx of stroke in early age (father in 30s), imaging was obtained:  MRV, MRA showed possible a thrombosed cortical vein; in addition, abnormal increased signal measuring 7 mm is present within the extra-axial lateral aspect of the left cerebellum (significance unclear).     #cortical vein thrombosis  - Admit to Stroke unit  - MR brain w/wo   - c/w Lovenox 90mg subQ qD  - Coags, routine labs  - Hypercoagulable profile sent  - Neurochecks q4h  - Fioricet 1 tab q6h PRN for HA    #HTN  - c/w Procardia 60mg in AM + 30mg qHS  - BP monitoring, goal normotensive     #Prophylactic measure.    - DVT ppx: Lovenox q12h  - GI ppx: No need  - Diet: DASH  - Activity: Seizure/ fall protocol. constant obs   - Dispo: Stroke unit  - Code status: FULL.  Ms. Wang is a 35 year old postpartum female with H/O preeclampsia and HTN who presented with headache starting following her normal vaginal delivery with epidural (on 11/14/23), pregnancy complicated by chronic HTN with preeclampsia. Headache suspected to be caused by CSF leak headache; however, because of being post-partum and FHx of stroke in early age (father in 30s), imaging was obtained:  MRV, MRA showed possible a thrombosed cortical vein; in addition, abnormal increased signal measuring 7 mm is present within the extra-axial lateral aspect of the left cerebellum (significance unclear).     #cortical vein thrombosis  - c/w Lovenox 90mg subQ qD  - Coags, routine labs  - Hypercoagulable profile sent  - Neurochecks q4h  - Fioricet 1 tab q6h PRN for HA    #HTN  - c/w Procardia 60mg in AM + 30mg qHS  - BP monitoring, goal normotensive     #Prophylactic measure.    - DVT ppx: Lovenox q12h  - GI ppx: No need  - Diet: DASH  - Activity: Seizure/ fall protocol. constant obs   - Dispo: Stroke unit  - Code status: FULL.  Ms. Wang is a 28 year old postpartum female with H/O preeclampsia and HTN who presented with headache starting following her normal vaginal delivery with epidural (on 11/14/23), pregnancy complicated by chronic HTN with preeclampsia. Headache suspected to be caused by CSF leak headache; however, because of being post-partum and FHx of stroke in early age (father in 30s), imaging was obtained:  MRV, MRA showed possible a thrombosed cortical vein; in addition, abnormal increased signal measuring 7 mm is present within the extra-axial lateral aspect of the left cerebellum (significance unclear).     #cortical vein thrombosis  - c/w Lovenox 90mg subQ qD  - Coags, routine labs  - Hypercoagulable profile sent  - Neurochecks q4h  - Fioricet 1 tab q6h PRN for HA    #HTN  - c/w Procardia 60mg in AM + 30mg qHS  - BP monitoring, goal normotensive     #Prophylactic measure.    - DVT ppx: Lovenox q12h  - GI ppx: No need  - Diet: DASH  - Activity: Seizure/ fall protocol. constant obs   - Dispo: Stroke unit  - Code status: FULL.  Ms. Wang is a 28 year old postpartum female with H/O preeclampsia and HTN who presented with headache starting following her normal vaginal delivery with epidural (on 11/14/23), pregnancy complicated by chronic HTN with preeclampsia. Headache suspected to be caused by CSF leak headache; however, because of being post-partum and FHx of stroke in early age (father in 30s), imaging was obtained:  MRV, MRA showed possible a thrombosed cortical vein; in addition, abnormal increased signal measuring 7 mm is present within the extra-axial lateral aspect of the left cerebellum (significance unclear).     #cortical vein thrombosis  - c/w Lovenox 90mg subQ qD  - Coags, routine labs  - pending MRI to assess whether thrombus present; if so, will need to continue AC at home  - Hypercoagulable profile sent  - Neurochecks q4h  - Tylenol for HA    #HTN  - c/w Procardia 60mg in AM + 30mg qHS  - BP monitoring, goal normotensive     #Prophylactic measure.    - DVT ppx: Lovenox q12h  - GI ppx: No need  - Diet: DASH  - Activity: Seizure/ fall protocol. constant obs   - Dispo: Stroke unit  - Code status: FULL.

## 2023-11-24 NOTE — CONSULT NOTE ADULT - ASSESSMENT
IMPRESSION: Rehab of cortical vein thrombosis, headache.  She amb with me 150 ft briskly and independently. She is a pediatric nurse.      PRECAUTIONS: [x  ] Cardiac  [  ] Respiratory  [  ] Seizures [  ] Contact Isolation  [  ] Droplet Isolation  [  ] Other    Weight Bearing Status:     RECOMMENDATION:    Out of Bed to Chair     DVT/Decubiti Prophylaxis    REHAB PLAN:     [ x  ] Bedside P/T 3-5 times a week   [   ]   Bedside O/T  2-3 times a week             [   ] No Rehab Therapy Indicated                   [   ]  Speech Therapy   Conditioning/ROM                                    ADL  Bed Mobility                                               Conditioning/ROM  Transfers                                                     Bed Mobility  Sitting /Standing Balance                         Transfers                                        Gait Training                                               Sitting/Standing Balance  Stair Training [   ]Applicable                    Home equipment Eval                                                                        Splinting  [   ] Only      GOALS:   ADL   [ x]   Independent                    Transfers  [  x ] Independent                          Ambulation  [ x  ] Independent     [    ] With device                            [   ]  CG                                                         [   ]  CG                                                                  [   ] CG                            [    ] Min A                                                   [   ] Min A                                                              [   ] Min  A          DISCHARGE PLAN:   [   ]  Good candidate for Intensive Rehabilitation/Hospital based-[facility]A [facility]                                             Will tolerate 3hrs Intensive Rehab Daily                                       [    ]  Short Term Rehab in Skilled Nursing Facility                                       [ x   ]  Home with Outpatient or VN services                                         [    ]  Possible Candidate for Intensive Hospital based Rehab

## 2023-11-25 LAB
B2 GLYCOPROT1 AB SER QL: NEGATIVE — SIGNIFICANT CHANGE UP
CARDIOLIPIN AB SER-ACNC: NEGATIVE — SIGNIFICANT CHANGE UP
DSDNA AB FLD-ACNC: <0.2 AI — SIGNIFICANT CHANGE UP
DSDNA AB FLD-ACNC: <0.2 AI — SIGNIFICANT CHANGE UP
DSDNA AB SER-ACNC: <12 IU/ML — SIGNIFICANT CHANGE UP
DSDNA AB SER-ACNC: <12 IU/ML — SIGNIFICANT CHANGE UP
ENA SS-A AB FLD IA-ACNC: <0.2 AI — SIGNIFICANT CHANGE UP
ENA SS-A AB FLD IA-ACNC: <0.2 AI — SIGNIFICANT CHANGE UP

## 2023-11-26 LAB
AUTO DIFF PNL BLD: NEGATIVE — SIGNIFICANT CHANGE UP
AUTO DIFF PNL BLD: NEGATIVE — SIGNIFICANT CHANGE UP
C-ANCA SER-ACNC: NEGATIVE — SIGNIFICANT CHANGE UP
C-ANCA SER-ACNC: NEGATIVE — SIGNIFICANT CHANGE UP
P-ANCA SER-ACNC: NEGATIVE — SIGNIFICANT CHANGE UP
P-ANCA SER-ACNC: NEGATIVE — SIGNIFICANT CHANGE UP

## 2023-11-27 ENCOUNTER — APPOINTMENT (OUTPATIENT)
Dept: ANTEPARTUM | Facility: CLINIC | Age: 28
End: 2023-11-27

## 2023-11-27 ENCOUNTER — APPOINTMENT (OUTPATIENT)
Dept: MATERNAL FETAL MEDICINE | Facility: CLINIC | Age: 28
End: 2023-11-27

## 2023-11-27 LAB
ANA TITR SER: NEGATIVE — SIGNIFICANT CHANGE UP
ANA TITR SER: NEGATIVE — SIGNIFICANT CHANGE UP
AT III ACT/NOR PPP CHRO: 118 % — SIGNIFICANT CHANGE UP (ref 85–135)
AT III ACT/NOR PPP CHRO: 118 % — SIGNIFICANT CHANGE UP (ref 85–135)
AT III ACT/NOR PPP CHRO: 120 % — SIGNIFICANT CHANGE UP (ref 85–135)
AT III ACT/NOR PPP CHRO: 120 % — SIGNIFICANT CHANGE UP (ref 85–135)
DRVVT RATIO: 1 RATIO — SIGNIFICANT CHANGE UP (ref 0–1.21)
DRVVT RATIO: 1 RATIO — SIGNIFICANT CHANGE UP (ref 0–1.21)
DRVVT RATIO: 1.03 RATIO — SIGNIFICANT CHANGE UP (ref 0–1.21)
DRVVT RATIO: 1.03 RATIO — SIGNIFICANT CHANGE UP (ref 0–1.21)
DRVVT SCREEN TO CONFIRM RATIO: SIGNIFICANT CHANGE UP
NORMALIZED SCT PPP-RTO: 1.1 RATIO — SIGNIFICANT CHANGE UP (ref 0–1.16)
NORMALIZED SCT PPP-RTO: SIGNIFICANT CHANGE UP
PROT C ACT/NOR PPP: 123 % — SIGNIFICANT CHANGE UP (ref 65–129)
PROT C ACT/NOR PPP: 123 % — SIGNIFICANT CHANGE UP (ref 65–129)
PROT C ACT/NOR PPP: 130 % — HIGH (ref 65–129)
PROT C ACT/NOR PPP: 130 % — HIGH (ref 65–129)

## 2023-11-28 LAB
APCR PPP: 2.39 RATIO — SIGNIFICANT CHANGE UP
APCR PPP: 2.39 RATIO — SIGNIFICANT CHANGE UP
APCR PPP: 2.49 RATIO — SIGNIFICANT CHANGE UP
APCR PPP: 2.49 RATIO — SIGNIFICANT CHANGE UP
PROT S FREE PPP-ACNC: 54 % — LOW (ref 63–140)
PROT S FREE PPP-ACNC: 54 % — LOW (ref 63–140)
PROT S FREE PPP-ACNC: 62 % — LOW (ref 63–140)
PROT S FREE PPP-ACNC: 62 % — LOW (ref 63–140)
PTR INTERPRETATION: SIGNIFICANT CHANGE UP
PTR INTERPRETATION: SIGNIFICANT CHANGE UP

## 2023-11-30 DIAGNOSIS — E28.2 POLYCYSTIC OVARIAN SYNDROME: ICD-10-CM

## 2023-12-04 ENCOUNTER — APPOINTMENT (OUTPATIENT)
Dept: ANTEPARTUM | Facility: CLINIC | Age: 28
End: 2023-12-04

## 2023-12-04 ENCOUNTER — APPOINTMENT (OUTPATIENT)
Dept: MATERNAL FETAL MEDICINE | Facility: CLINIC | Age: 28
End: 2023-12-04

## 2023-12-26 ENCOUNTER — NON-APPOINTMENT (OUTPATIENT)
Age: 28
End: 2023-12-26

## 2024-01-10 ENCOUNTER — APPOINTMENT (OUTPATIENT)
Dept: OBGYN | Facility: CLINIC | Age: 29
End: 2024-01-10
Payer: COMMERCIAL

## 2024-01-10 VITALS
DIASTOLIC BLOOD PRESSURE: 90 MMHG | BODY MASS INDEX: 28.88 KG/M2 | WEIGHT: 195 LBS | SYSTOLIC BLOOD PRESSURE: 127 MMHG | HEIGHT: 69 IN

## 2024-01-10 DIAGNOSIS — Z67.41 TYPE O BLOOD, RH NEGATIVE: ICD-10-CM

## 2024-01-10 DIAGNOSIS — O26.891 OTHER SPECIFIED PREGNANCY RELATED CONDITIONS, FIRST TRIMESTER: ICD-10-CM

## 2024-01-10 DIAGNOSIS — Z67.91 OTHER SPECIFIED PREGNANCY RELATED CONDITIONS, FIRST TRIMESTER: ICD-10-CM

## 2024-01-10 DIAGNOSIS — N97.9 FEMALE INFERTILITY, UNSPECIFIED: ICD-10-CM

## 2024-01-10 DIAGNOSIS — Z30.09 ENCOUNTER FOR OTHER GENERAL COUNSELING AND ADVICE ON CONTRACEPTION: ICD-10-CM

## 2024-01-10 PROCEDURE — 0503F POSTPARTUM CARE VISIT: CPT

## 2024-01-10 RX ORDER — NIFEDIPINE 90 MG/1
90 TABLET, EXTENDED RELEASE ORAL DAILY
Qty: 90 | Refills: 0 | Status: COMPLETED | COMMUNITY
Start: 2024-01-10 | End: 2024-01-10

## 2024-01-10 RX ORDER — DOXYLAMINE SUCCINATE AND PYRIDOXINE HYDROCHLORIDE 10; 10 MG/1; MG/1
10-10 TABLET, DELAYED RELEASE ORAL
Qty: 60 | Refills: 2 | Status: COMPLETED | COMMUNITY
Start: 2020-12-15 | End: 2024-01-10

## 2024-01-10 RX ORDER — RHO(D) IMMUNE GLOBULIN (HUMAN) 1500 [IU]/1
1500 SOLUTION INTRAMUSCULAR
Qty: 1 | Refills: 0 | Status: COMPLETED | COMMUNITY
Start: 2023-09-20 | End: 2024-01-10

## 2024-01-10 RX ORDER — CLOMIPHENE CITRATE 50 MG/1
50 TABLET ORAL DAILY
Qty: 5 | Refills: 0 | Status: COMPLETED | COMMUNITY
Start: 2023-02-27 | End: 2024-01-10

## 2024-01-10 RX ORDER — HUMAN RHO(D) IMMUNE GLOBULIN 300 UG/1
1500 INJECTION, SOLUTION INTRAMUSCULAR
Qty: 1 | Refills: 0 | Status: COMPLETED | COMMUNITY
Start: 2023-09-18 | End: 2024-01-10

## 2024-01-10 RX ORDER — NIFEDIPINE 60 MG/1
60 TABLET, FILM COATED, EXTENDED RELEASE ORAL DAILY
Qty: 30 | Refills: 1 | Status: COMPLETED | COMMUNITY
Start: 2023-11-21 | End: 2024-01-10

## 2024-01-10 RX ORDER — BLOOD-GLUCOSE METER
W/DEVICE KIT MISCELLANEOUS
Qty: 1 | Refills: 0 | Status: COMPLETED | COMMUNITY
Start: 2023-05-03 | End: 2024-01-10

## 2024-01-10 RX ORDER — NIFEDIPINE 60 MG/1
60 TABLET, EXTENDED RELEASE ORAL DAILY
Qty: 90 | Refills: 2 | Status: ACTIVE | COMMUNITY
Start: 2024-01-10 | End: 1900-01-01

## 2024-01-10 RX ORDER — NIFEDIPINE 30 MG/1
30 TABLET, EXTENDED RELEASE ORAL DAILY
Qty: 90 | Refills: 3 | Status: ACTIVE | COMMUNITY
Start: 2023-12-15 | End: 1900-01-01

## 2024-01-10 RX ORDER — HUMAN RHO(D) IMMUNE GLOBULIN 300 UG/1
1500 INJECTION, SOLUTION INTRAMUSCULAR
Qty: 1 | Refills: 0 | Status: COMPLETED | COMMUNITY
Start: 2022-11-23 | End: 2024-01-10

## 2024-01-10 RX ORDER — ONDANSETRON 4 MG/1
4 TABLET ORAL
Qty: 60 | Refills: 2 | Status: COMPLETED | COMMUNITY
Start: 2020-12-15 | End: 2024-01-10

## 2024-01-10 RX ORDER — HYDROCORTISONE ACETATE 25 MG/1
25 SUPPOSITORY RECTAL
Qty: 20 | Refills: 2 | Status: COMPLETED | COMMUNITY
Start: 2023-09-18 | End: 2024-01-10

## 2024-01-10 RX ORDER — HUMAN RHO(D) IMMUNE GLOBULIN 300 UG/1
1500 INJECTION, SOLUTION INTRAMUSCULAR
Qty: 1 | Refills: 0 | Status: COMPLETED | COMMUNITY
Start: 2023-05-08 | End: 2024-01-10

## 2024-01-10 RX ORDER — BLOOD SUGAR DIAGNOSTIC
STRIP MISCELLANEOUS
Qty: 120 | Refills: 4 | Status: COMPLETED | COMMUNITY
Start: 2023-05-03 | End: 2024-01-10

## 2024-01-10 RX ORDER — ALCOHOL ANTISEPTIC PADS
PADS, MEDICATED (EA) TOPICAL
Qty: 120 | Refills: 3 | Status: COMPLETED | COMMUNITY
Start: 2023-05-03 | End: 2024-01-10

## 2024-01-10 RX ORDER — NORETHINDRONE 0.35 MG/1
0.35 TABLET ORAL DAILY
Qty: 3 | Refills: 3 | Status: ACTIVE | COMMUNITY
Start: 2024-01-10 | End: 1900-01-01

## 2024-01-10 RX ORDER — MEDROXYPROGESTERONE ACETATE 5 MG/1
5 TABLET ORAL DAILY
Qty: 10 | Refills: 0 | Status: COMPLETED | COMMUNITY
Start: 2023-02-27 | End: 2024-01-10

## 2024-01-10 RX ORDER — HUMAN RHO(D) IMMUNE GLOBULIN 50 UG/1
250 INJECTION, SOLUTION INTRAMUSCULAR
Qty: 1 | Refills: 0 | Status: COMPLETED | COMMUNITY
Start: 2021-04-13 | End: 2024-01-10

## 2024-01-10 NOTE — HISTORY OF PRESENT ILLNESS
[0/10] : no pain reported [Clean/Dry/Intact] : clean, dry and intact [Normal] : the vagina was normal [Doing Well] : is doing well [No Sign of Infection] : is showing no signs of infection [None] : None [Fever] : no fever [Vaginal Bleeding] : no vaginal bleeding [de-identified] : patient 8 weeks postpartum after  with IOL for IUGR with cHTN and superimposed severe pre-eclampsia. She was readmitted for a suspected spinal headache with MRI imaging showing cortical vein thrombosis with hemorrhage and admitted to stroke unit. She had no focal neurological deficits. She was discharged home 24 hours later on eliquis 5mg with f/u outpatient with neurology.   [de-identified] : discussed options for birth controls, reports her and partner are done with childbearing. Discussed options given cHTN and h/o cortical vein thrombosis, elected to try POP's (category 2/3 per [place] medical eligibility criteria for contraceptive use). Discussed options for tubal ligation, mirena IUD, vasectomy. Advised f/u with cardiology for cHTN and titration of antihypertensive medications.

## 2024-01-12 ENCOUNTER — APPOINTMENT (OUTPATIENT)
Dept: NEUROLOGY | Facility: CLINIC | Age: 29
End: 2024-01-12
Payer: COMMERCIAL

## 2024-01-12 VITALS
HEART RATE: 80 BPM | DIASTOLIC BLOOD PRESSURE: 93 MMHG | HEIGHT: 69 IN | SYSTOLIC BLOOD PRESSURE: 137 MMHG | BODY MASS INDEX: 28.44 KG/M2 | WEIGHT: 192 LBS | OXYGEN SATURATION: 100 %

## 2024-01-12 PROCEDURE — 99215 OFFICE O/P EST HI 40 MIN: CPT

## 2024-01-12 NOTE — PHYSICAL EXAM
[General Appearance - Alert] : alert [General Appearance - In No Acute Distress] : in no acute distress [General Appearance - Well Nourished] : well nourished [General Appearance - Well Developed] : well developed [Oriented To Time, Place, And Person] : oriented to person, place, and time [Affect] : the affect was normal [Person] : oriented to person [Place] : oriented to place [Time] : oriented to time [Fluency] : fluency intact [Comprehension] : comprehension intact [Cranial Nerves Optic (II)] : visual acuity intact bilaterally,  visual fields full to confrontation, pupils equal round and reactive to light [Cranial Nerves Oculomotor (III)] : extraocular motion intact [Cranial Nerves Trigeminal (V)] : facial sensation intact symmetrically [Cranial Nerves Facial (VII)] : face symmetrical [Cranial Nerves Vestibulocochlear (VIII)] : hearing was intact bilaterally [Cranial Nerves Glossopharyngeal (IX)] : tongue and palate midline [Cranial Nerves Accessory (XI - Cranial And Spinal)] : head turning and shoulder shrug symmetric [Cranial Nerves Hypoglossal (XII)] : there was no tongue deviation with protrusion [Motor Tone] : muscle tone was normal in all four extremities [Motor Strength] : muscle strength was normal in all four extremities [Paresis Pronator Drift Right-Sided] : no pronator drift on the right [Paresis Pronator Drift Left-Sided] : no pronator drift on the left [Abnormal Walk] : normal gait [Sensation Tactile Decrease] : light touch was intact [Coordination - Dysmetria Impaired Finger-to-Nose Bilateral] : not present [2+] : Patella left 2+

## 2024-01-12 NOTE — HISTORY OF PRESENT ILLNESS
[FreeTextEntry1] : Pt is a 29 yo F with PCOS,  21 with HTN complicated by severe eclampsia, who presents for CVT f/u. Pt developed severe headache post delivery/epidural, thought to be a spinal headache, however a right cortical venous thrombosis was noted on MRI. She has been on eliquis 5mg BID since then without significant bleeding/bruising. Denies prior h/o clots/stroke. Non smoker, denies tobacco product use or h/o migraine. Pt's father has migraines and had two strokes at a young age, at 38 yo and 55 yo. Pt endorses feeling well otherwise, no longer having headaches. Plan to start progesterone only birth control soon.

## 2024-01-12 NOTE — DISCUSSION/SUMMARY
[FreeTextEntry1] : Pt is a 27 yo F with PCOS,  21 with HTN complicated by severe eclampsia, who presents for CVT f/u.  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 Family h/o strokes at a young age. - Hematology referral - Continue eliquis 5mg BID - MRI brain with/without and MRV in 1 mo - Duration of AC at least 3-6 mo pending Hematology eval   RTC in 3 mo

## 2024-02-14 DIAGNOSIS — N89.8 OTHER SPECIFIED NONINFLAMMATORY DISORDERS OF VAGINA: ICD-10-CM

## 2024-02-14 RX ORDER — TERCONAZOLE 8 MG/G
0.8 CREAM VAGINAL
Qty: 1 | Refills: 1 | Status: ACTIVE | COMMUNITY
Start: 2024-02-14 | End: 1900-01-01

## 2024-02-20 ENCOUNTER — APPOINTMENT (OUTPATIENT)
Dept: OBGYN | Facility: CLINIC | Age: 29
End: 2024-02-20

## 2024-02-25 NOTE — PATIENT PROFILE ADULT - HAS THE PATIENT RECEIVED THE INFLUENZA VACCINE THIS SEASON?
expected vascular flow. Nephrology consulted. No PRN isradipine given recently.    Plan:  Only obtain blood pressures in upper extremities.   Nephrology recommendations:     - If patient's systolic BP > 109 mm Hg (99th + 12 mm Hg), can give PRN Isradipine 0.05 mg/kg/dose Q6H   yes...

## 2024-03-19 ENCOUNTER — OUTPATIENT (OUTPATIENT)
Dept: OUTPATIENT SERVICES | Facility: HOSPITAL | Age: 29
LOS: 1 days | End: 2024-03-19
Payer: COMMERCIAL

## 2024-03-19 ENCOUNTER — RESULT REVIEW (OUTPATIENT)
Age: 29
End: 2024-03-19

## 2024-03-19 DIAGNOSIS — K40.20 BILATERAL INGUINAL HERNIA, WITHOUT OBSTRUCTION OR GANGRENE, NOT SPECIFIED AS RECURRENT: Chronic | ICD-10-CM

## 2024-03-19 PROCEDURE — 70553 MRI BRAIN STEM W/O & W/DYE: CPT | Mod: 26

## 2024-03-19 PROCEDURE — 70544 MR ANGIOGRAPHY HEAD W/O DYE: CPT | Mod: 26,59

## 2024-03-19 PROCEDURE — 70544 MR ANGIOGRAPHY HEAD W/O DYE: CPT

## 2024-03-19 PROCEDURE — A9579: CPT

## 2024-03-19 PROCEDURE — 70553 MRI BRAIN STEM W/O & W/DYE: CPT

## 2024-04-02 ENCOUNTER — APPOINTMENT (OUTPATIENT)
Dept: OBGYN | Facility: CLINIC | Age: 29
End: 2024-04-02
Payer: COMMERCIAL

## 2024-04-02 VITALS
HEIGHT: 69 IN | WEIGHT: 200 LBS | BODY MASS INDEX: 29.62 KG/M2 | SYSTOLIC BLOOD PRESSURE: 140 MMHG | DIASTOLIC BLOOD PRESSURE: 80 MMHG

## 2024-04-02 DIAGNOSIS — Z01.419 ENCOUNTER FOR GYNECOLOGICAL EXAMINATION (GENERAL) (ROUTINE) W/OUT ABNORMAL FINDINGS: ICD-10-CM

## 2024-04-02 PROCEDURE — 99395 PREV VISIT EST AGE 18-39: CPT

## 2024-04-04 LAB — HPV HIGH+LOW RISK DNA PNL CVX: NOT DETECTED

## 2024-04-11 LAB — CYTOLOGY CVX/VAG DOC THIN PREP: NORMAL

## 2024-04-29 ENCOUNTER — RX RENEWAL (OUTPATIENT)
Age: 29
End: 2024-04-29

## 2024-04-29 RX ORDER — APIXABAN 5 MG/1
5 TABLET, FILM COATED ORAL
Qty: 60 | Refills: 3 | Status: ACTIVE | COMMUNITY
Start: 2023-12-15 | End: 1900-01-01

## 2024-04-30 NOTE — OB RN PATIENT PROFILE - PRO PRENATAL LABS ORI SOURCE HBSAG
Pt is a 1y1m male c/o fever/vomiting. Mom reports pt has had a fever for 5 days, lowest fever recorded was 100.8, and has vomited his bottle last night and this morning. Pt is producing 5-6 wet diapers a day, less than normal, and is up to date on immunizations. Pt had Motrin at 0800 and Tylenol at 1300. Pt is producing wet tears, demonstrating age appropriate behavior, no accessory muscle use noted when breathing.
SCM

## 2024-05-02 ENCOUNTER — APPOINTMENT (OUTPATIENT)
Dept: NEUROLOGY | Facility: CLINIC | Age: 29
End: 2024-05-02

## 2024-07-22 ENCOUNTER — APPOINTMENT (OUTPATIENT)
Dept: OBGYN | Facility: CLINIC | Age: 29
End: 2024-07-22
Payer: COMMERCIAL

## 2024-07-22 VITALS
BODY MASS INDEX: 28.73 KG/M2 | HEIGHT: 69 IN | DIASTOLIC BLOOD PRESSURE: 99 MMHG | SYSTOLIC BLOOD PRESSURE: 134 MMHG | WEIGHT: 194 LBS

## 2024-07-22 DIAGNOSIS — Z30.430 ENCOUNTER FOR INSERTION OF INTRAUTERINE CONTRACEPTIVE DEVICE: ICD-10-CM

## 2024-07-22 PROCEDURE — 58300 INSERT INTRAUTERINE DEVICE: CPT

## 2024-07-22 PROCEDURE — 76830 TRANSVAGINAL US NON-OB: CPT

## 2024-07-22 NOTE — PROCEDURE
[Locate IUD] : locate IUD [Transvaginal Ultrasound] : transvaginal ultrasound [Retroverted] : retroverted [No Fibroid(s)] : no fibroid(s) [L: ___ cm] : L: [unfilled] cm [W: ___cm] : W: [unfilled] cm [H: ___ cm] : H: [unfilled] cm [IUD Placement] : intrauterine device (IUD) placement [Prevention of Pregnancy] : prevention of pregnancy [Risks] : risks [Benefits] : benefits [Alternatives] : alternatives [Patient] : patient [Infection] : infection [Bleeding] : bleeding [Pain] : pain [Expulsion] : expulsion [Failure] : failure [Uterine Perforation] : uterine perforation [Neg Pregnancy Test] : negative pregnancy test [No Premedication] : No premedication [Tenaculum] : Tenaculum [Easy Passage] : Easy passage [Sounded to ___ cm] : sounded to [unfilled] ~Ucm [Post Placement Transvag. US] : post placement transvaginal ultrasound [Mirena IUD] : Mirena IUD [Tolerated Well] : Patient tolerated the procedure well [Motrin/Ibuprofen] : Motrin/Ibuprofen [FreeTextEntry5] : ES: 1.21 cm with IUD in EE [FreeTextEntry7] : 3.77 x 1.75 cm [FreeTextEntry8] : 3.55 x 1.70 cm [FreeTextEntry6] : cervix: 2.95 cm [FreeTextEntry4] : IUD in appropriate location [de-identified] : [id number] [de-identified] : 6/2026 [de-identified] : bleeding from ternaculum site, resolved with Monsels

## 2024-07-22 NOTE — HISTORY OF PRESENT ILLNESS
[FreeTextEntry1] : 30 yo P2 presents for IUD insertion today. Denies any complaints, LMP 7/19/2024. upreg in office negative today.

## 2024-07-24 LAB
BV BACTERIA RRNA VAG QL NAA+PROBE: NOT DETECTED
C GLABRATA RNA VAG QL NAA+PROBE: NOT DETECTED
C TRACH RRNA SPEC QL NAA+PROBE: NOT DETECTED
CANDIDA RRNA VAG QL PROBE: NOT DETECTED
N GONORRHOEA RRNA SPEC QL NAA+PROBE: NOT DETECTED
T VAGINALIS RRNA SPEC QL NAA+PROBE: NOT DETECTED

## 2024-08-28 ENCOUNTER — APPOINTMENT (OUTPATIENT)
Dept: OBGYN | Facility: CLINIC | Age: 29
End: 2024-08-28
Payer: COMMERCIAL

## 2024-08-28 VITALS
BODY MASS INDEX: 27.85 KG/M2 | DIASTOLIC BLOOD PRESSURE: 85 MMHG | WEIGHT: 188 LBS | HEIGHT: 69 IN | SYSTOLIC BLOOD PRESSURE: 135 MMHG

## 2024-08-28 DIAGNOSIS — Z97.5 PRESENCE OF (INTRAUTERINE) CONTRACEPTIVE DEVICE: ICD-10-CM

## 2024-08-28 PROCEDURE — 99459 PELVIC EXAMINATION: CPT

## 2024-08-28 PROCEDURE — 99213 OFFICE O/P EST LOW 20 MIN: CPT

## 2024-08-28 NOTE — PHYSICAL EXAM
[Chaperone Present] : A chaperone was present in the examining room during all aspects of the physical examination [[id number]] : A chaperone was present during the pelvic exam. [FreeTextEntry2] : Dee [Labia Majora] : normal [Labia Minora] : normal [Normal] : normal [IUD String] : an IUD string was noted

## 2024-10-26 NOTE — OB PROVIDER H&P - [id number]
4 Eyes Skin Assessment     NAME:  Reji Spann  YOB: 1968  MEDICAL RECORD NUMBER:  [id number]    The patient is being assessed for  Admission    I agree that at least one RN has performed a thorough Head to Toe Skin Assessment on the patient. ALL assessment sites listed below have been assessed.      Areas assessed by both nurses:  Ashley Morales RN / Maribel Uriostegui RN   Head, Face, Ears, Shoulders, Back, Chest, Arms, Elbows, Hands, Sacrum. Buttock, Coccyx, Ischium, Legs. Feet and Heels, and Under Medical Devices         Does the Patient have a Wound? No noted wound(s)       Daniel Prevention initiated by RN: Yes  Wound Care Orders initiated by RN: No    Pressure Injury (Stage 3,4, Unstageable, DTI, NWPT, and Complex wounds) if present, place Wound referral order by RN under : No    New Ostomies, if present place, Ostomy referral order under : No     Nurse 1 eSignature: Electronically signed by Makenna Morales RN on 10/26/24 at 5:40 AM EDT    **SHARE this note so that the co-signing nurse can place an eSignature**    Nurse 2 eSignature: Electronically signed by Maribel Schumacher RN on 10/26/24 at 7:10 AM EDT    1

## 2025-07-10 NOTE — PATIENT PROFILE ADULT - SAFE PLACE TO LIVE
Already communicated to patient that he needs follow up for refill. Will discuss at upcoming appt  
no